# Patient Record
Sex: MALE | Race: WHITE | Employment: UNEMPLOYED | URBAN - NONMETROPOLITAN AREA
[De-identification: names, ages, dates, MRNs, and addresses within clinical notes are randomized per-mention and may not be internally consistent; named-entity substitution may affect disease eponyms.]

---

## 2020-11-30 ENCOUNTER — APPOINTMENT (OUTPATIENT)
Dept: CT IMAGING | Age: 70
DRG: 372 | End: 2020-11-30

## 2020-11-30 ENCOUNTER — HOSPITAL ENCOUNTER (INPATIENT)
Age: 70
LOS: 5 days | Discharge: HOME | DRG: 372 | End: 2020-12-05
Attending: EMERGENCY MEDICINE | Admitting: INTERNAL MEDICINE
Payer: COMMERCIAL

## 2020-11-30 PROBLEM — K65.1 INTRA-ABDOMINAL ABSCESS (HCC): Status: ACTIVE | Noted: 2020-11-30

## 2020-11-30 LAB
ALBUMIN SERPL-MCNC: 3.4 G/DL (ref 3.5–5.1)
ALP BLD-CCNC: 65 U/L (ref 38–126)
ALT SERPL-CCNC: 6 U/L (ref 11–66)
ANION GAP SERPL CALCULATED.3IONS-SCNC: 11 MEQ/L (ref 8–16)
AST SERPL-CCNC: 12 U/L (ref 5–40)
BACTERIA: ABNORMAL /HPF
BASOPHILS # BLD: 0.4 %
BASOPHILS ABSOLUTE: 0 THOU/MM3 (ref 0–0.1)
BILIRUB SERPL-MCNC: 0.4 MG/DL (ref 0.3–1.2)
BILIRUBIN URINE: NEGATIVE
BLOOD, URINE: NEGATIVE
BUN BLDV-MCNC: 10 MG/DL (ref 7–22)
CALCIUM SERPL-MCNC: 9.5 MG/DL (ref 8.5–10.5)
CASTS UA: ABNORMAL /LPF
CEA: 1 NG/ML (ref 0–5)
CHARACTER, URINE: CLEAR
CHLORIDE BLD-SCNC: 98 MEQ/L (ref 98–111)
CO2: 28 MEQ/L (ref 23–33)
COLOR: ABNORMAL
CREAT SERPL-MCNC: 0.8 MG/DL (ref 0.4–1.2)
CRYSTALS, UA: ABNORMAL
EOSINOPHIL # BLD: 0.1 %
EOSINOPHILS ABSOLUTE: 0 THOU/MM3 (ref 0–0.4)
EPITHELIAL CELLS, UA: ABNORMAL /HPF
ERYTHROCYTE [DISTWIDTH] IN BLOOD BY AUTOMATED COUNT: 15.7 % (ref 11.5–14.5)
ERYTHROCYTE [DISTWIDTH] IN BLOOD BY AUTOMATED COUNT: 49.1 FL (ref 35–45)
GFR SERPL CREATININE-BSD FRML MDRD: > 90 ML/MIN/1.73M2
GLUCOSE BLD-MCNC: 107 MG/DL (ref 70–108)
GLUCOSE URINE: NEGATIVE MG/DL
HCT VFR BLD CALC: 36 % (ref 42–52)
HEMOGLOBIN: 11.2 GM/DL (ref 14–18)
IMMATURE GRANS (ABS): 0.05 THOU/MM3 (ref 0–0.07)
IMMATURE GRANULOCYTES: 0.5 %
KETONES, URINE: ABNORMAL
LACTIC ACID: 1.3 MMOL/L (ref 0.5–2.2)
LEUKOCYTE ESTERASE, URINE: NEGATIVE
LIPASE: 39.4 U/L (ref 5.6–51.3)
LYMPHOCYTES # BLD: 9.3 %
LYMPHOCYTES ABSOLUTE: 1 THOU/MM3 (ref 1–4.8)
MCH RBC QN AUTO: 26.9 PG (ref 26–33)
MCHC RBC AUTO-ENTMCNC: 31.1 GM/DL (ref 32.2–35.5)
MCV RBC AUTO: 86.5 FL (ref 80–94)
MISCELLANEOUS 2: ABNORMAL
MONOCYTES # BLD: 8 %
MONOCYTES ABSOLUTE: 0.9 THOU/MM3 (ref 0.4–1.3)
MUCUS: ABNORMAL
NITRITE, URINE: NEGATIVE
NUCLEATED RED BLOOD CELLS: 0 /100 WBC
OSMOLALITY CALCULATION: 273.3 MOSMOL/KG (ref 275–300)
PH UA: 5.5 (ref 5–9)
PLATELET # BLD: 300 THOU/MM3 (ref 130–400)
PMV BLD AUTO: 8.6 FL (ref 9.4–12.4)
POTASSIUM REFLEX MAGNESIUM: 4.1 MEQ/L (ref 3.5–5.2)
PROTEIN UA: ABNORMAL
RBC # BLD: 4.16 MILL/MM3 (ref 4.7–6.1)
RBC URINE: ABNORMAL /HPF
RENAL EPITHELIAL, UA: ABNORMAL
SEG NEUTROPHILS: 81.7 %
SEGMENTED NEUTROPHILS ABSOLUTE COUNT: 8.9 THOU/MM3 (ref 1.8–7.7)
SODIUM BLD-SCNC: 137 MEQ/L (ref 135–145)
SPECIFIC GRAVITY, URINE: 1.02 (ref 1–1.03)
TOTAL PROTEIN: 7.2 G/DL (ref 6.1–8)
UROBILINOGEN, URINE: 0.2 EU/DL (ref 0–1)
WBC # BLD: 10.9 THOU/MM3 (ref 4.8–10.8)
WBC UA: ABNORMAL /HPF
YEAST: ABNORMAL

## 2020-11-30 PROCEDURE — 83690 ASSAY OF LIPASE: CPT

## 2020-11-30 PROCEDURE — 80053 COMPREHEN METABOLIC PANEL: CPT

## 2020-11-30 PROCEDURE — 2580000003 HC RX 258: Performed by: INTERNAL MEDICINE

## 2020-11-30 PROCEDURE — 2580000003 HC RX 258: Performed by: EMERGENCY MEDICINE

## 2020-11-30 PROCEDURE — 6370000000 HC RX 637 (ALT 250 FOR IP): Performed by: EMERGENCY MEDICINE

## 2020-11-30 PROCEDURE — 99283 EMERGENCY DEPT VISIT LOW MDM: CPT

## 2020-11-30 PROCEDURE — 1200000000 HC SEMI PRIVATE

## 2020-11-30 PROCEDURE — 36415 COLL VENOUS BLD VENIPUNCTURE: CPT

## 2020-11-30 PROCEDURE — 81001 URINALYSIS AUTO W/SCOPE: CPT

## 2020-11-30 PROCEDURE — APPSS60 APP SPLIT SHARED TIME 46-60 MINUTES: Performed by: PHYSICIAN ASSISTANT

## 2020-11-30 PROCEDURE — 87040 BLOOD CULTURE FOR BACTERIA: CPT

## 2020-11-30 PROCEDURE — 6370000000 HC RX 637 (ALT 250 FOR IP): Performed by: INTERNAL MEDICINE

## 2020-11-30 PROCEDURE — 6360000002 HC RX W HCPCS: Performed by: INTERNAL MEDICINE

## 2020-11-30 PROCEDURE — 99223 1ST HOSP IP/OBS HIGH 75: CPT | Performed by: SURGERY

## 2020-11-30 PROCEDURE — 83605 ASSAY OF LACTIC ACID: CPT

## 2020-11-30 PROCEDURE — 74176 CT ABD & PELVIS W/O CONTRAST: CPT

## 2020-11-30 PROCEDURE — 85025 COMPLETE CBC W/AUTO DIFF WBC: CPT

## 2020-11-30 PROCEDURE — 82378 CARCINOEMBRYONIC ANTIGEN: CPT

## 2020-11-30 RX ORDER — PROMETHAZINE HYDROCHLORIDE 25 MG/1
12.5 TABLET ORAL EVERY 6 HOURS PRN
Status: DISCONTINUED | OUTPATIENT
Start: 2020-11-30 | End: 2020-12-05 | Stop reason: HOSPADM

## 2020-11-30 RX ORDER — HYDROCODONE BITARTRATE AND ACETAMINOPHEN 5; 325 MG/1; MG/1
1 TABLET ORAL ONCE
Status: COMPLETED | OUTPATIENT
Start: 2020-11-30 | End: 2020-11-30

## 2020-11-30 RX ORDER — ACETAMINOPHEN 650 MG/1
650 SUPPOSITORY RECTAL EVERY 6 HOURS PRN
Status: DISCONTINUED | OUTPATIENT
Start: 2020-11-30 | End: 2020-12-05 | Stop reason: HOSPADM

## 2020-11-30 RX ORDER — SODIUM CHLORIDE 0.9 % (FLUSH) 0.9 %
10 SYRINGE (ML) INJECTION PRN
Status: DISCONTINUED | OUTPATIENT
Start: 2020-11-30 | End: 2020-12-05 | Stop reason: HOSPADM

## 2020-11-30 RX ORDER — HYDROCODONE BITARTRATE AND ACETAMINOPHEN 5; 325 MG/1; MG/1
1 TABLET ORAL EVERY 4 HOURS PRN
Status: DISCONTINUED | OUTPATIENT
Start: 2020-11-30 | End: 2020-12-05 | Stop reason: HOSPADM

## 2020-11-30 RX ORDER — SODIUM CHLORIDE 0.9 % (FLUSH) 0.9 %
10 SYRINGE (ML) INJECTION EVERY 12 HOURS SCHEDULED
Status: DISCONTINUED | OUTPATIENT
Start: 2020-11-30 | End: 2020-12-05 | Stop reason: HOSPADM

## 2020-11-30 RX ORDER — ONDANSETRON 2 MG/ML
4 INJECTION INTRAMUSCULAR; INTRAVENOUS EVERY 6 HOURS PRN
Status: DISCONTINUED | OUTPATIENT
Start: 2020-11-30 | End: 2020-12-05 | Stop reason: HOSPADM

## 2020-11-30 RX ORDER — POLYETHYLENE GLYCOL 3350 17 G/17G
17 POWDER, FOR SOLUTION ORAL DAILY PRN
Status: DISCONTINUED | OUTPATIENT
Start: 2020-11-30 | End: 2020-12-05 | Stop reason: HOSPADM

## 2020-11-30 RX ORDER — SODIUM CHLORIDE 9 MG/ML
INJECTION, SOLUTION INTRAVENOUS CONTINUOUS
Status: DISCONTINUED | OUTPATIENT
Start: 2020-11-30 | End: 2020-12-03

## 2020-11-30 RX ORDER — ACETAMINOPHEN 325 MG/1
650 TABLET ORAL EVERY 6 HOURS PRN
Status: DISCONTINUED | OUTPATIENT
Start: 2020-11-30 | End: 2020-12-05 | Stop reason: HOSPADM

## 2020-11-30 RX ORDER — 0.9 % SODIUM CHLORIDE 0.9 %
1000 INTRAVENOUS SOLUTION INTRAVENOUS ONCE
Status: COMPLETED | OUTPATIENT
Start: 2020-11-30 | End: 2020-11-30

## 2020-11-30 RX ADMIN — HYDROCODONE BITARTRATE AND ACETAMINOPHEN 1 TABLET: 5; 325 TABLET ORAL at 13:46

## 2020-11-30 RX ADMIN — SODIUM CHLORIDE: 9 INJECTION, SOLUTION INTRAVENOUS at 15:56

## 2020-11-30 RX ADMIN — SODIUM CHLORIDE 1000 ML: 9 INJECTION, SOLUTION INTRAVENOUS at 10:43

## 2020-11-30 RX ADMIN — ACETAMINOPHEN 650 MG: 325 TABLET ORAL at 23:48

## 2020-11-30 RX ADMIN — PIPERACILLIN AND TAZOBACTAM 3.38 G: 3; .375 INJECTION, POWDER, LYOPHILIZED, FOR SOLUTION INTRAVENOUS at 18:48

## 2020-11-30 ASSESSMENT — PAIN SCALES - GENERAL
PAINLEVEL_OUTOF10: 3
PAINLEVEL_OUTOF10: 10

## 2020-11-30 NOTE — CONSULTS
[address] Surgery Consultation - Lucinda Lucio  On behalf of Dr. Douglas Bearden    Pt Name: Dipak Hu  MRN: [id number]  YOB: 1950  Date of evaluation: 11/30/2020  Primary Care Physician: No primary care provider on file. Patient evaluated at the request of  Dr. Jaron Redman  Reason for evaluation: Suspected retroperitoneal abscess. IMPRESSIONS:     Active Hospital Problems    Diagnosis Date Noted    Weight loss, unintentional [R63.4] 12/01/2020    Lower abdominal pain [R10.30] 12/01/2020    Intra-abdominal abscess (HCC) [K65.1] 11/30/2020       1. 6.5 x 6.7 cm abnormal density behind the right lobe of the liver with areas of fluid and air consistent with abscess noted on CT  2. Lower abdominal pain  3. Recent unintentional weight loss  4. Intermittent anorexia  5. To kidney stones noted in the right kidney on CT  6. Leukocytosis  7. Anemia   1.  has a past medical history of Cerebral artery occlusion with cerebral infarction ([facility] [address] and Hypertension. RECOMMENDATIONS:   1. General surgery agrees with current treatment course  2. Proceed with IR drainage of peritoneal abscess with fluid analysis and IV antibiotics  3. Obtain previous surgical/medical records specifically he states he had a colonoscopy 10 years ago from [facility] in [facility] patient has undergone previous surgeries/treatments  4. Etiology of this abscess is unclear at this time could be perforated right-sided diverticulitis, or neoplastic perforation of a ascending colon lesion or some other etiology. His CEA is normal at 1  SUBJECTIVE:   History of Chief Complaint:    Lian Pham is a 65 y.o.male who presents with 2 weeks of progressively worsening lower abdominal pain. Patient states 7 to 8 months ago he had shingles over left flank, and he believes this transitioned to shingles inside his abdomen.   However the pain started 2 weeks ago and got progressively worse until he felt the pain was unbearable and he came into the emergency room for evaluation. Patient has apparently been losing weight unintentionally over the last several years, notably in the last several months. Patient also has had intermittent anorexia for several months. Patient states he has history of left hip replacement, and hardware placed in right femoral head from a fracture. Patient also has history of prior surgery at [facility] in [facility] performed by Dr. Jazlyn Eason who in 2016 performed a cholecystectomy possibly associated with gallstone pancreatitis, shortly followed by an appendectomy and umbilical hernia repair. Patient denies any history of prior bowel disease, denies diverticulosis, inflammatory bowel disease. Patient may not questionable historian? Patient also states he had a colonoscopy greater than 10 years ago showed some kind of sore in his colon which was then removed. Patient states this was not a polyp, and did not have any portion of his colon removed. Patient did state that every time a surgeon has operated on his abdomen they have remarked that it was difficult, and the operative tissue was repeatedly described in the patient's account as \"firm\". Patient states he does occasionally get dizzy, but believes this is secondary to his lack of appetite. Patient denies any nausea, vomiting, headache, constipation, diarrhea, dysuria, frequency, urgency, hematochezia, melena, hematuria. Past Medical History   has a past medical history of Cerebral artery occlusion with cerebral infarction ([address] and Hypertension. Past Surgical History   has a past surgical history that includes joint replacement; fracture surgery; hernia repair; Cholecystectomy; Appendectomy; and Colonoscopy.   Medications  Prior to Admission medications    Not on File    Scheduled Meds:   sodium chloride flush  10 mL Intravenous 2 times per day    piperacillin-tazobactam  3.375 g Intravenous Q8H     Continuous Infusions:   sodium chloride 50 mL/hr at 20 1556     PRN Meds:.sodium chloride flush, acetaminophen **OR** acetaminophen, polyethylene glycol, promethazine **OR** ondansetron, HYDROcodone-acetaminophen  Allergies  has No Known Allergies. Family History  family history is not on file. Social History   reports that he has never smoked. He has never used smokeless tobacco.  Review of Systems  General positive dizzy. Denies any fever or chills  HEENT Denies any diplopia, tinnitus or vertigo  Resp Denies any shortness of breath, cough or wheezing  Cardiac Denies any chest pain, palpitations, claudication or edema  GI Denies any melena, hematochezia, hematemesis or pyrosis   Denies any frequency, urgency, hesitancy or incontinence  Neuro Denies any focal motor or sensory deficits  SUBJECTIVE:   CURRENT VITALS:  height is 5' 8\" (1.727 m) and weight is 135 lb 9.6 oz (61.5 kg). His oral temperature is 98.4 °F (36.9 °C). His blood pressure is 130/81 and his pulse is 76. His respiration is 16 and oxygen saturation is 97%. Body mass index is 20.62 kg/m². Temperature Range (24h):Temp: 98.4 °F (36.9 °C) Temp  Av.6 °F (37 °C)  Min: 98.4 °F (36.9 °C)  Max: 98.8 °F (37.1 °C)  BP Range (84A): Systolic (74BQL), MG , Min:124 , IPU:953     Diastolic (96RUX), IXO:04, Min:75, Max:89    Pulse Range (24h): Pulse  Av  Min: 69  Max: 94  Respiration Range (24h): Resp  Av.8  Min: 16  Max: 18  Current Pulse Ox (24h):  SpO2: 97 %  Pulse Ox Range (24h):  SpO2  Av %  Min: 97 %  Max: 99 %  Oxygen Amount and Delivery:    CONSTITUTIONAL: Alert and oriented times 3, no acute distress and cooperative to examination with proper mood and affect. SKIN: Skin color, texture, turgor normal. No rashes or lesions. HEENT: Head is normocephalic, atraumatic. EOMI, PERRLA. CHEST/LUNGS: chest symmetric with normal A/P diameter, normal respiratory rate and rhythm, lungs clear to auscultation without wheezes, rales or rhonchi. No accessory muscle use.  Scars None   CARDIOVASCULAR: Heart sounds are normal.  Regular rate and rhythm without murmur, gallop or rub. Normal S1 and S2. Carotid and femoral pulses 2+/4 and equal bilaterally. ABDOMEN: More protuberant over right upper and lower quadrants. Normoactive bowel sounds. No bruits. Mildly firm to palpation over right upper quadrant with some tenderness with deep palpation. No notable evidence of hernias. NEUROLOGIC: There are no focalizing motor or sensory deficits. CN II-XII are grossly intact. Jaymie Dino EXTREMITIES: no cyanosis, no clubbing and no edema. LABS:     Recent Labs     11/30/20  1033 11/30/20  1140   WBC 10.9*  --    HGB 11.2*  --    HCT 36.0*  --      --      --    K 4.1  --    CL 98  --    CO2 28  --    BUN 10  --    CREATININE 0.8  --    CALCIUM 9.5  --    AST 12  --    ALT 6*  --    BILITOT 0.4  --    LIPASE 39.4  --    NITRU  --  NEGATIVE   COLORU  --  DK YELLOW*   BACTERIA  --  NONE SEEN     RADIOLOGY:     CT ABDOMEN PELVIS WO CONTRAST Additional Contrast? None   Final Result       1. 6.5 x 6.7 cm abnormal density behind the right lobe of the liver with areas of fluid and air consistent with inflammatory processes. 2. Malrotated right kidney with small stones. No hydronephrosis. 3. Small right pleural effusion and atelectasis or infiltrate at the right lung base. 4. Status post cholecystectomy. 5. Mild enlargement of the prostate gland. 6. Compression screw in the right femoral head and intramedullary ruth ann in the right femoral shaft. Left hip replacement in place. 7. Atherosclerotic calcification in the abdominal aorta and iliac arteries. 8. Lumbar spondylosis. 9. Otherwise negative CT scan of the abdomen and pelvis. .               **This report has been created using voice recognition software. It may contain minor errors which are inherent in voice recognition technology. **      Final report electronically signed by DR Dean Webb on 11/30/2020 11:28 AM      IR Interventional Radiology Procedure Request    (Results Pending)       Thank you for the interesting evaluation. Further recommendations to follow.     Electronically signed by MOSHE Paez on 11/30/2020 at 6:22 PM

## 2020-11-30 NOTE — ED NOTES
Patient is resting in bed with easy and unlabored respirations. Call light in reach. Side rails up x2. Wife at bedside. Patient denies further complaints or concerns. Will monitor.         Jono José RN  11/30/20 3125

## 2020-11-30 NOTE — ED PROVIDER NOTES
[address] [facility]       Chief Complaint   Patient presents with    Herpes Zoster    Fatigue       Nurses Notes reviewed and I agree except as noted in the HPI. HISTORY OF PRESENT ILLNESS    Sánchez Friedman is a 65 y.o. male who presents with complaint of history of herpes zoster. States that he has herpes inside his abdomen currently. Said that he has burning sensation to lower abdomen, left worse than right. Patient also reporting fatigue and poor oral intake. Has had no fever chills, no chest pain, no coughs, no known sick contact. No recent antibiotic use. Onset: Subacute  Duration: 1 month  Timing: Constant  Location of Pain: Lower abdomen  Intesity/severity: Moderate discomfort  Modifying Factors: none  Relieved by;  Previous Episodes; Tx Before arrival: none  REVIEW OF SYSTEMS      Review of Systems   Constitutional: Negative for fever, chills, diaphoresis and fatigue. HENT: Negative for congestion, drooling, facial swelling and sore throat. Eyes: Negative for photophobia, pain and discharge. Respiratory: Negative for cough, shortness of breath, wheezing and stridor. Cardiovascular: Negative for chest pain, palpitations and leg swelling. Gastrointestinal: Pos for lower abdominal pain; Neg for blood in stool and abdominal distention. Endocrine: Negative for cold intolerance, heat intolerance, polydipsia and polyuria. Genitourinary: Negative for dysuria, urgency, hematuria and difficulty urinating. Musculoskeletal: Negative for gait problem, neck pain and neck stiffness. Skin; No rash, No itching  Neurological: Negative for seizures, weakness and numbness. PAST MEDICAL HISTORY    has a past medical history of Cerebral artery occlusion with cerebral infarction ([address] and Hypertension. SURGICAL HISTORY      has a past surgical history that includes joint replacement; fracture surgery; hernia repair; Cholecystectomy; Appendectomy; and Colonoscopy.     CURRENT MRI are read by the radiologist.  Plain radiographic images are visualized and preliminarily interpreted by the emergency physician unless otherwise stated below.       LABS:   Labs Reviewed   CBC WITH AUTO DIFFERENTIAL - Abnormal; Notable for the following components:       Result Value    WBC 10.9 (*)     RBC 4.16 (*)     Hemoglobin 11.2 (*)     Hematocrit 36.0 (*)     MCHC 31.1 (*)     RDW-CV 15.7 (*)     RDW-SD 49.1 (*)     MPV 8.6 (*)     Segs Absolute 8.9 (*)     All other components within normal limits   COMPREHENSIVE METABOLIC PANEL W/ REFLEX TO MG FOR LOW K - Abnormal; Notable for the following components:    Alb 3.4 (*)     ALT 6 (*)     All other components within normal limits   OSMOLALITY - Abnormal; Notable for the following components:    Osmolality Calc 273.3 (*)     All other components within normal limits   URINE WITH REFLEXED MICRO - Abnormal; Notable for the following components:    Ketones, Urine TRACE (*)     Protein, UA TRACE (*)     Color, UA DK YELLOW (*)     All other components within normal limits   COMPREHENSIVE METABOLIC PANEL W/ REFLEX TO MG FOR LOW K - Abnormal; Notable for the following components:    Alb 2.7 (*)     ALT 6 (*)     All other components within normal limits   CBC WITH AUTO DIFFERENTIAL - Abnormal; Notable for the following components:    RBC 3.85 (*)     Hemoglobin 10.3 (*)     Hematocrit 34.0 (*)     MCHC 30.3 (*)     RDW-CV 15.6 (*)     RDW-SD 50.4 (*)     MPV 9.1 (*)     All other components within normal limits   CULTURE, BLOOD 1    Narrative:     Source: blood-Adult-suboptimal <5.5oz./set volume       Site: Peripheral Vein(single bottle)            Current Antibiotics: not stated   CULTURE, BLOOD 2    Narrative:     Source: blood-Adult-suboptimal <5.5oz./set volume       Site: Peripheral Vein(single bottle)            Current Antibiotics: not stated   CULTURE, ANAEROBIC AND AEROBIC    Narrative:     Source: abdomen       Site: abscess fluid          Current Antibiotics:   Piperacillin/Tazobactam   CULTURE, ANAEROBIC AND AEROBIC   CULTURE, FUNGUS   CULTURE, AEROBIC   LIPASE   ANION GAP   GLOMERULAR FILTRATION RATE, ESTIMATED   LACTIC ACID, PLASMA   LACTIC ACID, PLASMA   CEA   LACTIC ACID, PLASMA   ANION GAP   GLOMERULAR FILTRATION RATE, ESTIMATED   LACTIC ACID, PLASMA   MISCELLANEOUS LAB TEST #1    Narrative:     Zach Garcia [id number] - [id number]   LACTIC ACID, PLASMA   LACTIC ACID, PLASMA       EMERGENCY DEPARTMENT COURSE:   Vitals:    Vitals:    12/01/20 1605 12/01/20 2042 12/02/20 0045 12/02/20 0415   BP: 124/76 104/73 104/69 105/76   Pulse: 72 64 77 71   Resp: 18 16 14 16   Temp: 98.2 °F (36.8 °C) 98.6 °F (37 °C) 98.8 °F (37.1 °C) 98.4 °F (36.9 °C)   TempSrc: Oral Oral Oral Oral   SpO2: 99% 96% 97% 97%   Weight:       Height:       Patient presenting with vague abdominal complaints, thinks that he has herpes zoster infection inside his abdomen. Patient's abdomen is nonacute. CAT scan of the patient's abdomen shows fluid collection suspicious for infectious process on the right liver. Patient is nontoxic. Case discussed with the hospitalist, will admit patient to the hospital with IR consult. CRITICAL CARE:     CONSULTS:  None    PROCEDURES:  None    FINAL IMPRESSION      1. Intra-abdominal abscess ([address]          DISPOSITION/PLAN   Admitted    PATIENT REFERRED TO:  No follow-up provider specified. DISCHARGE MEDICATIONS:  There are no discharge medications for this patient.       (Please note that portions of this note were completed with a voice recognition program.  Efforts were made to edit the dictations but occasionally words are mis-transcribed.)    Joselin Brower, [address] [place] [address],   12/02/20 0800

## 2020-11-30 NOTE — H&P
INTERNAL MEDICINE SPECIALTIES    History & Physical    Patient:  Tika De Paz  YOB: 1950  Date of Service: 11/30/2020  MRN: [id number]   Acct:  [de-identified]   Primary Care Physician: No primary care provider on file. Chief Complaint: Abdominal pain    History of Present Illness:   History obtained from the patient. The patient is a 65 y.o. male who has a background history of peptic ulcer disease, hypertension, CVA had developed pain across his mid abdomen over period of 2 weeks. He describes this as burning without relief from Tylenol. Symptoms have been nonradiating and there has been no abdominal distension. He denies having any nausea vomiting diarrhea or constipation no hematemesis hematochezia or melanotic stools. He has had no fever or chills. He did have a CT scan of the abdomen performed in the emergency room which had shown  A 6.5 x 6.7 cm abdominal density behind the right lobe of the liver with areas of fluid and air consistent with an inflammatory process, malrotated right kidney with small stones, right pleural effusion which is small and atelectasis/ infiltrate of the right lung base, prior cholecystectomy and mild enlargement of the prostate gland and a compression on the right femoral head and intramedullary ruth ann on the right femoral shaft. Patient was subsequently admitted. Past Medical History:        Diagnosis Date    Cerebral artery occlusion with cerebral infarction ([address]     Hypertension        Past Surgical History:        Procedure Laterality Date    APPENDECTOMY      CHOLECYSTECTOMY      COLONOSCOPY      FRACTURE SURGERY      HERNIA REPAIR      JOINT REPLACEMENT         Home Medications:   No current facility-administered medications on file prior to encounter. No current outpatient medications on file prior to encounter. Allergies:  Patient has no known allergies. Social History:    reports that he has never smoked.  He has never used smokeless tobacco.    Family History:   No family history on file. Review of systems:    CNS: No seizures, no dizziness, no facial droop,  no paresthesia, numbness or muscle Weakness, no dysphasia or  Dysphagia,  CARDIOVASCULAR: No chest pain, dyspnea at rest or with activity, no orthopnea or PND, no palpitations, no ankle edema  RESPIRATORY: No cough wheezing , rhinorrhea, nasal congestion or sore throat  GASTROINTESTINAL SYSTEM: As per HPI or otherwise negative  GENITOURINARY SYSTEM: No dysuria , hematuria, urinary frequency , incontinence,  flank pains , urgency , genital discharge  SKIN / [place]:  No rashes, petechiae, , no open wounds , no soft tissue swelling   MUSCULOSKELETAL: No bone pains, no arthralgia, no joint swelling, no myalgia  HEMATOLOGIC: No easy bruising, no bleeding  OPHTHALMOLOGY: No conjuctival injection, no discharge, no pain, no blurring of vision, no loss of vision, no diplopia  EAR: No loss of hearing , tinnitus, ear discharge , no ear pain          Vitals:   Vitals:    11/30/20 1524   BP: 130/81   Pulse: 76   Resp: 16   Temp: 98.4 °F (36.9 °C)   SpO2: 97%      BMI: Body mass index is 20.62 kg/m². PHYSICAL EXAMINATION:            General appearance:  No apparent distress, appears stated age and cooperative. HEENT:  Normal cephalic, atraumatic without obvious deformity. Pupils equal, round, and reactive to light. Extra ocular muscles intact. Conjunctivae/corneas clear. Neck: Supple   Respiratory:  Normal respiratory effort. Clear to auscultation, bilaterally without Rales/Wheezes/Rhonchi. Cardiovascular:  Regular rhythm with normal S1/S2 without murmurs, rubs or gallops. Abdomen: Full soft,  Tender++ mid abdomen and RUQ,  normal bowel sounds. Musculoskeletal:  No clubbing, cyanosis or edema bilaterally. Full range of motion without deformity. Skin: Skin color, texture, turgor normal.  No rashes or lesions.   Neurologic: Alert and oriented X 3, Neurovascularly intact without any focal motor deficits.    Psychiatric:   Thought content appropriate, normal insight    Review of Labs and Diagnostic Testing:    Recent Results (from the past 24 hour(s))   CBC Auto Differential    Collection Time: 11/30/20 10:33 AM   Result Value Ref Range    WBC 10.9 (H) 4.8 - 10.8 thou/mm3    RBC 4.16 (L) 4.70 - 6.10 mill/mm3    Hemoglobin 11.2 (L) 14.0 - 18.0 gm/dl    Hematocrit 36.0 (L) 42.0 - 52.0 %    MCV 86.5 80.0 - 94.0 fL    MCH 26.9 26.0 - 33.0 pg    MCHC 31.1 (L) 32.2 - 35.5 gm/dl    RDW-CV 15.7 (H) 11.5 - 14.5 %    RDW-SD 49.1 (H) 35.0 - 45.0 fL    Platelets 496 320 - 926 thou/mm3    MPV 8.6 (L) 9.4 - 12.4 fL    Seg Neutrophils 81.7 %    Lymphocytes 9.3 %    Monocytes 8.0 %    Eosinophils 0.1 %    Basophils 0.4 %    Immature Granulocytes 0.5 %    Segs Absolute 8.9 (H) 1.8 - 7.7 thou/mm3    Lymphocytes Absolute 1.0 1.0 - 4.8 thou/mm3    Monocytes Absolute 0.9 0.4 - 1.3 thou/mm3    Eosinophils Absolute 0.0 0.0 - 0.4 thou/mm3    Basophils Absolute 0.0 0.0 - 0.1 thou/mm3    Immature Grans (Abs) 0.05 0.00 - 0.07 thou/mm3    nRBC 0 /100 wbc   Comprehensive Metabolic Panel w/ Reflex to MG    Collection Time: 11/30/20 10:33 AM   Result Value Ref Range    Glucose 107 70 - 108 mg/dL    CREATININE 0.8 0.4 - 1.2 mg/dL    BUN 10 7 - 22 mg/dL    Sodium 137 135 - 145 meq/L    Potassium reflex Magnesium 4.1 3.5 - 5.2 meq/L    Chloride 98 98 - 111 meq/L    CO2 28 23 - 33 meq/L    Calcium 9.5 8.5 - 10.5 mg/dL    AST 12 5 - 40 U/L    Alkaline Phosphatase 65 38 - 126 U/L    Total Protein 7.2 6.1 - 8.0 g/dL    Alb 3.4 (L) 3.5 - 5.1 g/dL    Total Bilirubin 0.4 0.3 - 1.2 mg/dL    ALT 6 (L) 11 - 66 U/L   Lipase    Collection Time: 11/30/20 10:33 AM   Result Value Ref Range    Lipase 39.4 5.6 - 51.3 U/L   Anion Gap    Collection Time: 11/30/20 10:33 AM   Result Value Ref Range    Anion Gap 11.0 8.0 - 16.0 meq/L   Glomerular Filtration Rate, Estimated    Collection Time: 11/30/20 10:33 AM   Result Value Ref Range    Est, Glom Filt Rate >90 ml/min/1.73m2   Osmolality    Collection Time: 11/30/20 10:33 AM   Result Value Ref Range    Osmolality Calc 273.3 (L) 275.0 - 300.0 mOsmol/kg   Urine with Reflexed Micro    Collection Time: 11/30/20 11:40 AM   Result Value Ref Range    Glucose, Ur NEGATIVE NEGATIVE mg/dl    Bilirubin Urine NEGATIVE NEGATIVE    Ketones, Urine TRACE (A) NEGATIVE    Specific Gravity, Urine 1.021 1.002 - 1.030    Blood, Urine NEGATIVE NEGATIVE    pH, UA 5.5 5.0 - 9.0    Protein, UA TRACE (A) NEGATIVE    Urobilinogen, Urine 0.2 0.0 - 1.0 eu/dl    Nitrite, Urine NEGATIVE NEGATIVE    Leukocyte Esterase, Urine NEGATIVE NEGATIVE    Color, UA DK YELLOW (A) STRAW-YELLOW    Character, Urine CLEAR CLEAR-SL CLOUD    RBC, UA 3-5 0-2/hpf /hpf    WBC, UA 2-4 0-4/hpf /hpf    Epithelial Cells, UA 0-2 3-5/hpf /hpf    Mucus, UA THREADS NONE SEEN/THREA    Bacteria, UA NONE SEEN FEW/NONE SEEN /hpf    Casts UA 8-15 HYALINE NONE SEEN /lpf    Crystals, UA NONE SEEN NONE SEEN    Renal Epithelial, UA NONE SEEN NONE SEEN    Yeast, UA NONE SEEN NONE SEEN    MISCELLANEOUS 2 NONE SEEN        Radiology:     Ct Abdomen Pelvis Wo Contrast Additional Contrast? None    Result Date: 11/30/2020  PROCEDURE: CT ABDOMEN PELVIS WO CONTRAST CLINICAL INFORMATION: LLQ ABD PAIN . COMPARISON: None. TECHNIQUE: Axial 5 mm CT images were obtained through the abdomen and pelvis. No contrast was given. Coronal and sagittal reconstructions were obtained. All CT scans at this facility use dose modulation, iterative reconstruction, and/or weight-based dosing when appropriate to reduce radiation dose to as low as reasonably achievable. FINDINGS: There is abnormal density in the right lower lobe posteriorly consistent with inflammatory process. The base of the heart is within appropriate limits.  This is 6.5 x 6.7 cm abnormal density behind the right lobe of liver with areas of fluid and air consistent with inflammatory process The liver is grossly normal. The spleen is normal. The adrenal glands and pancreas are grossly normal. The patient is status post cholecystectomy. 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 There is no hydronephrosis. The left kidney appears unremarkable. . No abnormalities of the small bowel loops are noted. There is atherosclerotic calcification abdominal aorta and iliac arteries bilaterally. There is no adenopathy. The urinary bladder is normal. There is an enlarged prostate gland measuring 3.5 x 3.7 cm in size. There is no pelvic free fluid. There is abnormal density adjacent to the right colon consistent with inflammatory processes. There is no adenopathy. There  is a left hip replacement in place. There is a compression screw in the right femoral head and an intramedullary ruth ann in the right femoral shaft. There is lumbar spondylosis      1. 6.5 x 6.7 cm abnormal density behind the right lobe of the liver with areas of fluid and air consistent with inflammatory processes. 2. Malrotated right kidney with small stones. No hydronephrosis. 3. Small right pleural effusion and atelectasis or infiltrate at the right lung base. 4. Status post cholecystectomy. 5. Mild enlargement of the prostate gland. 6. Compression screw in the right femoral head and intramedullary ruth ann in the right femoral shaft. Left hip replacement in place. 7. Atherosclerotic calcification in the abdominal aorta and iliac arteries. 8. Lumbar spondylosis. 9. Otherwise negative CT scan of the abdomen and pelvis. . **This report has been created using voice recognition software. It may contain minor errors which are inherent in voice recognition technology. ** Final report electronically signed by DR Jordi Santos on 11/30/2020 11:28 AM         Active Problems:    Intra-abdominal abscess ([address]  Resolved Problems:    * No resolved hospital problems.  *       Plan:  Blood cultures, IVF, start zosyn, ID/Surgical consult, consider interventional radiologist for drainage. Follow-up on CEA.   He indicates his last colonoscopy was about 13 years back will require elective colonoscopy      DVT prophylaxis: [] Lovenox                                 [x] SCDs                                 [] SQ Heparin                                 [] Encourage ambulation, low risk for DVT, no chemical or mechanical prophylaxis necessary              [] Already on Anticoagulation                Anticipated Disposition upon discharge: [x] Home                                                                         [] Home with Home Health                                                                         [] [facility]                                                                         [] [address]          Electronically signed by Nella Moore MD on 11/30/2020 at 4:22 PM

## 2020-11-30 NOTE — ED TRIAGE NOTES
Patient presents with shingles across lower abdomen. States he has had them for over a month. States he has a burning sensation and it is affecting his appetite. States he saw a doctor and they gave him some pills but they didn't help with the burning.

## 2020-11-30 NOTE — ED NOTES
Patient and family updated, patient in no distress but requesting some pain medication, denies any other needs at this time     Cr Gutierrez RN  11/30/20 5930

## 2020-12-01 ENCOUNTER — APPOINTMENT (OUTPATIENT)
Dept: CT IMAGING | Age: 70
DRG: 372 | End: 2020-12-01

## 2020-12-01 PROBLEM — R63.4 WEIGHT LOSS, UNINTENTIONAL: Status: ACTIVE | Noted: 2020-12-01

## 2020-12-01 PROBLEM — R10.30 LOWER ABDOMINAL PAIN: Status: ACTIVE | Noted: 2020-12-01

## 2020-12-01 LAB
ALBUMIN SERPL-MCNC: 2.7 G/DL (ref 3.5–5.1)
ALP BLD-CCNC: 54 U/L (ref 38–126)
ALT SERPL-CCNC: 6 U/L (ref 11–66)
ANION GAP SERPL CALCULATED.3IONS-SCNC: 11 MEQ/L (ref 8–16)
AST SERPL-CCNC: 10 U/L (ref 5–40)
BASOPHILS # BLD: 0.4 %
BASOPHILS ABSOLUTE: 0 THOU/MM3 (ref 0–0.1)
BILIRUB SERPL-MCNC: 0.3 MG/DL (ref 0.3–1.2)
BUN BLDV-MCNC: 8 MG/DL (ref 7–22)
CALCIUM SERPL-MCNC: 9.1 MG/DL (ref 8.5–10.5)
CHLORIDE BLD-SCNC: 101 MEQ/L (ref 98–111)
CO2: 24 MEQ/L (ref 23–33)
CREAT SERPL-MCNC: 0.8 MG/DL (ref 0.4–1.2)
EOSINOPHIL # BLD: 0.6 %
EOSINOPHILS ABSOLUTE: 0.1 THOU/MM3 (ref 0–0.4)
ERYTHROCYTE [DISTWIDTH] IN BLOOD BY AUTOMATED COUNT: 15.6 % (ref 11.5–14.5)
ERYTHROCYTE [DISTWIDTH] IN BLOOD BY AUTOMATED COUNT: 50.4 FL (ref 35–45)
GFR SERPL CREATININE-BSD FRML MDRD: > 90 ML/MIN/1.73M2
GLUCOSE BLD-MCNC: 95 MG/DL (ref 70–108)
HCT VFR BLD CALC: 34 % (ref 42–52)
HEMOGLOBIN: 10.3 GM/DL (ref 14–18)
IMMATURE GRANS (ABS): 0.04 THOU/MM3 (ref 0–0.07)
IMMATURE GRANULOCYTES: 0.4 %
LACTIC ACID: 0.8 MMOL/L (ref 0.5–2.2)
LACTIC ACID: 1.4 MMOL/L (ref 0.5–2.2)
LACTIC ACID: 1.6 MMOL/L (ref 0.5–2.2)
LYMPHOCYTES # BLD: 12.6 %
LYMPHOCYTES ABSOLUTE: 1.2 THOU/MM3 (ref 1–4.8)
MCH RBC QN AUTO: 26.8 PG (ref 26–33)
MCHC RBC AUTO-ENTMCNC: 30.3 GM/DL (ref 32.2–35.5)
MCV RBC AUTO: 88.3 FL (ref 80–94)
MONOCYTES # BLD: 7.6 %
MONOCYTES ABSOLUTE: 0.7 THOU/MM3 (ref 0.4–1.3)
NUCLEATED RED BLOOD CELLS: 0 /100 WBC
PLATELET # BLD: 274 THOU/MM3 (ref 130–400)
PMV BLD AUTO: 9.1 FL (ref 9.4–12.4)
POTASSIUM REFLEX MAGNESIUM: 3.8 MEQ/L (ref 3.5–5.2)
RBC # BLD: 3.85 MILL/MM3 (ref 4.7–6.1)
SEG NEUTROPHILS: 78.4 %
SEGMENTED NEUTROPHILS ABSOLUTE COUNT: 7.5 THOU/MM3 (ref 1.8–7.7)
SODIUM BLD-SCNC: 136 MEQ/L (ref 135–145)
TOTAL PROTEIN: 6.5 G/DL (ref 6.1–8)
WBC # BLD: 9.6 THOU/MM3 (ref 4.8–10.8)

## 2020-12-01 PROCEDURE — 6370000000 HC RX 637 (ALT 250 FOR IP): Performed by: INTERNAL MEDICINE

## 2020-12-01 PROCEDURE — 0W9F30Z DRAINAGE OF ABDOMINAL WALL WITH DRAINAGE DEVICE, PERCUTANEOUS APPROACH: ICD-10-PCS | Performed by: RADIOLOGY

## 2020-12-01 PROCEDURE — 6360000002 HC RX W HCPCS: Performed by: INTERNAL MEDICINE

## 2020-12-01 PROCEDURE — 1200000000 HC SEMI PRIVATE

## 2020-12-01 PROCEDURE — 97116 GAIT TRAINING THERAPY: CPT

## 2020-12-01 PROCEDURE — 97166 OT EVAL MOD COMPLEX 45 MIN: CPT

## 2020-12-01 PROCEDURE — 2580000003 HC RX 258: Performed by: INTERNAL MEDICINE

## 2020-12-01 PROCEDURE — 97163 PT EVAL HIGH COMPLEX 45 MIN: CPT

## 2020-12-01 PROCEDURE — 75989 ABSCESS DRAINAGE UNDER X-RAY: CPT

## 2020-12-01 PROCEDURE — 77012 CT SCAN FOR NEEDLE BIOPSY: CPT

## 2020-12-01 PROCEDURE — 6360000002 HC RX W HCPCS: Performed by: RADIOLOGY

## 2020-12-01 PROCEDURE — C1729 CATH, DRAINAGE: HCPCS

## 2020-12-01 PROCEDURE — 6360000002 HC RX W HCPCS

## 2020-12-01 PROCEDURE — C1769 GUIDE WIRE: HCPCS

## 2020-12-01 PROCEDURE — 97535 SELF CARE MNGMENT TRAINING: CPT

## 2020-12-01 PROCEDURE — 2709999900 HC NON-CHARGEABLE SUPPLY

## 2020-12-01 RX ORDER — FENTANYL CITRATE 50 UG/ML
50 INJECTION, SOLUTION INTRAMUSCULAR; INTRAVENOUS ONCE
Status: COMPLETED | OUTPATIENT
Start: 2020-12-01 | End: 2020-12-01

## 2020-12-01 RX ORDER — MIDAZOLAM HYDROCHLORIDE 2 MG/2ML
1 INJECTION, SOLUTION INTRAMUSCULAR; INTRAVENOUS ONCE
Status: COMPLETED | OUTPATIENT
Start: 2020-12-01 | End: 2020-12-01

## 2020-12-01 RX ADMIN — HYDROCODONE BITARTRATE AND ACETAMINOPHEN 1 TABLET: 5; 325 TABLET ORAL at 03:52

## 2020-12-01 RX ADMIN — MIDAZOLAM 1 MG: 1 INJECTION INTRAMUSCULAR; INTRAVENOUS at 09:00

## 2020-12-01 RX ADMIN — HYDROCODONE BITARTRATE AND ACETAMINOPHEN 1 TABLET: 5; 325 TABLET ORAL at 15:58

## 2020-12-01 RX ADMIN — FENTANYL CITRATE 50 MCG: 50 INJECTION, SOLUTION INTRAMUSCULAR; INTRAVENOUS at 09:00

## 2020-12-01 RX ADMIN — PIPERACILLIN AND TAZOBACTAM 3.38 G: 3; .375 INJECTION, POWDER, LYOPHILIZED, FOR SOLUTION INTRAVENOUS at 16:32

## 2020-12-01 RX ADMIN — HYDROCODONE BITARTRATE AND ACETAMINOPHEN 1 TABLET: 5; 325 TABLET ORAL at 11:10

## 2020-12-01 RX ADMIN — PIPERACILLIN AND TAZOBACTAM 3.38 G: 3; .375 INJECTION, POWDER, LYOPHILIZED, FOR SOLUTION INTRAVENOUS at 09:55

## 2020-12-01 RX ADMIN — SODIUM CHLORIDE: 9 INJECTION, SOLUTION INTRAVENOUS at 09:56

## 2020-12-01 RX ADMIN — PIPERACILLIN AND TAZOBACTAM 3.38 G: 3; .375 INJECTION, POWDER, LYOPHILIZED, FOR SOLUTION INTRAVENOUS at 00:31

## 2020-12-01 ASSESSMENT — PAIN DESCRIPTION - DESCRIPTORS
DESCRIPTORS: ACHING
DESCRIPTORS: ACHING

## 2020-12-01 ASSESSMENT — PAIN SCALES - GENERAL
PAINLEVEL_OUTOF10: 2
PAINLEVEL_OUTOF10: 0
PAINLEVEL_OUTOF10: 2
PAINLEVEL_OUTOF10: 0
PAINLEVEL_OUTOF10: 4
PAINLEVEL_OUTOF10: 3
PAINLEVEL_OUTOF10: 5
PAINLEVEL_OUTOF10: 4
PAINLEVEL_OUTOF10: 4

## 2020-12-01 ASSESSMENT — PAIN DESCRIPTION - LOCATION
LOCATION: ABDOMEN
LOCATION: ABDOMEN

## 2020-12-01 ASSESSMENT — PAIN DESCRIPTION - ORIENTATION
ORIENTATION: MID
ORIENTATION: MID;LOWER

## 2020-12-01 ASSESSMENT — PAIN DESCRIPTION - ONSET
ONSET: ON-GOING
ONSET: ON-GOING

## 2020-12-01 ASSESSMENT — PAIN DESCRIPTION - PAIN TYPE
TYPE: ACUTE PAIN
TYPE: ACUTE PAIN

## 2020-12-01 ASSESSMENT — PAIN DESCRIPTION - FREQUENCY
FREQUENCY: CONTINUOUS
FREQUENCY: CONTINUOUS

## 2020-12-01 NOTE — H&P
[address] [place] [address]  Sedation/Analgesia History & Physical    Pt Name: Taisha Carey  MRN: [id number]  YOB: 1950  Provider Performing Procedure: Melania Contreras MD  Primary Care Physician: No primary care provider on file. PRE-PROCEDURE   DNR-CCA/DNR-CC []Yes [x]No  Brief History/Pre-Procedure Diagnosis: Intra-abdominal abscess          MEDICAL HISTORY  []CAD/Valve  []Liver Disease  []Lung Disease []Diabetes  [x]Hypertension []Renal Disease  []Additional information:       has a past medical history of Cerebral artery occlusion with cerebral infarction ([facility] [address] and Hypertension. SURGICAL HISTORY   has a past surgical history that includes joint replacement; fracture surgery; hernia repair; Cholecystectomy; Appendectomy; and Colonoscopy.   Additional information:       ALLERGIES   Allergies as of 11/30/2020    (No Known Allergies)     Additional information:       MEDICATIONS   Coumadin Use Last 5 Days [x]No []Yes  Antiplatelet drug therapy use last 5 days  [x]No []Yes  Other anticoagulant use last 5 days  [x]No []Yes    Current Facility-Administered Medications:     sodium chloride flush 0.9 % injection 10 mL, 10 mL, Intravenous, 2 times per day, Jhonny Baker MD    sodium chloride flush 0.9 % injection 10 mL, 10 mL, Intravenous, PRN, Jhonny Baker MD    acetaminophen (TYLENOL) tablet 650 mg, 650 mg, Oral, Q6H PRN, 650 mg at 11/30/20 2348 **OR** acetaminophen (TYLENOL) suppository 650 mg, 650 mg, Rectal, Q6H PRN, Jhonny Baker MD    polyethylene glycol (GLYCOLAX) packet 17 g, 17 g, Oral, Daily PRN, Jhonny Baker MD    promethazine (PHENERGAN) tablet 12.5 mg, 12.5 mg, Oral, Q6H PRN **OR** ondansetron (ZOFRAN) injection 4 mg, 4 mg, Intravenous, Q6H PRN, Jhonny Baker MD    piperacillin-tazobactam (ZOSYN) 3.375 g in dextrose 5 % 50 mL IVPB extended infusion (mini-bag), 3.375 g, Intravenous, Q8H, Jhonny Baker MD, Stopped at 12/01/20 7687   0.9 % sodium chloride infusion, , Intravenous, Continuous, Desirae Benson MD, Last Rate: 50 mL/hr at 11/30/20 1556    HYDROcodone-acetaminophen (NORCO) 5-325 MG per tablet 1 tablet, 1 tablet, Oral, Q4H PRN, Desirae Benson MD, 1 tablet at 12/01/20 0352  Prior to Admission medications    Not on File     Additional information:       VITAL SIGNS   Vitals:    12/01/20 0910   BP: (!) 101/45   Pulse: 69   Resp: 24   Temp:    SpO2: 97%       PHYSICAL:   Heart:  [x]Regular rate and rhythm  []Other:    Lungs:  [x]Clear    []Other:    Abdomen: [x]Soft    []Other:    Mental Status: [x]Alert & Oriented  []Other:      PLANNED PROCEDURE   []Biospy []Arteriogram              [x]Drainage   []Mediport Insertion  []Fistulogram []IV access       []Vertebroplasty / Augmentation  []IVC filter []Dialysis catheter []Biliary drainage  []Other: []CAPD Catheter []Nephrostomy Tube / Stent  SEDATION  Planned agent:[x]Midazolam []Meperidine [x]Sublimaze []Dilaudid []Morphine     []Diazepam  []Other:     ASA Classification:  []1 [x]2 []3 []4 []5  Class 1: A normal healthy patient  Class 2: Pt with mild to moderate systemic disease  Class 3: Severe systemic disease or disturbance  Class 4: Severe systemic disorders that are already life threatening. Class 5: Moribund pt with little chances of survival, for more than 24 hours. Mallampati I Airway Classification:   []1 [x]2 []3 []4    [x]Pre-procedure diagnostic studies complete and results available. Comment:    [x]Previous sedation/anesthesia experiences assessed. Comment:    [x]The patient is an appropriate candidate to undergo the planned procedure sedation and anesthesia. (Refer to nursing sedation/analgesia documentation record)  [x]Formulation and discussion of sedation/procedure plan, risks, and expectations with patient and/or responsible adult completed. [x]Patient examined immediately prior to the procedure.  (Refer to nursing sedation/analgesia documentation record)    Vincent Lott MD  Electronically signed 12/1/2020 at 9:14 AM

## 2020-12-01 NOTE — PROGRESS NOTES
INTERNAL MEDICINE SPECIALTIES  Progress Note For Dr Karen Lara       Patient:  Kirstin Rosa  YOB: 1950  Date of Service: 12/1/2020  MRN: [id number]   Acct:  [de-identified]   Primary Care Physician: No primary care provider on file. SUBJECTIVE: Had truclose drain placed in right flank    Home Medications:   No current facility-administered medications on file prior to encounter. No current outpatient medications on file prior to encounter. Scheduled Meds:   sodium chloride flush  10 mL Intravenous 2 times per day    piperacillin-tazobactam  3.375 g Intravenous Q8H     Continuous Infusions:   sodium chloride 50 mL/hr at 11/30/20 1556     PRN Meds:sodium chloride flush, acetaminophen **OR** acetaminophen, polyethylene glycol, promethazine **OR** ondansetron, HYDROcodone-acetaminophen        Allergies:  Patient has no known allergies. OBJECTIVE:    Vitals:   Vitals:    12/01/20 0330   BP: 109/66   Pulse: 77   Resp: 16   Temp: 98.5 °F (36.9 °C)   SpO2: 96%      BMI: Body mass index is 20.62 kg/m². PHYSICAL EXAMINATION:               General appearance:  No apparent distress, appears stated age and cooperative. HEENT:  Normal cephalic, atraumatic without obvious deformity. Pupils equal, round, and reactive to light. Extra ocular muscles intact. Conjunctivae/corneas clear. Neck: Supple   Respiratory:  Normal respiratory effort. Clear to auscultation, bilaterally without Rales/Wheezes/Rhonchi. Cardiovascular:  Regular rhythm with normal S1/S2 without murmurs, rubs or gallops. Abdomen: Full , truclose drain in place right flank, soft,  Tender++ mid abdomen and RUQ,  normal bowel sounds. Musculoskeletal:  No clubbing, cyanosis or edema bilaterally. Full range of motion without deformity. Skin: Skin color, texture, turgor normal.  No rashes or lesions. Neurologic: Alert and oriented X 3, Neurovascularly intact without any focal motor deficits.    Psychiatric:   Thought content appropriate, normal insight    Review of Labs and Diagnostic Testing:    Recent Results (from the past 24 hour(s))   CBC Auto Differential    Collection Time: 11/30/20 10:33 AM   Result Value Ref Range    WBC 10.9 (H) 4.8 - 10.8 thou/mm3    RBC 4.16 (L) 4.70 - 6.10 mill/mm3    Hemoglobin 11.2 (L) 14.0 - 18.0 gm/dl    Hematocrit 36.0 (L) 42.0 - 52.0 %    MCV 86.5 80.0 - 94.0 fL    MCH 26.9 26.0 - 33.0 pg    MCHC 31.1 (L) 32.2 - 35.5 gm/dl    RDW-CV 15.7 (H) 11.5 - 14.5 %    RDW-SD 49.1 (H) 35.0 - 45.0 fL    Platelets 079 375 - 802 thou/mm3    MPV 8.6 (L) 9.4 - 12.4 fL    Seg Neutrophils 81.7 %    Lymphocytes 9.3 %    Monocytes 8.0 %    Eosinophils 0.1 %    Basophils 0.4 %    Immature Granulocytes 0.5 %    Segs Absolute 8.9 (H) 1.8 - 7.7 thou/mm3    Lymphocytes Absolute 1.0 1.0 - 4.8 thou/mm3    Monocytes Absolute 0.9 0.4 - 1.3 thou/mm3    Eosinophils Absolute 0.0 0.0 - 0.4 thou/mm3    Basophils Absolute 0.0 0.0 - 0.1 thou/mm3    Immature Grans (Abs) 0.05 0.00 - 0.07 thou/mm3    nRBC 0 /100 wbc   Comprehensive Metabolic Panel w/ Reflex to MG    Collection Time: 11/30/20 10:33 AM   Result Value Ref Range    Glucose 107 70 - 108 mg/dL    CREATININE 0.8 0.4 - 1.2 mg/dL    BUN 10 7 - 22 mg/dL    Sodium 137 135 - 145 meq/L    Potassium reflex Magnesium 4.1 3.5 - 5.2 meq/L    Chloride 98 98 - 111 meq/L    CO2 28 23 - 33 meq/L    Calcium 9.5 8.5 - 10.5 mg/dL    AST 12 5 - 40 U/L    Alkaline Phosphatase 65 38 - 126 U/L    Total Protein 7.2 6.1 - 8.0 g/dL    Alb 3.4 (L) 3.5 - 5.1 g/dL    Total Bilirubin 0.4 0.3 - 1.2 mg/dL    ALT 6 (L) 11 - 66 U/L   Lipase    Collection Time: 11/30/20 10:33 AM   Result Value Ref Range    Lipase 39.4 5.6 - 51.3 U/L   Anion Gap    Collection Time: 11/30/20 10:33 AM   Result Value Ref Range    Anion Gap 11.0 8.0 - 16.0 meq/L   Glomerular Filtration Rate, Estimated    Collection Time: 11/30/20 10:33 AM   Result Value Ref Range    Est, Glom Filt Rate >90 ml/min/1.73m2   Osmolality    Collection Time: 11/30/20 10:33 AM   Result Value Ref Range    Osmolality Calc 273.3 (L) 275.0 - 300.0 mOsmol/kg   Urine with Reflexed Micro    Collection Time: 11/30/20 11:40 AM   Result Value Ref Range    Glucose, Ur NEGATIVE NEGATIVE mg/dl    Bilirubin Urine NEGATIVE NEGATIVE    Ketones, Urine TRACE (A) NEGATIVE    Specific Gravity, Urine 1.021 1.002 - 1.030    Blood, Urine NEGATIVE NEGATIVE    pH, UA 5.5 5.0 - 9.0    Protein, UA TRACE (A) NEGATIVE    Urobilinogen, Urine 0.2 0.0 - 1.0 eu/dl    Nitrite, Urine NEGATIVE NEGATIVE    Leukocyte Esterase, Urine NEGATIVE NEGATIVE    Color, UA DK YELLOW (A) STRAW-YELLOW    Character, Urine CLEAR CLEAR-SL CLOUD    RBC, UA 3-5 0-2/hpf /hpf    WBC, UA 2-4 0-4/hpf /hpf    Epithelial Cells, UA 0-2 3-5/hpf /hpf    Mucus, UA THREADS NONE SEEN/THREA    Bacteria, UA NONE SEEN FEW/NONE SEEN /hpf    Casts UA 8-15 HYALINE NONE SEEN /lpf    Crystals, UA NONE SEEN NONE SEEN    Renal Epithelial, UA NONE SEEN NONE SEEN    Yeast, UA NONE SEEN NONE SEEN    MISCELLANEOUS 2 NONE SEEN    Culture, Blood 1    Collection Time: 11/30/20  3:57 PM    Specimen: Blood   Result Value Ref Range    Blood Culture, Routine No growth-preliminary     CEA    Collection Time: 11/30/20  3:57 PM   Result Value Ref Range    CEA 1.0 0.0 - 5.0 ng/ml   Lactic acid, plasma    Collection Time: 11/30/20  9:10 PM   Result Value Ref Range    Lactic Acid 1.3 0.5 - 2.2 mmol/L   Lactic acid, plasma    Collection Time: 12/01/20  2:22 AM   Result Value Ref Range    Lactic Acid 0.8 0.5 - 2.2 mmol/L   Comprehensive Metabolic Panel w/ Reflex to MG    Collection Time: 12/01/20  2:22 AM   Result Value Ref Range    Glucose 95 70 - 108 mg/dL    CREATININE 0.8 0.4 - 1.2 mg/dL    BUN 8 7 - 22 mg/dL    Sodium 136 135 - 145 meq/L    Potassium reflex Magnesium 3.8 3.5 - 5.2 meq/L    Chloride 101 98 - 111 meq/L    CO2 24 23 - 33 meq/L    Calcium 9.1 8.5 - 10.5 mg/dL    AST 10 5 - 40 U/L    Alkaline Phosphatase 54 38 - 126 U/L    Total Protein 6.5 6.1 - 8.0 g/dL    Alb 2.7 (L) 3.5 - 5.1 g/dL    Total Bilirubin 0.3 0.3 - 1.2 mg/dL    ALT 6 (L) 11 - 66 U/L   CBC auto differential    Collection Time: 12/01/20  2:22 AM   Result Value Ref Range    WBC 9.6 4.8 - 10.8 thou/mm3    RBC 3.85 (L) 4.70 - 6.10 mill/mm3    Hemoglobin 10.3 (L) 14.0 - 18.0 gm/dl    Hematocrit 34.0 (L) 42.0 - 52.0 %    MCV 88.3 80.0 - 94.0 fL    MCH 26.8 26.0 - 33.0 pg    MCHC 30.3 (L) 32.2 - 35.5 gm/dl    RDW-CV 15.6 (H) 11.5 - 14.5 %    RDW-SD 50.4 (H) 35.0 - 45.0 fL    Platelets 350 233 - 388 thou/mm3    MPV 9.1 (L) 9.4 - 12.4 fL    Seg Neutrophils 78.4 %    Lymphocytes 12.6 %    Monocytes 7.6 %    Eosinophils 0.6 %    Basophils 0.4 %    Immature Granulocytes 0.4 %    Segs Absolute 7.5 1.8 - 7.7 thou/mm3    Lymphocytes Absolute 1.2 1.0 - 4.8 thou/mm3    Monocytes Absolute 0.7 0.4 - 1.3 thou/mm3    Eosinophils Absolute 0.1 0.0 - 0.4 thou/mm3    Basophils Absolute 0.0 0.0 - 0.1 thou/mm3    Immature Grans (Abs) 0.04 0.00 - 0.07 thou/mm3    nRBC 0 /100 wbc   Anion Gap    Collection Time: 12/01/20  2:22 AM   Result Value Ref Range    Anion Gap 11.0 8.0 - 16.0 meq/L   Glomerular Filtration Rate, Estimated    Collection Time: 12/01/20  2:22 AM   Result Value Ref Range    Est, Glom Filt Rate >90 ml/min/1.73m2       Radiology:     Ct Abdomen Pelvis Wo Contrast Additional Contrast? None    Result Date: 11/30/2020  PROCEDURE: CT ABDOMEN PELVIS WO CONTRAST CLINICAL INFORMATION: LLQ ABD PAIN . COMPARISON: None. TECHNIQUE: Axial 5 mm CT images were obtained through the abdomen and pelvis. No contrast was given. Coronal and sagittal reconstructions were obtained. All CT scans at this facility use dose modulation, iterative reconstruction, and/or weight-based dosing when appropriate to reduce radiation dose to as low as reasonably achievable. FINDINGS: There is abnormal density in the right lower lobe posteriorly consistent with inflammatory process. The base of the heart is within appropriate limits. This is 6.5 x 6.7 cm abnormal density behind the right lobe of liver with areas of fluid and air consistent with inflammatory process The liver is grossly normal. The spleen is normal. The adrenal glands and pancreas are grossly normal. The patient is status post cholecystectomy. 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 There is no hydronephrosis. The left kidney appears unremarkable. . No abnormalities of the small bowel loops are noted. There is atherosclerotic calcification abdominal aorta and iliac arteries bilaterally. There is no adenopathy. The urinary bladder is normal. There is an enlarged prostate gland measuring 3.5 x 3.7 cm in size. There is no pelvic free fluid. There is abnormal density adjacent to the right colon consistent with inflammatory processes. There is no adenopathy. There  is a left hip replacement in place. There is a compression screw in the right femoral head and an intramedullary ruth ann in the right femoral shaft. There is lumbar spondylosis      1. 6.5 x 6.7 cm abnormal density behind the right lobe of the liver with areas of fluid and air consistent with inflammatory processes. 2. Malrotated right kidney with small stones. No hydronephrosis. 3. Small right pleural effusion and atelectasis or infiltrate at the right lung base. 4. Status post cholecystectomy. 5. Mild enlargement of the prostate gland. 6. Compression screw in the right femoral head and intramedullary ruth ann in the right femoral shaft. Left hip replacement in place. 7. Atherosclerotic calcification in the abdominal aorta and iliac arteries. 8. Lumbar spondylosis. 9. Otherwise negative CT scan of the abdomen and pelvis. . **This report has been created using voice recognition software. It may contain minor errors which are inherent in voice recognition technology. ** Final report electronically signed by DR Jordi Santos on 11/30/2020 11:28 AM        ASSESMENT:      Active Problems: Intra-abdominal abscess ([address]  Resolved Problems:    * No resolved hospital problems. *  Unintentional weight loss  Anorexia    PLAN:  Follow-up  Blood cultures, IVF,  continue zosyn, appreciate ID/Surgical consult,  S/p placement of truclose drain by interventional radiologistf/u culture. Continue pain management.   He indicates his last colonoscopy was about 13 years back will require elective colonoscopy once infection resolves         DVT prophylaxis: [] Lovenox                                 [x] SCDs                                 [] SQ Heparin                                 [] Encourage ambulation, low risk for DVT, no chemical or mechanical prophylaxis necessary              [] Already on Anticoagulation                Anticipated Disposition upon discharge: [] Home                                                                         [] Home with Home Health                                                                         [] [facility]                                                                         [] [address]          Electronically signed by Katiuska Johansen MD on 12/1/2020 at 6:00 AM

## 2020-12-01 NOTE — PROGRESS NOTES
Hepatic:   Recent Labs     11/30/20  1033 12/01/20  0222   ALKPHOS 65 54   ALT 6* 6*   AST 12 10   PROT 7.2 6.5   BILITOT 0.4 0.3   LABALBU 3.4* 2.7*     Amylase and Lipase:  Recent Labs     12/01/20 0222   LACTA 0.8     Lactic Acid:   Recent Labs     12/01/20 0222   LACTA 0.8        CULTURES:   UA:   Recent Labs     11/30/20  1140   PHUR 5.5   COLORU DK YELLOW*   MUCUS THREADS   PROTEINU TRACE*   BLOODU NEGATIVE   RBCUA 3-5   WBCUA 2-4   BACTERIA NONE SEEN   NITRU NEGATIVE   GLUCOSEU NEGATIVE   BILIRUBINUR NEGATIVE   UROBILINOGEN 0.2   KETUA TRACE*     Micro:   Lab Results   Component Value Date    BC No growth-preliminary  11/30/2020          Problem list of patient:     Patient Active Problem List   Diagnosis Code    Intra-abdominal abscess ([facility] [address] K65.1    Weight loss, unintentional R63.4    Lower abdominal pain R10.30         ASSESSMENT/PLAN   intrabadominal abscess: a drainage tube was placed. Will follow cx report  Continue iv Zosyn.       Juan Antonio Cortés MD, Nayeli Conquest 12/1/2020 12:08 PM

## 2020-12-01 NOTE — OP NOTE
Department of Radiology  Post Procedure Progress Note      Pre-Procedure Diagnosis:  Intra-abdominal abscess     Procedure Performed:  CT guided drain placement    Anesthesia: local / versed and fentanyl    Findings: successful    Immediate Complications:  None    Estimated Blood Loss: minimal    SEE DICTATED PROCEDURE NOTE FOR COMPLETE DETAILS.     Electronically signed by Dejan Valle MD on 12/1/2020 at 9:15 AM

## 2020-12-01 NOTE — H&P
Formulation and discussion of sedation / procedure plans, risks, benefits, side effects and alternatives with patient and/or responsible adult completed.     Electronically signed by Fidelia Parson MD on 12/1/2020 at 9:14 AM

## 2020-12-01 NOTE — PROGRESS NOTES
[address] [place] [address]  INPATIENT PHYSICAL THERAPY  EVALUATION  [facility] - 6E-66/066-A    Time In: 8711  Time Out: 1425  Timed Code Treatment Minutes: 8 Minutes  Minutes: 23          Date: 2020  Patient Name: Sherron Jordan,  Gender:  male        MRN: [id number]  : 1950  (71 y.o.)      Referring Practitioner: Dr. Kathy Downey  Diagnosis: intraabdominal abscess  Additional Pertinent Hx: Per MD note on 2020:70 y.o. male who has a background history of peptic ulcer disease, hypertension, CVA had developed pain across his mid abdomen over period of 2 weeks. He describes this as burning without relief from Tylenol. Symptoms have been nonradiating and there has been no abdominal distension. He denies having any nausea vomiting diarrhea or constipation no hematemesis hematochezia or melanotic stools. He has had no fever or chills.   He did have a CT scan of the abdomen performed in the emergency room which had shown  A 6.5 x 6.7 cm abdominal density behind the right lobe of the liver with areas of fluid and air consistent with an inflammatory process, malrotated right kidney with small stones, right pleural effusion which is small and atelectasis/ infiltrate of the right lung base, prior cholecystectomy and mild enlargement of the prostate gland and a compression on the right femoral head and intramedullary ruth ann on the right femoral shaft. s/p CT guided drain placement on 20 for intraabdominal abscess     Restrictions/Precautions:  Restrictions/Precautions: Fall Risk  Position Activity Restriction  Other position/activity restrictions: hx of shingles & CVA, abdominal drain placed     Subjective:  Chart Reviewed: Yes  Patient assessed for rehabilitation services?: Yes  Subjective: pleasant and cooperative, wife present and supportive    General:            Hearing: Within functional limits         Pain: no pain per pt    Social/Functional History:    Lives With: Spouse  Type of Home: House  Home Layout: One level  Home Access: [address]: 4 wheeled walker, Rolling walker, Wheelchair-manual     Bathroom Shower/Tub: (sponge bathe)  Bathroom Toilet: Handicap height  Bathroom Equipment: Grab bars around toilet       ADL Assistance: Needs assistance(A for BADL & shoes)  Homemaking Assistance: Needs assistance  Ambulation Assistance: Independent  Transfer Assistance: Independent          Additional Comments: Pt reports using 4 wheeled walker at home. w/c for longer distance. OBJECTIVE:  Range of Motion:  Bilateral Lower Extremity: WFL    Strength:  Bilateral Lower Extremity: WFL    Balance:  Static Sitting Balance:  Supervision  Static Standing Balance: Stand By Assistance, with RW    Bed Mobility:  Rolling to Right: Supervision   Supine to Sit: Stand By Assistance  Sit to Supine: Stand By Assistance   Scooting: Stand By Assistance  HOB up 20 degrees and use BR  Transfers:  Sit to Stand: Stand By Assistance  Stand to Sit:Stand By Assistance    Ambulation:  Stand By Assistance  Distance: 10'x1, 90'x1  Surface: Level Tile  Device:Rolling Walker  Gait Deviations: Forward Flexed Posture, Slow Lauren and Mild Path Deviations        Functional Outcome Measures: Completed  AM-PAC Inpatient Mobility Raw Score : 18  AM-PAC Inpatient T-Scale Score : 43.63    ASSESSMENT:  Activity Tolerance:  Patient tolerance of  treatment: fair. Treatment Initiated: Treatment and education initiated within context of evaluation. Evaluation time included review of current medical information, gathering information related to past medical, social and functional history, completion of standardized testing, formal and informal observation of tasks, assessment of data and development of plan of care and goals.   Treatment time included skilled education and facilitation of tasks to increase safety and independence with functional mobility for improved independence and quality of life.    Assessment: Body structures, Functions, Activity limitations: Decreased functional mobility , Decreased balance, Decreased endurance, Decreased strength  Assessment: pt with abdominal drain, generalized weakness, deconditioning, dec balance, use of walker and inc assist for safe mobility, recommend cont PT to inc pt I with functional mobility  Prognosis: Good    REQUIRES PT FOLLOW UP: Yes    Discharge Recommendations:  Discharge Recommendations: Continue to assess pending progress, Patient would benefit from continued therapy after discharge, Home with assist PRN    Patient Education:  PT Education: Goals, PT Role, Plan of Care, Functional Mobility Training    Equipment Recommendations: Other: cont to monitor for needs    Plan:  Times per week: 3-5X GM  Times per day: Daily  Specific instructions for Next Treatment: therex and mobility    Goals:  Patient goals : plans return home  Short term goals  Time Frame for Short term goals: by discharge  Short term goal 1: bed mobility with MOD I to get in/out of bed  Short term goal 2: transfer with MOD I to get in/out of chairs  Short term goal 3: amb 150'x1 with RW and S to walk safely in home  Long term goals  Time Frame for Long term goals : no LTGs set secondary to short ELOS    Following session, patient left in safe position with all fall risk precautions in place.

## 2020-12-01 NOTE — PROGRESS NOTES
[address] [facility]  INPATIENT OCCUPATIONAL THERAPY  [facility] 6E  EVALUATION    Time:    Time In: 6364  Time Out: 8327  Timed Code Treatment Minutes: 11 Minutes  Minutes: 26          Date: 2020  Patient Name: Nicol Maldonado,   Gender: male      MRN: [id number]  : 1950  (71 y.o.)  Referring Practitioner: Dr. Sotero Villalpando  Diagnosis: intra-abdominal abscess  Additional Pertinent Hx: Per MD note on 2020:70 y.o. male who has a background history of peptic ulcer disease, hypertension, CVA had developed pain across his mid abdomen over period of 2 weeks. He describes this as burning without relief from Tylenol. Symptoms have been nonradiating and there has been no abdominal distension. He denies having any nausea vomiting diarrhea or constipation no hematemesis hematochezia or melanotic stools. He has had no fever or chills. He did have a CT scan of the abdomen performed in the emergency room which had shown  A 6.5 x 6.7 cm abdominal density behind the right lobe of the liver with areas of fluid and air consistent with an inflammatory process, malrotated right kidney with small stones, right pleural effusion which is small and atelectasis/ infiltrate of the right lung base, prior cholecystectomy and mild enlargement of the prostate gland and a compression on the right femoral head and intramedullary ruth ann on the right femoral shaft.     Restrictions/Precautions:  Restrictions/Precautions: Fall Risk    Subjective  Chart Reviewed: Yes, Orders, Progress Notes, History and Physical  Patient assessed for rehabilitation services?: Yes  Family / Caregiver Present: Yes(wife)    Subjective: cooperative, pleasant    Pain:  Pain Assessment  Patient Currently in Pain: No    Social/Functional History:  Lives With: Spouse  Type of Home: House  Home Layout: One level  Home Access: Ramped entrance  Home Equipment: 4 wheeled walker, Rolling walker, Wheelchair-manual   Bathroom Shower/Tub: (sponge bathe)  Bathroom Toilet: Handicap height  Bathroom Equipment: Grab bars around toilet       ADL Assistance: Needs assistance(A for BADL & shoes)  Homemaking Assistance: Needs assistance  Ambulation Assistance: Independent  Transfer Assistance: Independent          Additional Comments: Pt reports using 4 wheeled walker at home. w/c for longer distance. Cognition/Orientation:     Overall Cognitive Status: Exceptions(decreased STM)    ADL's:  LE Dressing: Dependent/Total(for adjusting slipper socks; wife A at home)  Toileting: Stand by assistance(stood at toilet to urinate)       Functional Mobility:  Bed mobility  Supine to Sit: Stand by assistance  Sit to Supine: Stand by assistance  Comment: HOB elevated & using bedrail    Functional Mobility  Functional - Mobility Device: 4-Wheeled Walker  Activity: To/from bathroom  Assist Level: Stand by assistance  Functional Mobility Comments: Pt stepped away from walker at entrance to bathroom-reports this is what he does at home     Balance:  Balance  Sitting Balance: Independent  Standing Balance: Stand by assistance    Transfers:  Sit to stand: Stand by assistance  Stand to sit: Stand by assistance       Upper Extremity Assessment:   LUE AROM : WFL  RUE AROM : Exceptions(shoulder flexion to 60, distal WFL)    LUE Strength  Gross LUE Strength: WFL  RUE Strength  Gross RUE Strength: Exceptions to WFL(R shoulder flexion 3-/5, extension 4-/5, elbow 4/5)    Sensation  Overall Sensation Status: WFL       Activity Tolerance: Patient Tolerated treatment well       Assessment:  Assessment: Pt demo mildly decreased ADL & functional mobility status over PLOF. Continued OT recommended to educate Pt on safety & adaptative strategies for increased safety upon returning home.   Performance deficits / Impairments: Decreased functional mobility , Decreased ADL status, Decreased safe awareness  Prognosis: Fair  REQUIRES OT FOLLOW UP: Yes  Decision Making: Medium Complexity  Safety Devices in place: Yes  Type of devices: All fall risk precautions in place, Call light within reach    Treatment Initiated: Treatment and education initiated within context of evaluation. Evaluation time included review of current medical information, gathering information related to past medical, social and functional history, completion of standardized testing, formal and informal observation of tasks, assessment of data and development of plan of care and goals. Treatment time included skilled education and facilitation of tasks to increase safety and independence with ADL's for improved functional independence and quality of life. Discharge Recommendations:  Home with assist PRN    Patient Education:  OT Education: OT Role, Plan of Care, ADL Adaptive Strategies  Barriers to Learning: none    Equipment Recommendations: Other: Has needed AE at home. Plan:  Times per week: 3-5x  Current Treatment Recommendations: Balance Training, Functional Mobility Training, Self-Care / ADL, Safety Education & Training    Goals:  Patient goals : go home  Short term goals  Time Frame for Short term goals: until discharge  Short term goal 1: Complete 3-4 min standing ADL task with S for increased ease of sinkside grooming  Short term goal 2: Complete mobility to/from bathroom + into hallway with 4 wheeled walker, S, & 0-2 vcs for safety  Short term goal 3: Complete various t/fs including toilet with mod I & 0-1 vcs for safety  Long term goals  Time Frame for Long term goals : No LTG set d/t short ELOS  See long-term goal time frame for expected duration of plan of care. If no long-term goals established, a short length of stay is anticipated. Following session, patient left in safe position with all fall risk precautions in place.

## 2020-12-02 PROBLEM — E44.0 MODERATE MALNUTRITION (HCC): Status: ACTIVE | Noted: 2020-12-02

## 2020-12-02 LAB — LACTIC ACID: 1.2 MMOL/L (ref 0.5–2.2)

## 2020-12-02 PROCEDURE — 36415 COLL VENOUS BLD VENIPUNCTURE: CPT

## 2020-12-02 PROCEDURE — 2580000003 HC RX 258: Performed by: INTERNAL MEDICINE

## 2020-12-02 PROCEDURE — 99232 SBSQ HOSP IP/OBS MODERATE 35: CPT | Performed by: SURGERY

## 2020-12-02 PROCEDURE — 1200000000 HC SEMI PRIVATE

## 2020-12-02 PROCEDURE — 6360000002 HC RX W HCPCS: Performed by: INTERNAL MEDICINE

## 2020-12-02 PROCEDURE — 6370000000 HC RX 637 (ALT 250 FOR IP): Performed by: INTERNAL MEDICINE

## 2020-12-02 PROCEDURE — 83605 ASSAY OF LACTIC ACID: CPT

## 2020-12-02 PROCEDURE — 97110 THERAPEUTIC EXERCISES: CPT

## 2020-12-02 RX ADMIN — PIPERACILLIN AND TAZOBACTAM 3.38 G: 3; .375 INJECTION, POWDER, LYOPHILIZED, FOR SOLUTION INTRAVENOUS at 09:17

## 2020-12-02 RX ADMIN — PIPERACILLIN AND TAZOBACTAM 3.38 G: 3; .375 INJECTION, POWDER, LYOPHILIZED, FOR SOLUTION INTRAVENOUS at 16:54

## 2020-12-02 RX ADMIN — ACETAMINOPHEN 650 MG: 325 TABLET ORAL at 13:30

## 2020-12-02 RX ADMIN — SODIUM CHLORIDE: 9 INJECTION, SOLUTION INTRAVENOUS at 23:08

## 2020-12-02 RX ADMIN — PIPERACILLIN AND TAZOBACTAM 3.38 G: 3; .375 INJECTION, POWDER, LYOPHILIZED, FOR SOLUTION INTRAVENOUS at 00:35

## 2020-12-02 RX ADMIN — HYDROCODONE BITARTRATE AND ACETAMINOPHEN 1 TABLET: 5; 325 TABLET ORAL at 07:25

## 2020-12-02 RX ADMIN — HYDROCODONE BITARTRATE AND ACETAMINOPHEN 1 TABLET: 5; 325 TABLET ORAL at 00:37

## 2020-12-02 RX ADMIN — SODIUM CHLORIDE: 9 INJECTION, SOLUTION INTRAVENOUS at 04:57

## 2020-12-02 RX ADMIN — ACETAMINOPHEN 650 MG: 325 TABLET ORAL at 19:35

## 2020-12-02 ASSESSMENT — PAIN - FUNCTIONAL ASSESSMENT
PAIN_FUNCTIONAL_ASSESSMENT: ACTIVITIES ARE NOT PREVENTED
PAIN_FUNCTIONAL_ASSESSMENT: ACTIVITIES ARE NOT PREVENTED

## 2020-12-02 ASSESSMENT — PAIN DESCRIPTION - PAIN TYPE
TYPE: ACUTE PAIN
TYPE: CHRONIC PAIN
TYPE: CHRONIC PAIN
TYPE: ACUTE PAIN

## 2020-12-02 ASSESSMENT — PAIN DESCRIPTION - DESCRIPTORS
DESCRIPTORS: NAGGING
DESCRIPTORS: ACHING
DESCRIPTORS: ACHING
DESCRIPTORS: BURNING;OTHER (COMMENT)
DESCRIPTORS: BURNING;SORE

## 2020-12-02 ASSESSMENT — PAIN DESCRIPTION - FREQUENCY
FREQUENCY: INTERMITTENT
FREQUENCY: INTERMITTENT
FREQUENCY: CONTINUOUS

## 2020-12-02 ASSESSMENT — PAIN DESCRIPTION - PROGRESSION
CLINICAL_PROGRESSION: NOT CHANGED
CLINICAL_PROGRESSION: GRADUALLY WORSENING

## 2020-12-02 ASSESSMENT — PAIN DESCRIPTION - ORIENTATION
ORIENTATION: MID;LOWER
ORIENTATION: MID
ORIENTATION: MID;LEFT;RIGHT

## 2020-12-02 ASSESSMENT — PAIN SCALES - GENERAL
PAINLEVEL_OUTOF10: 4
PAINLEVEL_OUTOF10: 4
PAINLEVEL_OUTOF10: 0
PAINLEVEL_OUTOF10: 5
PAINLEVEL_OUTOF10: 3
PAINLEVEL_OUTOF10: 4
PAINLEVEL_OUTOF10: 3
PAINLEVEL_OUTOF10: 5

## 2020-12-02 ASSESSMENT — PAIN DESCRIPTION - ONSET
ONSET: ON-GOING

## 2020-12-02 ASSESSMENT — PAIN DESCRIPTION - LOCATION
LOCATION: ABDOMEN

## 2020-12-02 NOTE — PROGRESS NOTES
Yusef Sarah M.D. [facility]  Daily Progress Note    Pt Name: Nicol Madlonado  Medical Record Number: [id number]  Date of Birth 1950   Today's Date: 12/2/2020    ASSESSMENT:     1. HD # 2  Active Hospital Problems    Diagnosis Date Noted    Weight loss, unintentional [R63.4] 12/01/2020    Lower abdominal pain [R10.30] 12/01/2020    Intra-abdominal abscess ([address] [K65.1] 11/30/2020   2. Chief Complaint:  Chief Complaint   Patient presents with    Herpes Zoster    Fatigue           PLAN:     1. Will need colonoscopy after discharge, at 4-6 weeks after to evaluate for colon pathology as etiology  2. His prior surgical interventions(GB, ERCP, hernia repair, THR  and colonoscopy all done at either [facility] or [facility] in [facility] closer to his home they would prefer to come back to [address] [place] [address] for anything  3. Drain has had 260 out  4. Rec as drain output decreases, rescan for completeness of drainage prior to removal  5. Recommend outpatient referral discharge to gastroenterology for colonoscopy and likely EGD as he says every time he eats he gets a burning pain in his lower abdomen which is why he came in      SUBJECTIVE:     [facility] is doing well. Has no pain He has no nausea and no vomiting. He has passed flatus and has had a bowel movement. He is tolerating DIET CARDIAC;.  Current activity is ambulating in halls      OBJECTIVE:     Patient Vitals for the past 24 hrs:   BP Temp Temp src Pulse Resp SpO2   12/02/20 0415 105/76 98.4 °F (36.9 °C) Oral 71 16 97 %   12/02/20 0045 104/69 98.8 °F (37.1 °C) Oral 77 14 97 %   12/01/20 2042 104/73 98.6 °F (37 °C) Oral 64 16 96 %   12/01/20 1605 124/76 98.2 °F (36.8 °C) Oral 72 18 99 %   12/01/20 1208 133/75 97.9 °F (36.6 °C) Oral 76 18 96 %   12/01/20 0936 104/70 98.2 °F (36.8 °C) Oral 73 18 96 %   12/01/20 0910 (!) 101/45 -- -- 69 24 97 %   12/01/20 0906 (!) 109/47 -- -- 73 28 97 %   12/01/20 0901 (!) 127/57 -- -- 76 24 99 % 12/01/20 0856 (!) 134/57 -- -- 75 26 100 %   12/01/20 0755 107/64 98.3 °F (36.8 °C) Oral 65 16 97 %         Intake/Output Summary (Last 24 hours) at 12/2/2020 0636  Last data filed at 12/2/2020 0084  Gross per 24 hour   Intake 1701.91 ml   Output 410 ml   Net 1291.91 ml       In: 1701.9 [P.O.:300; I.V.:1401.9]  Out: 410 [Urine:150; Drains:260]    I/O last 3 completed shifts: In: 1676.5 [P.O.:250; I.V.:1426.5]  Out: 320 [Urine:150; Drains:170]     Date 12/02/20 0000 - 12/02/20 2359   Shift 5493-1869 0558-7985 5273-4124 24 Hour Total   INTAKE   P.O.(mL/kg/hr) 50   50   I. V.(mL/kg) 446.4(7.3)   446.4(7.3)   Shift Total(mL/kg) 496.4(8.1)   496.4(8.1)   OUTPUT   Drains(mL/kg) 90(1.5)   90(1.5)   Shift Total(mL/kg) 90(1.5)   90(1.5)   Weight (kg) 61.5 61.5 61.5 61.5       Wt Readings from Last 3 Encounters:   11/30/20 135 lb 9.6 oz (61.5 kg)        Body mass index is 20.62 kg/m². Diet: DIET CARDIAC;        MEDS:     Scheduled Meds:   sodium chloride flush  10 mL Intravenous 2 times per day    piperacillin-tazobactam  3.375 g Intravenous Q8H     Continuous Infusions:   sodium chloride 50 mL/hr at 12/02/20 0457     PRN Meds:sodium chloride flush, 10 mL, PRN  acetaminophen, 650 mg, Q6H PRN    Or  acetaminophen, 650 mg, Q6H PRN  polyethylene glycol, 17 g, Daily PRN  promethazine, 12.5 mg, Q6H PRN    Or  ondansetron, 4 mg, Q6H PRN  HYDROcodone-acetaminophen, 1 tablet, Q4H PRN          PHYSICAL EXAM:     CONSTITUTIONAL: Alert and oriented times 3, no acute distress and cooperative to examination.     ABDOMEN: non acute,  mild right sided tenderness, drain right flank, serous brown fluid  EXTREMITY: no cyanosis, clubbing or edema      LABS:     CBC:   Recent Labs     11/30/20  1033 12/01/20  0222   WBC 10.9* 9.6   RBC 4.16* 3.85*   HGB 11.2* 10.3*   HCT 36.0* 34.0*   MCV 86.5 88.3   MCH 26.9 26.8   MCHC 31.1* 30.3*    274   MPV 8.6* 9.1*      Last 3 CMP:   Recent Labs     11/30/20  1033 12/01/20 0222    136   K 4.1 3.8   CL 98 101   CO2 28 24   BUN 10 8   CREATININE 0.8 0.8   GLUCOSE 107 95   CALCIUM 9.5 9.1   PROT 7.2 6.5   LABALBU 3.4* 2.7*   BILITOT 0.4 0.3   ALKPHOS 65 54   AST 12 10   ALT 6* 6*      Troponin: No results for input(s): TROPONINI in the last 72 hours. Calcium:   Lab Results   Component Value Date    CALCIUM 9.1 12/01/2020    CALCIUM 9.5 11/30/2020      Ionized Calcium: No results found for: IONCA   Lipids: No results for input(s): CHOL, HDL in the last 72 hours. Invalid input(s): LDLCALCU  INR: No results for input(s): INR in the last 72 hours. Lactic Acid:   Lab Results   Component Value Date    LACTA 1.4 12/01/2020    LACTA 1.6 12/01/2020    LACTA 0.8 12/01/2020      Lab Results   Component Value Date    CEA 1.0 11/30/2020         Fluid Culture:  Gram Stain Result   Culture, Anaerobic and Aerobic   Collected:  12/01/20 0905    Result status:  Final    Resulting lab:  [facility]    Value: Many segmented neutrophils observed. No epithelial cells observed. Many gram negative bacilli. Many small gram positive bacilli. Few gram positive cocci occurring singly and in pairs             DVT prophylaxis: [] Lovenox                                 [] SCDs                                 [] SQ Heparin                                 [] Encourage ambulation, low risk for DVT, no chemical or mechanical prophylaxis necessary              [] Already on Anticoagulation      RADIOLOGY:      Ct Abdomen Pelvis Wo Contrast Additional Contrast? None    Result Date: 11/30/2020   1. 6.5 x 6.7 cm abnormal density behind the right lobe of the liver with areas of fluid and air consistent with inflammatory processes. 2. Malrotated right kidney with small stones. No hydronephrosis. 3. Small right pleural effusion and atelectasis or infiltrate at the right lung base. 4. Status post cholecystectomy. 5. Mild enlargement of the prostate gland.  6. Compression screw in the right femoral head and intramedullary ruth ann in the right femoral shaft. Left hip replacement in place. 7. Atherosclerotic calcification in the abdominal aorta and iliac arteries. 8. Lumbar spondylosis. 9. Otherwise negative CT scan of the abdomen and pelvis. . **This report has been created using voice recognition software. It may contain minor errors which are inherent in voice recognition technology. ** Final report electronically signed by DR Garo Deleon on 11/30/2020 11:28 AM    Ct Abscess Drainage W Cath Placement S&i    Result Date: 12/1/2020  Successful, uncomplicated CT guided placement of drainage catheter into right upper quadrant intra-abdominal abscess. **This report has been created using voice recognition software. It may contain minor errors which are inherent in voice recognition technology. ** Final report electronically signed by Dr Manoj Garber on 12/1/2020 9:39 AM    Ct Guided Needle Placement    Result Date: 12/1/2020  Successful, uncomplicated CT guided placement of drainage catheter into right upper quadrant intra-abdominal abscess. **This report has been created using voice recognition software. It may contain minor errors which are inherent in voice recognition technology. ** Final report electronically signed by Dr Manoj Garber on 12/1/2020 9:39 AM        Nayely Lord M.D.  F[facility]  Electronically signed 12/2/2020 at 6:36 AM

## 2020-12-02 NOTE — PROGRESS NOTES
Comprehensive Nutrition Assessment    Type and Reason for Visit:  Initial(Nursing phone call to see pt re: unplanned wt loss)    Nutrition Recommendations/Plan:   Continue cardiac diet. Send ensure compact BID. Send magic cup daily. Consider MVI. Nutrition Assessment:     Pt. moderately malnourished AEB criteria as listed below. At risk for further nutrition compromise r/t admit with intra abdominal abscess s/p drain placement 12/1, unplanned wt loss and underlying medical condition (hx CVA). Nutrition recommendations/interventions as per above. Malnutrition Assessment:  Malnutrition Status: Moderate malnutrition    Context:  Acute Illness     Findings of the 6 clinical characteristics of malnutrition:  Energy Intake:  No significant decrease in energy intake  Weight Loss:  Unable to assess     Body Fat Loss:  1 - Mild body fat loss Orbital   Muscle Mass Loss:  1 - Mild muscle mass loss Temples (temporalis)  Fluid Accumulation:  Unable to assess     Strength:  Not Performed    Estimated Daily Nutrient Needs:  Energy (kcal):  1845-2153kcals (30-35kcals/kgm for wt gain); Weight Used for Energy Requirements:  (61.5kgm (11/30))     Protein (g):  ~ 74+ grams as tolerated (1.2 grams protein/kgm); Weight Used for Protein Requirements:  (61.5kgm (11/30))            Nutrition Related Findings:  Pt seen with wife ~ 21 [phone]. He mentions poor appetite ~ 2-3 months & mentions had been eating < 50% at meals PTA ~ 2 months however intake appears to be improving here %. Labs: (12/10): Hemoglobin 10. 3. meds reviewed. BM x 1 (11/30)      Wounds:  None       Current Nutrition Therapies:    DIET CARDIAC;   Dietary Nutrition Supplements: Low Volume Supplement  Dietary Nutrition Supplements: Frozen Oral Supplement    Anthropometric Measures:  · Height: 5' 8\" (172.7 cm)  · Current Body Weight: 135 lb 9.6 oz (61.5 kg)   · Admission Body Weight: 135 lb 0.6 oz (61.3 kg)((11/30) no edema)    · Usual Body Weight: (no wts in

## 2020-12-02 NOTE — PROGRESS NOTES
Progress note: Infectious diseases    Patient - Nicol Maldonado,  Age - 65 y.o.    - 1950      Room Number - 6E-66/066-A   MRN -  [id number]   Acct # - [de-identified]  Date of Admission -  2020  9:17 AM    SUBJECTIVE:   He is feeling better, appetite is improving  Drainage slowing down  No fever, no leukocytosis  OBJECTIVE   VITALS    height is 5' 8\" (1.727 m) and weight is 135 lb 9.6 oz (61.5 kg). His oral temperature is 98.3 °F (36.8 °C). His blood pressure is 100/69 and his pulse is 74. His respiration is 16 and oxygen saturation is 96%.        Wt Readings from Last 3 Encounters:   20 135 lb 9.6 oz (61.5 kg)       I/O (24 Hours)    Intake/Output Summary (Last 24 hours) at 2020 1634  Last data filed at 2020 1335  Gross per 24 hour   Intake 1709.86 ml   Output 140 ml   Net 1569.86 ml       General Appearance  Awake, alert, oriented,     HEENT - normocephalic, atraumatic, slighlty pale conjunctiva,  anicteric sclera  Neck - Supple, no mass  Lungs -  Bilateral   air entry, no rhonchi, no wheeze  Cardiovascular - Heart sounds are normal.    Abdomen - soft, not distended, nontender, drain less  Neurologic -oriented  Skin - No bruising or bleeding  Extremities - No edema, no cyanosis, clubbing     MEDICATIONS:      sodium chloride flush  10 mL Intravenous 2 times per day    piperacillin-tazobactam  3.375 g Intravenous Q8H      sodium chloride 50 mL/hr at 20 0457     sodium chloride flush, acetaminophen **OR** acetaminophen, polyethylene glycol, promethazine **OR** ondansetron, HYDROcodone-acetaminophen      LABS:     CBC:   Recent Labs     20  1033 20  0222   WBC 10.9* 9.6   HGB 11.2* 10.3*    274     BMP:    Recent Labs     20  1033 20  0222    136   K 4.1 3.8   CL 98 101   CO2 28 24   BUN 10 8   CREATININE 0.8 0.8   GLUCOSE 107 95     Calcium:  Recent Labs 12/01/20  0222   CALCIUM 9.1    Hepatic:   Recent Labs     11/30/20  1033 12/01/20  0222   ALKPHOS 65 54   ALT 6* 6*   AST 12 10   PROT 7.2 6.5   BILITOT 0.4 0.3   LABALBU 3.4* 2.7*     Amylase and Lipase:  Recent Labs     12/02/20  1116   LACTA 1.2     Lactic Acid:   Recent Labs     12/02/20  1116   LACTA 1.2        CULTURES:   UA:   Recent Labs     11/30/20  1140   PHUR 5.5   COLORU DK YELLOW*   MUCUS THREADS   PROTEINU TRACE*   BLOODU NEGATIVE   RBCUA 3-5   WBCUA 2-4   BACTERIA NONE SEEN   NITRU NEGATIVE   GLUCOSEU NEGATIVE   BILIRUBINUR NEGATIVE   UROBILINOGEN 0.2   KETUA TRACE*     Micro:   Lab Results   Component Value Date    BC No growth-preliminary  12/01/2020          Problem list of patient:     Patient Active Problem List   Diagnosis Code    [address]-abdominal abscess [address] K65.1    Weight loss, unintentional R63.4    Lower abdominal pain R10.30    Moderate malnutrition (HCC) E44.0         ASSESSMENT/PLAN   intrabadominal abscess: Continue iv Zosyn.   Will scan the abdomen when the drainage slows down probable Friday      Gerald Rodriguez MD, [facility] 12/2/2020 4:34 PM

## 2020-12-02 NOTE — PROGRESS NOTES
INTERNAL MEDICINE SPECIALTIES  Progress Note For Dr Yenifer Zapata       Patient:  Tika De Paz  YOB: 1950  Date of Service: 12/2/2020  MRN: [id number]   Acct:  [de-identified]   Primary Care Physician: Leila Aguilera MD    SUBJECTIVE:  Patient has no new complaints presently. Home Medications:   No current facility-administered medications on file prior to encounter. No current outpatient medications on file prior to encounter. Scheduled Meds:   sodium chloride flush  10 mL Intravenous 2 times per day    piperacillin-tazobactam  3.375 g Intravenous Q8H     Continuous Infusions:   sodium chloride 50 mL/hr at 12/02/20 0457     PRN Meds:sodium chloride flush, acetaminophen **OR** acetaminophen, polyethylene glycol, promethazine **OR** ondansetron, HYDROcodone-acetaminophen        Allergies:  Patient has no known allergies. OBJECTIVE:    Vitals:   Vitals:    12/02/20 0732   BP: 127/77   Pulse: 80   Resp: 16   Temp: 97.9 °F (36.6 °C)   SpO2: 98%      BMI: Body mass index is 20.62 kg/m². PHYSICAL EXAMINATION:               General appearance:  No apparent distress, appears stated age and cooperative. HEENT:  Normal cephalic, atraumatic without obvious deformity. Pupils equal, round, and reactive to light. Extra ocular muscles intact. Conjunctivae/corneas clear. Neck: Supple   Respiratory:  Normal respiratory effort. Clear to auscultation, bilaterally without Rales/Wheezes/Rhonchi. Cardiovascular:  Regular rhythm with normal S1/S2 without murmurs, rubs or gallops. Abdomen: Full , truclose drain in place right flank, soft,  Tender++ RUQ,  normal bowel sounds. Musculoskeletal:  No clubbing, cyanosis or edema bilaterally. Full range of motion without deformity. Skin: Skin color, texture, turgor normal.  No rashes or lesions. Neurologic: Alert and oriented X 3, Neurovascularly intact without any focal motor deficits.    Psychiatric:   Thought content appropriate, normal insight    Review of Labs and Diagnostic Testing:    Recent Results (from the past 24 hour(s))   Culture, Blood 2    Collection Time: 12/01/20 11:50 AM    Specimen: Blood   Result Value Ref Range    Blood Culture, Routine No growth-preliminary     Lactic acid, plasma    Collection Time: 12/01/20 11:50 AM   Result Value Ref Range    Lactic Acid 1.6 0.5 - 2.2 mmol/L   Lactic acid, plasma    Collection Time: 12/01/20  9:27 PM   Result Value Ref Range    Lactic Acid 1.4 0.5 - 2.2 mmol/L       Radiology:     Ct Abdomen Pelvis Wo Contrast Additional Contrast? None    Result Date: 11/30/2020  PROCEDURE: CT ABDOMEN PELVIS WO CONTRAST CLINICAL INFORMATION: LLQ ABD PAIN . COMPARISON: None. TECHNIQUE: Axial 5 mm CT images were obtained through the abdomen and pelvis. No contrast was given. Coronal and sagittal reconstructions were obtained. All CT scans at this facility use dose modulation, iterative reconstruction, and/or weight-based dosing when appropriate to reduce radiation dose to as low as reasonably achievable. FINDINGS: There is abnormal density in the right lower lobe posteriorly consistent with inflammatory process. The base of the heart is within appropriate limits. This is 6.5 x 6.7 cm abnormal density behind the right lobe of liver with areas of fluid and air consistent with inflammatory process The liver is grossly normal. The spleen is normal. The adrenal glands and pancreas are grossly normal. The patient is status post cholecystectomy. 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 There is no hydronephrosis. The left kidney appears unremarkable. . No abnormalities of the small bowel loops are noted. There is atherosclerotic calcification abdominal aorta and iliac arteries bilaterally. There is no adenopathy. The urinary bladder is normal. There is an enlarged prostate gland measuring 3.5 x 3.7 cm in size. There is no pelvic free fluid.   There is abnormal density adjacent to the right colon consistent with inflammatory processes. There is no adenopathy. There  is a left hip replacement in place. There is a compression screw in the right femoral head and an intramedullary ruth ann in the right femoral shaft. There is lumbar spondylosis      1. 6.5 x 6.7 cm abnormal density behind the right lobe of the liver with areas of fluid and air consistent with inflammatory processes. 2. Malrotated right kidney with small stones. No hydronephrosis. 3. Small right pleural effusion and atelectasis or infiltrate at the right lung base. 4. Status post cholecystectomy. 5. Mild enlargement of the prostate gland. 6. Compression screw in the right femoral head and intramedullary ruth ann in the right femoral shaft. Left hip replacement in place. 7. Atherosclerotic calcification in the abdominal aorta and iliac arteries. 8. Lumbar spondylosis. 9. Otherwise negative CT scan of the abdomen and pelvis. . **This report has been created using voice recognition software. It may contain minor errors which are inherent in voice recognition technology. ** Final report electronically signed by DR Lilian Camilo on 11/30/2020 11:28 AM        ASSESMENT:      Active Problems:    Intra-abdominal abscess ([address]    Weight loss, unintentional    Lower abdominal pain  Resolved Problems:    * No resolved hospital problems. *  Unintentional weight loss  Anorexia    PLAN:  Follow-up  Blood cultures/ culture of drain, observe output of drain and plan rescanning the abdomen prior to removal of the Chinedu-Close drain. IVF,  continue zosyn, appreciate ID/Surgical consult. Continue pain management.   He indicates his last colonoscopy was about 13 years back will require elective colonoscopy/EGD once infection resolves         DVT prophylaxis: [] Lovenox                                 [x] SCDs                                 [] SQ Heparin                                 [] Encourage ambulation, low risk for DVT, no chemical or mechanical prophylaxis necessary              [] Already on Anticoagulation                Anticipated Disposition upon discharge: [] Home                                                                         [] Home with Home Health                                                                         [] [facility]                                                                         [] [address]          Electronically signed by Meron Cruz MD on 12/2/2020 at 10:57 AM

## 2020-12-03 PROCEDURE — 6370000000 HC RX 637 (ALT 250 FOR IP): Performed by: INTERNAL MEDICINE

## 2020-12-03 PROCEDURE — 6360000002 HC RX W HCPCS: Performed by: INTERNAL MEDICINE

## 2020-12-03 PROCEDURE — 1200000000 HC SEMI PRIVATE

## 2020-12-03 PROCEDURE — 94760 N-INVAS EAR/PLS OXIMETRY 1: CPT

## 2020-12-03 PROCEDURE — 2580000003 HC RX 258: Performed by: INTERNAL MEDICINE

## 2020-12-03 RX ADMIN — PIPERACILLIN AND TAZOBACTAM 3.38 G: 3; .375 INJECTION, POWDER, LYOPHILIZED, FOR SOLUTION INTRAVENOUS at 00:58

## 2020-12-03 RX ADMIN — Medication 10 ML: at 20:53

## 2020-12-03 RX ADMIN — ENOXAPARIN SODIUM 40 MG: 40 INJECTION SUBCUTANEOUS at 17:16

## 2020-12-03 RX ADMIN — PIPERACILLIN AND TAZOBACTAM 3.38 G: 3; .375 INJECTION, POWDER, LYOPHILIZED, FOR SOLUTION INTRAVENOUS at 10:18

## 2020-12-03 RX ADMIN — ACETAMINOPHEN 650 MG: 325 TABLET ORAL at 01:49

## 2020-12-03 RX ADMIN — PIPERACILLIN AND TAZOBACTAM 3.38 G: 3; .375 INJECTION, POWDER, LYOPHILIZED, FOR SOLUTION INTRAVENOUS at 17:16

## 2020-12-03 RX ADMIN — ACETAMINOPHEN 650 MG: 325 TABLET ORAL at 20:47

## 2020-12-03 RX ADMIN — ACETAMINOPHEN 650 MG: 325 TABLET ORAL at 14:46

## 2020-12-03 RX ADMIN — ACETAMINOPHEN 650 MG: 325 TABLET ORAL at 08:12

## 2020-12-03 ASSESSMENT — PAIN DESCRIPTION - PAIN TYPE
TYPE: ACUTE PAIN
TYPE: ACUTE PAIN

## 2020-12-03 ASSESSMENT — PAIN SCALES - GENERAL
PAINLEVEL_OUTOF10: 2
PAINLEVEL_OUTOF10: 3
PAINLEVEL_OUTOF10: 2
PAINLEVEL_OUTOF10: 3
PAINLEVEL_OUTOF10: 3
PAINLEVEL_OUTOF10: 4
PAINLEVEL_OUTOF10: 4
PAINLEVEL_OUTOF10: 3
PAINLEVEL_OUTOF10: 4

## 2020-12-03 ASSESSMENT — PAIN DESCRIPTION - DESCRIPTORS
DESCRIPTORS: BURNING
DESCRIPTORS: BURNING;DISCOMFORT;SORE

## 2020-12-03 ASSESSMENT — PAIN DESCRIPTION - FREQUENCY
FREQUENCY: CONTINUOUS
FREQUENCY: CONTINUOUS

## 2020-12-03 ASSESSMENT — PAIN DESCRIPTION - PROGRESSION
CLINICAL_PROGRESSION: NOT CHANGED
CLINICAL_PROGRESSION: GRADUALLY IMPROVING

## 2020-12-03 ASSESSMENT — PAIN DESCRIPTION - ORIENTATION
ORIENTATION: RIGHT;MID
ORIENTATION: MID;LEFT;RIGHT

## 2020-12-03 ASSESSMENT — PAIN DESCRIPTION - ONSET
ONSET: ON-GOING
ONSET: ON-GOING

## 2020-12-03 ASSESSMENT — PAIN DESCRIPTION - LOCATION
LOCATION: ABDOMEN
LOCATION: ABDOMEN

## 2020-12-03 NOTE — PROGRESS NOTES
INTERNAL MEDICINE SPECIALTIES  Progress Note For Dr Marjorie Daily       Patient:  Caitie Rodriges  YOB: 1950  Date of Service: 12/3/2020  MRN: [id number]   Acct:  [de-identified]   Primary Care Physician: Leopoldo Mccormack MD    SUBJECTIVE:  Was met sitting up in bed, states that the pain is better      Home Medications:   No current facility-administered medications on file prior to encounter. No current outpatient medications on file prior to encounter. Scheduled Meds:   sodium chloride flush  10 mL Intravenous 2 times per day    piperacillin-tazobactam  3.375 g Intravenous Q8H     Continuous Infusions:   sodium chloride 50 mL/hr at 12/02/20 2308     PRN Meds:sodium chloride flush, acetaminophen **OR** acetaminophen, polyethylene glycol, promethazine **OR** ondansetron, HYDROcodone-acetaminophen        Allergies:  Patient has no known allergies. OBJECTIVE:    Vitals:   Vitals:    12/03/20 1212   BP: 110/70   Pulse: 69   Resp: 18   Temp: 97.5 °F (36.4 °C)   SpO2: 97%      BMI: Body mass index is 20.62 kg/m². PHYSICAL EXAMINATION:               General appearance:  No apparent distress, appears stated age and cooperative. HEENT:  Normal cephalic, atraumatic without obvious deformity. Pupils equal, round, and reactive to light. Extra ocular muscles intact. Conjunctivae/corneas clear. Neck: Supple   Respiratory:  Normal respiratory effort. Clear to auscultation, bilaterally without Rales/Wheezes/Rhonchi. Cardiovascular:  Regular rhythm with normal S1/S2 without murmurs, rubs or gallops. Abdomen: Full , truclose drain in place right flank, soft,  Tender+ RUQ,  normal bowel sounds. Musculoskeletal:  No clubbing, cyanosis or edema bilaterally. Full range of motion without deformity. Skin: Skin color, texture, turgor normal.  No rashes or lesions. Neurologic: Alert and oriented X 3, Neurovascularly intact without any focal motor deficits.    Psychiatric:   Thought content appropriate, normal insight    Review of Labs and Diagnostic Testing:    No results found for this or any previous visit (from the past 24 hour(s)). Radiology:     Ct Abdomen Pelvis Wo Contrast Additional Contrast? None    Result Date: 11/30/2020  PROCEDURE: CT ABDOMEN PELVIS WO CONTRAST CLINICAL INFORMATION: LLQ ABD PAIN . COMPARISON: None. TECHNIQUE: Axial 5 mm CT images were obtained through the abdomen and pelvis. No contrast was given. Coronal and sagittal reconstructions were obtained. All CT scans at this facility use dose modulation, iterative reconstruction, and/or weight-based dosing when appropriate to reduce radiation dose to as low as reasonably achievable. FINDINGS: There is abnormal density in the right lower lobe posteriorly consistent with inflammatory process. The base of the heart is within appropriate limits. This is 6.5 x 6.7 cm abnormal density behind the right lobe of liver with areas of fluid and air consistent with inflammatory process The liver is grossly normal. The spleen is normal. The adrenal glands and pancreas are grossly normal. The patient is status post cholecystectomy. 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 There is no hydronephrosis. The left kidney appears unremarkable. . No abnormalities of the small bowel loops are noted. There is atherosclerotic calcification abdominal aorta and iliac arteries bilaterally. There is no adenopathy. The urinary bladder is normal. There is an enlarged prostate gland measuring 3.5 x 3.7 cm in size. There is no pelvic free fluid. There is abnormal density adjacent to the right colon consistent with inflammatory processes. There is no adenopathy. There  is a left hip replacement in place. There is a compression screw in the right femoral head and an intramedullary ruth ann in the right femoral shaft.  There is lumbar spondylosis      1. 6.5 x 6.7 cm abnormal density behind the right lobe of the liver with areas of

## 2020-12-03 NOTE — PLAN OF CARE
Problem: Pain:  Goal: Pain level will decrease  Description: Pain level will decrease  Outcome: Ongoing  Goal: Control of acute pain  Description: Control of acute pain  Outcome: Ongoing  Goal: Control of chronic pain  Description: Control of chronic pain  Outcome: Ongoing     Problem: Musculor/Skeletal Functional Status  Goal: Highest potential functional level  Outcome: Ongoing   Care plan reviewed with patient and spouse. Patient and spouse verbalize understanding of the plan of care and contribute to goal setting.

## 2020-12-03 NOTE — FLOWSHEET NOTE
Pt was in bed as his wife was visiting. He was calm and hopeful. He was dealing with intra-abdominal abscess. He wanted to know about [place] where I come from and I saw that it was important to her and we talked about it. Prayer was appreciated. 12/03/20 1514   Encounter Summary   Services provided to: Patient and family together   Referral/Consult From: [id number] [facility]   Continue Visiting Yes  (12/3)   Complexity of Encounter Moderate   Length of Encounter 30 minutes   Spiritual/Catholic   Type Spiritual support   Assessment Approachable;Calm   Intervention Empowerment;Sustaining presence/ Ministry of presence;Prayer;Nurtured hope; Active listening   Outcome Connection/belonging;Expressed gratitude;Encouraged; Hopeful;Receptive

## 2020-12-03 NOTE — PROGRESS NOTES
Progress note: Infectious diseases    Patient - Sarath Skinner,  Age - 65 y.o.    - 1950      Room Number - 6E-66/066-A   MRN -  [id number]   Acct # - [de-identified]  Date of Admission -  2020  9:17 AM    SUBJECTIVE:   No new issues  Has still drainage: not properly recorded  OBJECTIVE   VITALS    height is 5' 8\" (1.727 m) and weight is 135 lb 9.6 oz (61.5 kg). His oral temperature is 97.1 °F (36.2 °C). His blood pressure is 144/84 (abnormal) and his pulse is 77. His respiration is 18 and oxygen saturation is 98%. Wt Readings from Last 3 Encounters:   20 135 lb 9.6 oz (61.5 kg)       I/O (24 Hours)    Intake/Output Summary (Last 24 hours) at 12/3/2020 1027  Last data filed at 12/3/2020 0938  Gross per 24 hour   Intake 3048. 45 ml   Output 480 ml   Net 2568.45 ml       General Appearance  Awake, alert, oriented,     HEENT - normocephalic, atraumatic, slighlty pale conjunctiva,  anicteric sclera  Neck - Supple, no mass  Lungs -  Bilateral   air entry, no rhonchi, no wheeze  Cardiovascular - Heart sounds are normal.    Abdomen - soft, not distended, nontender, drain less  Neurologic -oriented  Skin - No bruising or bleeding  Extremities - No edema, no cyanosis, clubbing     MEDICATIONS:      sodium chloride flush  10 mL Intravenous 2 times per day    piperacillin-tazobactam  3.375 g Intravenous Q8H      sodium chloride 50 mL/hr at 20 2308     sodium chloride flush, acetaminophen **OR** acetaminophen, polyethylene glycol, promethazine **OR** ondansetron, HYDROcodone-acetaminophen      LABS:     CBC:   Recent Labs     20  1033 20  0222   WBC 10.9* 9.6   HGB 11.2* 10.3*    274     BMP:    Recent Labs     20  1033 20  022    136   K 4.1 3.8   CL 98 101   CO2 28 24   BUN 10 8   CREATININE 0.8 0.8   GLUCOSE 107 95     Calcium:  Recent Labs     20   CALCIUM 9.1 Hepatic:   Recent Labs     11/30/20  1033 12/01/20  0222   ALKPHOS 65 54   ALT 6* 6*   AST 12 10   PROT 7.2 6.5   BILITOT 0.4 0.3   LABALBU 3.4* 2.7*     Amylase and Lipase:  Recent Labs     12/02/20  1116   LACTA 1.2     Lactic Acid:   Recent Labs     12/02/20  1116   LACTA 1.2        CULTURES:   UA:   Recent Labs     11/30/20  1140   PHUR 5.5   COLORU DK YELLOW*   MUCUS THREADS   PROTEINU TRACE*   BLOODU NEGATIVE   RBCUA 3-5   WBCUA 2-4   BACTERIA NONE SEEN   NITRU NEGATIVE   GLUCOSEU NEGATIVE   BILIRUBINUR NEGATIVE   UROBILINOGEN 0.2   KETUA TRACE*     Micro:   Lab Results   Component Value Date    BC No growth-preliminary  12/01/2020          Problem list of patient:     Patient Active Problem List   Diagnosis Code    Intra-abdominal abscess ([facility] [address] K65.1    Weight loss, unintentional R63.4    Lower abdominal pain R10.30    Moderate malnutrition (HCC) E44.0         ASSESSMENT/PLAN   intrabadominal abscess: Continue iv Zosyn. Will monitor out put today. Will plan for CT tomorrow depending with the drainage.   Discussed with nursing staff to properly document drainage /flushing saline  Redd Hidalgo MD, FACP 12/3/2020 10:27 AM

## 2020-12-03 NOTE — PROGRESS NOTES
Physician Progress Note      Ja Rivera  [facility] #:                  [id number]  :                       1950  ADMIT DATE:       2020 9:17 AM  DISCH DATE:  RESPONDING  PROVIDER #:        Moo Whitten MD          QUERY TEXT:    Pt admitted with intra-abdominal abscess. Noted documentation of moderate   malnutrition in consult note  by dietician consultant. If possible,   please document in progress notes and discharge summary:    The medical record reflects the following:    Risk Factors: unplanned weight loss  Clinical Indicators: moderate malnutrition per dietician per AND/ASPEN   guidelines as evidenced by  Mild body fat loss Orbital , Mild muscle mass loss   Temples (temporalis)  Treatment: Dietician consult, Ensure Compact BID and magic cup daily    Thank you! Keila Damon CRCR  RN Clinical   P: [phone]  Options provided:  -- Moderate malnutrition confirmed present on admission  -- Moderate malnutrition confirmed not present on admission  -- Moderate malnutrition ruled out  -- Other - I will add my own diagnosis  -- Disagree - Not applicable / Not valid  -- Disagree - Clinically unable to determine / Unknown  -- Refer to Clinical Documentation Reviewer    PROVIDER RESPONSE TEXT:    The diagnosis of moderate malnutrition was confirmed as present on admission.     Query created by: Case Murdock on 2020 2:28 PM      Electronically signed by:  Moo Whitten MD 2020 7:05 PM

## 2020-12-04 ENCOUNTER — APPOINTMENT (OUTPATIENT)
Dept: INTERVENTIONAL RADIOLOGY/VASCULAR | Age: 70
DRG: 372 | End: 2020-12-04

## 2020-12-04 ENCOUNTER — APPOINTMENT (OUTPATIENT)
Dept: CT IMAGING | Age: 70
DRG: 372 | End: 2020-12-04

## 2020-12-04 LAB
ALBUMIN SERPL-MCNC: 3 G/DL (ref 3.5–5.1)
ALP BLD-CCNC: 53 U/L (ref 38–126)
ALT SERPL-CCNC: 6 U/L (ref 11–66)
ANION GAP SERPL CALCULATED.3IONS-SCNC: 14 MEQ/L (ref 8–16)
AST SERPL-CCNC: 13 U/L (ref 5–40)
BASOPHILS # BLD: 0.8 %
BASOPHILS ABSOLUTE: 0 THOU/MM3 (ref 0–0.1)
BILIRUB SERPL-MCNC: 0.2 MG/DL (ref 0.3–1.2)
BUN BLDV-MCNC: 4 MG/DL (ref 7–22)
CALCIUM SERPL-MCNC: 9.2 MG/DL (ref 8.5–10.5)
CHLORIDE BLD-SCNC: 106 MEQ/L (ref 98–111)
CO2: 23 MEQ/L (ref 23–33)
CREAT SERPL-MCNC: 0.6 MG/DL (ref 0.4–1.2)
EOSINOPHIL # BLD: 1.9 %
EOSINOPHILS ABSOLUTE: 0.1 THOU/MM3 (ref 0–0.4)
ERYTHROCYTE [DISTWIDTH] IN BLOOD BY AUTOMATED COUNT: 15.5 % (ref 11.5–14.5)
ERYTHROCYTE [DISTWIDTH] IN BLOOD BY AUTOMATED COUNT: 49.2 FL (ref 35–45)
GFR SERPL CREATININE-BSD FRML MDRD: > 90 ML/MIN/1.73M2
GLUCOSE BLD-MCNC: 92 MG/DL (ref 70–108)
HCT VFR BLD CALC: 34.9 % (ref 42–52)
HEMOGLOBIN: 10.6 GM/DL (ref 14–18)
IMMATURE GRANS (ABS): 0.02 THOU/MM3 (ref 0–0.07)
IMMATURE GRANULOCYTES: 0.5 %
LYMPHOCYTES # BLD: 47 %
LYMPHOCYTES ABSOLUTE: 1.7 THOU/MM3 (ref 1–4.8)
MCH RBC QN AUTO: 26.7 PG (ref 26–33)
MCHC RBC AUTO-ENTMCNC: 30.4 GM/DL (ref 32.2–35.5)
MCV RBC AUTO: 87.9 FL (ref 80–94)
MONOCYTES # BLD: 6.2 %
MONOCYTES ABSOLUTE: 0.2 THOU/MM3 (ref 0.4–1.3)
NUCLEATED RED BLOOD CELLS: 0 /100 WBC
PLATELET # BLD: 322 THOU/MM3 (ref 130–400)
PMV BLD AUTO: 8.7 FL (ref 9.4–12.4)
POTASSIUM REFLEX MAGNESIUM: 3.8 MEQ/L (ref 3.5–5.2)
RBC # BLD: 3.97 MILL/MM3 (ref 4.7–6.1)
SEG NEUTROPHILS: 43.6 %
SEGMENTED NEUTROPHILS ABSOLUTE COUNT: 1.6 THOU/MM3 (ref 1.8–7.7)
SODIUM BLD-SCNC: 143 MEQ/L (ref 135–145)
TOTAL PROTEIN: 6.6 G/DL (ref 6.1–8)
WBC # BLD: 3.7 THOU/MM3 (ref 4.8–10.8)

## 2020-12-04 PROCEDURE — 6370000000 HC RX 637 (ALT 250 FOR IP): Performed by: INTERNAL MEDICINE

## 2020-12-04 PROCEDURE — 80053 COMPREHEN METABOLIC PANEL: CPT

## 2020-12-04 PROCEDURE — 74176 CT ABD & PELVIS W/O CONTRAST: CPT

## 2020-12-04 PROCEDURE — 6360000002 HC RX W HCPCS: Performed by: INTERNAL MEDICINE

## 2020-12-04 PROCEDURE — 36415 COLL VENOUS BLD VENIPUNCTURE: CPT

## 2020-12-04 PROCEDURE — 1200000000 HC SEMI PRIVATE

## 2020-12-04 PROCEDURE — 85025 COMPLETE CBC W/AUTO DIFF WBC: CPT

## 2020-12-04 PROCEDURE — 2580000003 HC RX 258: Performed by: INTERNAL MEDICINE

## 2020-12-04 PROCEDURE — 99232 SBSQ HOSP IP/OBS MODERATE 35: CPT | Performed by: SURGERY

## 2020-12-04 RX ADMIN — Medication 10 ML: at 08:08

## 2020-12-04 RX ADMIN — ACETAMINOPHEN 650 MG: 325 TABLET ORAL at 08:08

## 2020-12-04 RX ADMIN — ACETAMINOPHEN 650 MG: 325 TABLET ORAL at 21:03

## 2020-12-04 RX ADMIN — PIPERACILLIN AND TAZOBACTAM 3.38 G: 3; .375 INJECTION, POWDER, LYOPHILIZED, FOR SOLUTION INTRAVENOUS at 16:26

## 2020-12-04 RX ADMIN — ENOXAPARIN SODIUM 40 MG: 40 INJECTION SUBCUTANEOUS at 16:30

## 2020-12-04 RX ADMIN — PIPERACILLIN AND TAZOBACTAM 3.38 G: 3; .375 INJECTION, POWDER, LYOPHILIZED, FOR SOLUTION INTRAVENOUS at 09:24

## 2020-12-04 RX ADMIN — ACETAMINOPHEN 650 MG: 325 TABLET ORAL at 03:11

## 2020-12-04 RX ADMIN — Medication 10 ML: at 21:03

## 2020-12-04 RX ADMIN — PIPERACILLIN AND TAZOBACTAM 3.38 G: 3; .375 INJECTION, POWDER, LYOPHILIZED, FOR SOLUTION INTRAVENOUS at 00:37

## 2020-12-04 RX ADMIN — ACETAMINOPHEN 650 MG: 325 TABLET ORAL at 14:00

## 2020-12-04 ASSESSMENT — PAIN SCALES - GENERAL
PAINLEVEL_OUTOF10: 2
PAINLEVEL_OUTOF10: 3
PAINLEVEL_OUTOF10: 2

## 2020-12-04 ASSESSMENT — PAIN DESCRIPTION - LOCATION
LOCATION: ABDOMEN

## 2020-12-04 ASSESSMENT — PAIN DESCRIPTION - FREQUENCY: FREQUENCY: CONTINUOUS

## 2020-12-04 ASSESSMENT — PAIN DESCRIPTION - PAIN TYPE
TYPE: ACUTE PAIN

## 2020-12-04 ASSESSMENT — PAIN DESCRIPTION - ORIENTATION
ORIENTATION: MID;RIGHT
ORIENTATION: MID

## 2020-12-04 ASSESSMENT — PAIN DESCRIPTION - ONSET: ONSET: ON-GOING

## 2020-12-04 ASSESSMENT — PAIN - FUNCTIONAL ASSESSMENT: PAIN_FUNCTIONAL_ASSESSMENT: ACTIVITIES ARE NOT PREVENTED

## 2020-12-04 ASSESSMENT — PAIN DESCRIPTION - DESCRIPTORS: DESCRIPTORS: ACHING

## 2020-12-04 ASSESSMENT — PAIN DESCRIPTION - PROGRESSION: CLINICAL_PROGRESSION: NOT CHANGED

## 2020-12-04 NOTE — PROGRESS NOTES
Progress note: Infectious diseases    Patient - Michelle Loja,  Age - 65 y.o.    - 1950      Room Number - 6E-66/066-A   MRN -  [id number]   Acct # - [de-identified]  Date of Admission -  2020  9:17 AM    SUBJECTIVE:   No new issues  No drainage noted other than what is being flushed  No fever  OBJECTIVE   VITALS    height is 5' 8\" (1.727 m) and weight is 135 lb 9.6 oz (61.5 kg). His oral temperature is 97.8 °F (36.6 °C). His blood pressure is 142/91 (abnormal) and his pulse is 71. His respiration is 16 and oxygen saturation is 97%. Wt Readings from Last 3 Encounters:   20 135 lb 9.6 oz (61.5 kg)       I/O (24 Hours)    Intake/Output Summary (Last 24 hours) at 2020 1001  Last data filed at 2020 0315  Gross per 24 hour   Intake 450.98 ml   Output 80 ml   Net 370.98 ml       General Appearance  Awake, alert, oriented,     HEENT - normocephalic, atraumatic, slighlty pale conjunctiva,  anicteric sclera  Neck - Supple, no mass. Lungs -  Bilateral   air entry, no rhonchi, no wheeze.   Cardiovascular - Heart sounds are normal.    Abdomen - soft, not distended, nontender, no drainage  Neurologic -oriented  Skin - No bruising or bleeding  Extremities - No edema, no cyanosis, clubbing     MEDICATIONS:      enoxaparin  40 mg Subcutaneous Daily    sodium chloride flush  10 mL Intravenous 2 times per day    piperacillin-tazobactam  3.375 g Intravenous Q8H       sodium chloride flush, acetaminophen **OR** acetaminophen, polyethylene glycol, promethazine **OR** ondansetron, HYDROcodone-acetaminophen      LABS:     CBC:   Recent Labs     20  0636   WBC 3.7*   HGB 10.6*        BMP:    Recent Labs     20  0636      K 3.8      CO2 23   BUN 4*   CREATININE 0.6   GLUCOSE 92     Calcium:  Recent Labs     20  0636   CALCIUM 9.2    Hepatic:   Recent Labs     20  0636   ALKPHOS 53   ALT 6*   AST 13   PROT 6.6   BILITOT 0.2*   LABALBU 3.0*     Amylase and Lipase:  Recent Labs     12/02/20  1116   LACTA 1.2     Lactic Acid:   Recent Labs     12/02/20  1116   LACTA 1.2         Problem list of patient:     Patient Active Problem List   Diagnosis Code    Intra-abdominal abscess ([facility] [address] K65.1    Weight loss, unintentional R63.4    Lower abdominal pain R10.30    Moderate malnutrition (HCC) E44.0         ASSESSMENT/PLAN   intrabadominal abscess: Continue iv Zosyn. Will do CT scan today. If the abscess is resolved, will discharge him with oral augmentin for two wks.   Mary Vanegas MD, FACP 12/4/2020 10:01 AM

## 2020-12-04 NOTE — PROGRESS NOTES
INTERNAL MEDICINE SPECIALTIES  Progress Note For Dr Alfred Man       Patient:  Taisha Carey  YOB: 1950  Date of Service: 12/4/2020  MRN: [id number]   Acct:  [de-identified]   Primary Care Physician: Rajwinder Mejia MD    SUBJECTIVE: mid/ RUQ pain improved      Home Medications:   No current facility-administered medications on file prior to encounter. No current outpatient medications on file prior to encounter. Scheduled Meds:   enoxaparin  40 mg Subcutaneous Daily    sodium chloride flush  10 mL Intravenous 2 times per day    piperacillin-tazobactam  3.375 g Intravenous Q8H     Continuous Infusions:    PRN Meds:sodium chloride flush, acetaminophen **OR** acetaminophen, polyethylene glycol, promethazine **OR** ondansetron, HYDROcodone-acetaminophen        Allergies:  Patient has no known allergies. OBJECTIVE:    Vitals:   Vitals:    12/04/20 0312   BP: 128/78   Pulse: 64   Resp: 18   Temp: 97.5 °F (36.4 °C)   SpO2: 98%      BMI: Body mass index is 20.62 kg/m². PHYSICAL EXAMINATION:               General appearance:  No apparent distress, appears stated age and cooperative. HEENT:  Normal cephalic, atraumatic without obvious deformity. Pupils equal, round, and reactive to light. Extra ocular muscles intact. Conjunctivae/corneas clear. Neck: Supple   Respiratory:  Normal respiratory effort. Clear to auscultation, bilaterally without Rales/Wheezes/Rhonchi. Cardiovascular:  Regular rhythm with normal S1/S2 without murmurs, rubs or gallops. Abdomen: Full , truclose drain in place right flank, soft,  Tender+ RUQ,  normal bowel sounds. Musculoskeletal:  No clubbing, cyanosis or edema bilaterally. Full range of motion without deformity. Skin: Skin color, texture, turgor normal.  No rashes or lesions. Neurologic: Alert and oriented X 3, Neurovascularly intact without any focal motor deficits.    Psychiatric:   Thought content appropriate, normal insight    Review of Labs and Diagnostic Testing:    Recent Results (from the past 24 hour(s))   CBC auto differential    Collection Time: 12/04/20  6:36 AM   Result Value Ref Range    WBC 3.7 (L) 4.8 - 10.8 thou/mm3    RBC 3.97 (L) 4.70 - 6.10 mill/mm3    Hemoglobin 10.6 (L) 14.0 - 18.0 gm/dl    Hematocrit 34.9 (L) 42.0 - 52.0 %    MCV 87.9 80.0 - 94.0 fL    MCH 26.7 26.0 - 33.0 pg    MCHC 30.4 (L) 32.2 - 35.5 gm/dl    RDW-CV 15.5 (H) 11.5 - 14.5 %    RDW-SD 49.2 (H) 35.0 - 45.0 fL    Platelets 622 703 - 981 thou/mm3    MPV 8.7 (L) 9.4 - 12.4 fL    Seg Neutrophils 43.6 %    Lymphocytes 47.0 %    Monocytes 6.2 %    Eosinophils 1.9 %    Basophils 0.8 %    Immature Granulocytes 0.5 %    Segs Absolute 1.6 (L) 1.8 - 7.7 thou/mm3    Lymphocytes Absolute 1.7 1.0 - 4.8 thou/mm3    Monocytes Absolute 0.2 (L) 0.4 - 1.3 thou/mm3    Eosinophils Absolute 0.1 0.0 - 0.4 thou/mm3    Basophils Absolute 0.0 0.0 - 0.1 thou/mm3    Immature Grans (Abs) 0.02 0.00 - 0.07 thou/mm3    nRBC 0 /100 wbc       Radiology:     Ct Abdomen Pelvis Wo Contrast Additional Contrast? None    Result Date: 11/30/2020  PROCEDURE: CT ABDOMEN PELVIS WO CONTRAST CLINICAL INFORMATION: LLQ ABD PAIN . COMPARISON: None. TECHNIQUE: Axial 5 mm CT images were obtained through the abdomen and pelvis. No contrast was given. Coronal and sagittal reconstructions were obtained. All CT scans at this facility use dose modulation, iterative reconstruction, and/or weight-based dosing when appropriate to reduce radiation dose to as low as reasonably achievable. FINDINGS: There is abnormal density in the right lower lobe posteriorly consistent with inflammatory process. The base of the heart is within appropriate limits. This is 6.5 x 6.7 cm abnormal density behind the right lobe of liver with areas of fluid and air consistent with inflammatory process The liver is grossly normal. The spleen is normal. The adrenal glands and pancreas are grossly normal. The patient is status post cholecystectomy.   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 There is no hydronephrosis. The left kidney appears unremarkable. . No abnormalities of the small bowel loops are noted. There is atherosclerotic calcification abdominal aorta and iliac arteries bilaterally. There is no adenopathy. The urinary bladder is normal. There is an enlarged prostate gland measuring 3.5 x 3.7 cm in size. There is no pelvic free fluid. There is abnormal density adjacent to the right colon consistent with inflammatory processes. There is no adenopathy. There  is a left hip replacement in place. There is a compression screw in the right femoral head and an intramedullary ruth ann in the right femoral shaft. There is lumbar spondylosis      1. 6.5 x 6.7 cm abnormal density behind the right lobe of the liver with areas of fluid and air consistent with inflammatory processes. 2. Malrotated right kidney with small stones. No hydronephrosis. 3. Small right pleural effusion and atelectasis or infiltrate at the right lung base. 4. Status post cholecystectomy. 5. Mild enlargement of the prostate gland. 6. Compression screw in the right femoral head and intramedullary ruth ann in the right femoral shaft. Left hip replacement in place. 7. Atherosclerotic calcification in the abdominal aorta and iliac arteries. 8. Lumbar spondylosis. 9. Otherwise negative CT scan of the abdomen and pelvis. . **This report has been created using voice recognition software. It may contain minor errors which are inherent in voice recognition technology. ** Final report electronically signed by DR Obie Giron on 11/30/2020 11:28 AM        ASSESMENT:      Active Problems:    Intra-abdominal abscess ([address]    Weight loss, unintentional    Lower abdominal pain    Moderate malnutrition ([address]  Resolved Problems:    * No resolved hospital problems.  *  Unintentional weight loss  Anorexia    PLAN:  Culture of abscess had shown strep viridans species, E coli, coagulase positive Staph, blood cultures remain negative,observe output of drain and plan rescanning the abdomen prior to removal of the Chinedu-Close drain, Continue zosyn, ID/Surgical  Service is following. Plan for repeat CT scan today. Continue pain management. His last colonoscopy was about 13 years back will require elective colonoscopy/EGD once infection resolves to rule out the GI tract as potential source.      Dr. [facility] covers 12/4 to 12/07/2020         DVT prophylaxis: [] Lovenox                                 [x] SCDs                                 [] SQ Heparin                                 [] Encourage ambulation, low risk for DVT, no chemical or mechanical prophylaxis necessary              [] Already on Anticoagulation                Anticipated Disposition upon discharge: [] Home                                                                         [] Home with Home Health                                                                         [] [facility]                                                                         [] [address]          Electronically signed by Rima Newton MD on 12/4/2020 at 7:06 AM

## 2020-12-04 NOTE — PROGRESS NOTES
True Close drain flushed without difficulty two times during my shift with a total of 20 ml of saline. The total drainage for this 12 hours shift was 100 ml with 80 ml being true drainage. Drainage extracted from drain site very pungent in odor. Drainage does appear to be clearing up. When flushing and extracting flushed amount back into syring, extracted fluid was clear with small bloody fragments.

## 2020-12-04 NOTE — PROGRESS NOTES
Dex Farr M.D. FACS  Daily Progress Note    Pt Name: Ben Hall  Medical Record Number: [id number]  Date of Birth 1950   Today's Date: 12/4/2020    ASSESSMENT:     1. HD # [id number]    Diagnosis Date Noted    Moderate malnutrition ([address] [E44.0] 12/02/2020    Weight loss, unintentional [R63.4] 12/01/2020    Lower abdominal pain [R10.30] 12/01/2020    Intra-abdominal abscess [facility]) [K65.1] 11/30/2020       Chief Complaint:  Chief Complaint   Patient presents with    Herpes Zoster    Fatigue           PLAN:     1. Will need colonoscopy after discharge, at 4-6 weeks after to evaluate for colon pathology as etiology  2. His prior surgical interventions(GB, ERCP, hernia repair, THR  and colonoscopy all done at either [facility] or [facility] in [facility] closer to his home they would prefer to come back to [facility] for anything  3. Drain has had 80 out since yesterday, for repeat CT scan today  4. Recommend outpatient referral discharge to gastroenterology for colonoscopy and likely EGD as he says every time he eats he gets a burning pain in his lower abdomen which is why he came in      SUBJECTIVE:     Jennifer Monteiro is doing well. Has no pain He has no nausea and no vomiting. He has passed flatus and has had a bowel movement. He is tolerating DIET CARDIAC; Dietary Nutrition Supplements: Low Volume Supplement  Dietary Nutrition Supplements: Frozen Oral Supplement.  Current activity is ambulating in halls      OBJECTIVE:     Patient Vitals for the past 24 hrs:   BP Temp Temp src Pulse Resp SpO2   12/04/20 0312 128/78 97.5 °F (36.4 °C) Oral 64 18 98 %   12/03/20 2001 111/72 97.8 °F (36.6 °C) Oral 67 18 97 %   12/03/20 1511 119/75 97.8 °F (36.6 °C) Oral 68 22 100 %   12/03/20 1433 -- -- -- -- -- 99 %   12/03/20 1212 110/70 97.5 °F (36.4 °C) Oral 69 18 97 %   12/03/20 1023 116/78 97.2 °F (36.2 °C) Oral 75 16 100 %         Intake/Output Summary (Last 24 hours) at 12/4/2020 0625  Last data filed at 12/4/2020 0315  Gross per 24 hour   Intake 690.98 ml   Output 80 ml   Net 610.98 ml       In: 691 [P.O.:540; I.V.:151]  Out: 80 [Drains:80]    I/O last 3 completed shifts: In: 3522 [P.O.:1040; I.V.:721]  Out: 410 [Urine:200; Drains:210]     Date 12/04/20 0000 - 12/04/20 2359   Shift 2403-1810 8357-6186 8188-6602 24 Hour Total   INTAKE   P.O.(mL/kg/hr) 100   100   Shift Total(mL/kg) 100(1.6)   100(1.6)   OUTPUT   Shift Total(mL/kg)       Weight (kg) 61.5 61.5 61.5 61.5       Wt Readings from Last 3 Encounters:   11/30/20 135 lb 9.6 oz (61.5 kg)        Body mass index is 20.62 kg/m². Diet: DIET CARDIAC; Dietary Nutrition Supplements: Low Volume Supplement  Dietary Nutrition Supplements: Frozen Oral Supplement        MEDS:     Scheduled Meds:   enoxaparin  40 mg Subcutaneous Daily    sodium chloride flush  10 mL Intravenous 2 times per day    piperacillin-tazobactam  3.375 g Intravenous Q8H     Continuous Infusions:    PRN Meds:sodium chloride flush, 10 mL, PRN  acetaminophen, 650 mg, Q6H PRN    Or  acetaminophen, 650 mg, Q6H PRN  polyethylene glycol, 17 g, Daily PRN  promethazine, 12.5 mg, Q6H PRN    Or  ondansetron, 4 mg, Q6H PRN  HYDROcodone-acetaminophen, 1 tablet, Q4H PRN          PHYSICAL EXAM:     CONSTITUTIONAL: Alert and oriented times 3, no acute distress and cooperative to examination. ABDOMEN: non acute,  mild right sided tenderness, drain right flank, serous brown fluid  EXTREMITY: no cyanosis, clubbing or edema      LABS:     CBC:   No results for input(s): WBC, RBC, HGB, HCT, MCV, MCH, MCHC, RDW, PLT, MPV in the last 72 hours. Last 3 CMP:   No results for input(s): NA, K, CL, CO2, BUN, CREATININE, GLUCOSE, CALCIUM, PROT, LABALBU, BILITOT, ALKPHOS, AST, ALT in the last 72 hours. Troponin: No results for input(s): TROPONINI in the last 72 hours.   Calcium:   Lab Results   Component Value Date    CALCIUM 9.1 12/01/2020    CALCIUM 9.5 11/30/2020 Ionized Calcium: No results found for: IONCA   Lipids: No results for input(s): CHOL, HDL in the last 72 hours. Invalid input(s): LDLCALCU  INR: No results for input(s): INR in the last 72 hours. Lactic Acid:   Lab Results   Component Value Date    LACTA 1.2 12/02/2020    LACTA 1.4 12/01/2020    LACTA 1.6 12/01/2020      Lab Results   Component Value Date    CEA 1.0 11/30/2020         Fluid Culture:  Gram Stain Result   Culture, Anaerobic and Aerobic   Collected:  12/01/20 0905    Result status:  Final    Resulting lab:  [facility]    Value: Many segmented neutrophils observed. No epithelial cells observed. Many gram negative bacilli. Many small gram positive bacilli. Few gram positive cocci occurring singly and in pairs             DVT prophylaxis: [] Lovenox                                 [] SCDs                                 [] SQ Heparin                                 [] Encourage ambulation, low risk for DVT, no chemical or mechanical prophylaxis necessary              [] Already on Anticoagulation      RADIOLOGY:      Ct Abdomen Pelvis Wo Contrast Additional Contrast? None    Result Date: 11/30/2020   1. 6.5 x 6.7 cm abnormal density behind the right lobe of the liver with areas of fluid and air consistent with inflammatory processes. 2. Malrotated right kidney with small stones. No hydronephrosis. 3. Small right pleural effusion and atelectasis or infiltrate at the right lung base. 4. Status post cholecystectomy. 5. Mild enlargement of the prostate gland. 6. Compression screw in the right femoral head and intramedullary ruth ann in the right femoral shaft. Left hip replacement in place. 7. Atherosclerotic calcification in the abdominal aorta and iliac arteries. 8. Lumbar spondylosis. 9. Otherwise negative CT scan of the abdomen and pelvis. . **This report has been created using voice recognition software.  It may contain minor errors which are inherent in voice recognition technology. ** Final report electronically signed by DR Grace Coronado on 11/30/2020 11:28 AM    Ct Abscess Drainage W Cath Placement S&i    Result Date: 12/1/2020  Successful, uncomplicated CT guided placement of drainage catheter into right upper quadrant intra-abdominal abscess. **This report has been created using voice recognition software. It may contain minor errors which are inherent in voice recognition technology. ** Final report electronically signed by Dr Jayden Joaquin on 12/1/2020 9:39 AM    Ct Guided Needle Placement    Result Date: 12/1/2020  Successful, uncomplicated CT guided placement of drainage catheter into right upper quadrant intra-abdominal abscess. **This report has been created using voice recognition software. It may contain minor errors which are inherent in voice recognition technology. ** Final report electronically signed by Dr Jayden Joaquin on 12/1/2020 9:39 AM        Sandra Lozano M.D.  FACS  Electronically signed 12/4/2020 at 6:25 AM

## 2020-12-05 VITALS
HEART RATE: 67 BPM | WEIGHT: 135.6 LBS | BODY MASS INDEX: 20.55 KG/M2 | OXYGEN SATURATION: 98 % | RESPIRATION RATE: 16 BRPM | TEMPERATURE: 97.7 F | HEIGHT: 68 IN | DIASTOLIC BLOOD PRESSURE: 88 MMHG | SYSTOLIC BLOOD PRESSURE: 139 MMHG

## 2020-12-05 LAB
AEROBIC CULTURE: ABNORMAL
ANAEROBIC CULTURE: ABNORMAL
GRAM STAIN RESULT: ABNORMAL
ORGANISM: ABNORMAL

## 2020-12-05 PROCEDURE — 6360000002 HC RX W HCPCS: Performed by: INTERNAL MEDICINE

## 2020-12-05 PROCEDURE — 94760 N-INVAS EAR/PLS OXIMETRY 1: CPT

## 2020-12-05 PROCEDURE — 6370000000 HC RX 637 (ALT 250 FOR IP): Performed by: INTERNAL MEDICINE

## 2020-12-05 PROCEDURE — 99231 SBSQ HOSP IP/OBS SF/LOW 25: CPT | Performed by: SURGERY

## 2020-12-05 PROCEDURE — 2580000003 HC RX 258: Performed by: INTERNAL MEDICINE

## 2020-12-05 RX ORDER — GREEN TEA/HOODIA GORDONII 315-12.5MG
1 CAPSULE ORAL DAILY
Qty: 30 TABLET | Refills: 0 | Status: SHIPPED | OUTPATIENT
Start: 2020-12-05 | End: 2021-01-04

## 2020-12-05 RX ORDER — POLYETHYLENE GLYCOL 3350 17 G/17G
17 POWDER, FOR SOLUTION ORAL DAILY PRN
Qty: 527 G | Refills: 1 | COMMUNITY
Start: 2020-12-05 | End: 2021-01-04

## 2020-12-05 RX ORDER — AMOXICILLIN AND CLAVULANATE POTASSIUM 875; 125 MG/1; MG/1
1 TABLET, FILM COATED ORAL 2 TIMES DAILY
Qty: 28 TABLET | Refills: 0 | Status: SHIPPED | OUTPATIENT
Start: 2020-12-05 | End: 2020-12-19

## 2020-12-05 RX ADMIN — ACETAMINOPHEN 650 MG: 325 TABLET ORAL at 05:32

## 2020-12-05 RX ADMIN — PIPERACILLIN AND TAZOBACTAM 3.38 G: 3; .375 INJECTION, POWDER, LYOPHILIZED, FOR SOLUTION INTRAVENOUS at 08:23

## 2020-12-05 RX ADMIN — PIPERACILLIN AND TAZOBACTAM 3.38 G: 3; .375 INJECTION, POWDER, LYOPHILIZED, FOR SOLUTION INTRAVENOUS at 01:08

## 2020-12-05 RX ADMIN — ACETAMINOPHEN 650 MG: 325 TABLET ORAL at 12:55

## 2020-12-05 ASSESSMENT — PAIN SCALES - GENERAL
PAINLEVEL_OUTOF10: 3
PAINLEVEL_OUTOF10: 0
PAINLEVEL_OUTOF10: 3
PAINLEVEL_OUTOF10: 0
PAINLEVEL_OUTOF10: 3

## 2020-12-05 NOTE — PROGRESS NOTES
Patient arrived to floor via wheelchair from ED. Placed in suicide precautions. Sitter at bedside. Patient oriented to room 6E-67 and unit.

## 2020-12-05 NOTE — PROGRESS NOTES
Fernando Robles M.D., FACS  Daily Progress Note    Pt Name: Abbey Velazco  Medical Record Number: [id number]  Date of Birth 1950   Today's Date: 12/5/2020    ASSESSMENT:     1. HD # [address] Problems    Diagnosis Date Noted    Moderate malnutrition ([address] [E44.0] 12/02/2020    Weight loss, unintentional [R63.4] 12/01/2020    Lower abdominal pain [R10.30] 12/01/2020    Intra-abdominal abscess [facility]) [K65.1] 11/30/2020       Chief Complaint:  Chief Complaint   Patient presents with    Herpes Zoster    Fatigue           PLAN:     1. Will need colonoscopy after discharge, at 4-6 weeks after to evaluate for colon pathology as etiology  2. His prior surgical interventions(GB, ERCP, hernia repair, THR  and colonoscopy all done at either [facility] or [facility] in [facility] closer to his home they would prefer to come back to [facility] for anything  3. Recommend outpatient referral discharge to gastroenterology for colonoscopy and likely EGD as he says every time he eats he gets a burning pain in his lower abdomen which is why he came in  4. OK for discharge home       SUBJECTIVE:     Kyrie Fisher is doing well. Has no pain He has no nausea and no vomiting. He has passed flatus and has had a bowel movement. He is tolerating DIET CARDIAC; Dietary Nutrition Supplements: Low Volume Supplement  Dietary Nutrition Supplements: Frozen Oral Supplement.  Current activity is ambulating in halls      OBJECTIVE:     Patient Vitals for the past 24 hrs:   BP Temp Temp src Pulse Resp SpO2   12/05/20 0819 139/88 97.7 °F (36.5 °C) Oral 67 16 98 %   12/05/20 0732 -- -- -- -- -- 98 %   12/05/20 0514 (!) 144/89 97.8 °F (36.6 °C) Oral 67 16 98 %   12/04/20 2103 125/85 97.5 °F (36.4 °C) Oral 82 16 97 %   12/04/20 1355 127/79 97.8 °F (36.6 °C) Oral 73 18 98 %         Intake/Output Summary (Last 24 hours) at 12/5/2020 1022  Last data filed at 12/5/2020 0927  Gross per 24 hour   Intake 1520 ml   Output using voice recognition software. It may contain minor errors which are inherent in voice recognition technology. ** Final report electronically signed by DR Claudine Chinchilla on 11/30/2020 11:28 AM    Ct Abscess Drainage W Cath Placement S&i    Result Date: 12/1/2020  Successful, uncomplicated CT guided placement of drainage catheter into right upper quadrant intra-abdominal abscess. **This report has been created using voice recognition software. It may contain minor errors which are inherent in voice recognition technology. ** Final report electronically signed by Dr Jeffrey Dubose on 12/1/2020 9:39 AM    Ct Guided Needle Placement    Result Date: 12/1/2020  Successful, uncomplicated CT guided placement of drainage catheter into right upper quadrant intra-abdominal abscess. **This report has been created using voice recognition software. It may contain minor errors which are inherent in voice recognition technology. ** Final report electronically signed by Dr Jeffrey Dubose on 12/1/2020 9:39 AM        Galina Kim M.D.     Electronically signed 12/5/2020 at 10:22 AM

## 2020-12-06 LAB — BLOOD CULTURE, ROUTINE: NORMAL

## 2020-12-06 NOTE — DISCHARGE SUMMARY
[address] [place] [address]                               DISCHARGE SUMMARY    PATIENT NAME: Efra Camacho                        :        1950  MED REC NO:   [id number]                           ROOM:       2016  ACCOUNT NO:   [de-identified]                           ADMIT DATE: 2020  PROVIDER:     MAHENDRA Paige: 2020    CLINICAL SUMMARY    Seen for Lexus Post MD    HOSPITAL COURSE:  This is a 75-year-old male admitted for abdominal  pain. The patient did have a CAT scan that showed a 6.5 x 6.7 cm  abdominal density. Hence both ID and Surgery were consulted for  possible intraabdominal abscess. The patient did undergo drainage  placement through Interventional Radiology. He is placed on  antibiotics. Culture showed moderate growth of Streptococcus viridans. The patient was continued on Zosyn. He was afebrile. Repeat CT on  2020 showed near complete resolution of his abscess. ID  recommended two more weeks of Augmentin. The patient was tolerating  diet fairly well. He did not require any narcotics after the drain was  removed. The patient has been okayed for discharge by consultants. FINAL DIAGNOSES:  1. Intraabdominal abscess status post IR drainage of peritoneal  abscess, now removed. 2.  Moderate malnutrition. 3.  Unconditional weight loss, to have workup done as an outpatient. 4.  Anemia and leukopenia. DISCHARGE MEDICATIONS:  To continue as addressed in discharge sheet. DISPOSITION:  Home. DIET:  As tolerated. ACTIVITY:  Avoid exertion. FOLLOWUP:  With family physician. CONDITION ON DISCHARGE:  Stable. DISPOSITION:  Home. TIME SPENT:  I spent more than 30 minutes that involved examining the  patient, reconciling the med rec sheet and reviewing the chart.         Yael Kim M.D.    D: 2020 9:27:02       T: 2020 10:41:29     [email or web address]  Job#: [id number]     Doc#: [id number]    CC:

## 2020-12-07 LAB — BLOOD CULTURE, ROUTINE: NORMAL

## 2021-01-02 LAB — MISC. #1 REFERENCE GROUP TEST: NORMAL

## 2021-11-29 ENCOUNTER — APPOINTMENT (OUTPATIENT)
Dept: GENERAL RADIOLOGY | Age: 71
DRG: 441 | End: 2021-11-29
Payer: COMMERCIAL

## 2021-11-29 ENCOUNTER — HOSPITAL ENCOUNTER (INPATIENT)
Age: 71
LOS: 9 days | Discharge: HOME | DRG: 441 | End: 2021-12-08
Attending: EMERGENCY MEDICINE | Admitting: INTERNAL MEDICINE
Payer: COMMERCIAL

## 2021-11-29 ENCOUNTER — APPOINTMENT (OUTPATIENT)
Dept: CT IMAGING | Age: 71
DRG: 441 | End: 2021-11-29
Payer: COMMERCIAL

## 2021-11-29 DIAGNOSIS — N20.0 KIDNEY STONE: ICD-10-CM

## 2021-11-29 DIAGNOSIS — L02.91 ABSCESS: ICD-10-CM

## 2021-11-29 DIAGNOSIS — R91.8 PULMONARY NODULES: ICD-10-CM

## 2021-11-29 DIAGNOSIS — R91.1 LUNG NODULE: ICD-10-CM

## 2021-11-29 DIAGNOSIS — K65.1 ABSCESS OF ABDOMINAL CAVITY (HCC): Primary | ICD-10-CM

## 2021-11-29 LAB
ALBUMIN SERPL-MCNC: 3.2 G/DL (ref 3.5–5.1)
ALP BLD-CCNC: 77 U/L (ref 38–126)
ALT SERPL-CCNC: < 5 U/L (ref 11–66)
ANION GAP SERPL CALCULATED.3IONS-SCNC: 9 MEQ/L (ref 8–16)
AST SERPL-CCNC: 13 U/L (ref 5–40)
BASOPHILS # BLD: 0.4 %
BASOPHILS ABSOLUTE: 0 THOU/MM3 (ref 0–0.1)
BILIRUB SERPL-MCNC: 0.4 MG/DL (ref 0.3–1.2)
BILIRUBIN DIRECT: < 0.2 MG/DL (ref 0–0.3)
BUN BLDV-MCNC: 9 MG/DL (ref 7–22)
CALCIUM SERPL-MCNC: 9.1 MG/DL (ref 8.5–10.5)
CEA: 1.2 NG/ML (ref 0–5)
CHLORIDE BLD-SCNC: 100 MEQ/L (ref 98–111)
CO2: 25 MEQ/L (ref 23–33)
CREAT SERPL-MCNC: 0.6 MG/DL (ref 0.4–1.2)
EKG ATRIAL RATE: 72 BPM
EKG P AXIS: 58 DEGREES
EKG P-R INTERVAL: 126 MS
EKG Q-T INTERVAL: 394 MS
EKG QRS DURATION: 82 MS
EKG QTC CALCULATION (BAZETT): 431 MS
EKG R AXIS: 17 DEGREES
EKG T AXIS: 52 DEGREES
EKG VENTRICULAR RATE: 72 BPM
EOSINOPHIL # BLD: 0.1 %
EOSINOPHILS ABSOLUTE: 0 THOU/MM3 (ref 0–0.4)
ERYTHROCYTE [DISTWIDTH] IN BLOOD BY AUTOMATED COUNT: 16.3 % (ref 11.5–14.5)
ERYTHROCYTE [DISTWIDTH] IN BLOOD BY AUTOMATED COUNT: 51.2 FL (ref 35–45)
GFR SERPL CREATININE-BSD FRML MDRD: > 90 ML/MIN/1.73M2
GLUCOSE BLD-MCNC: 91 MG/DL (ref 70–108)
HCT VFR BLD CALC: 36.5 % (ref 42–52)
HEMOGLOBIN: 10.9 GM/DL (ref 14–18)
IMMATURE GRANS (ABS): 0.06 THOU/MM3 (ref 0–0.07)
IMMATURE GRANULOCYTES: 0.6 %
LIPASE: 27.9 U/L (ref 5.6–51.3)
LYMPHOCYTES # BLD: 13.7 %
LYMPHOCYTES ABSOLUTE: 1.4 THOU/MM3 (ref 1–4.8)
MCH RBC QN AUTO: 25.9 PG (ref 26–33)
MCHC RBC AUTO-ENTMCNC: 29.9 GM/DL (ref 32.2–35.5)
MCV RBC AUTO: 86.7 FL (ref 80–94)
MONOCYTES # BLD: 7.4 %
MONOCYTES ABSOLUTE: 0.8 THOU/MM3 (ref 0.4–1.3)
NUCLEATED RED BLOOD CELLS: 0 /100 WBC
PLATELET # BLD: 276 THOU/MM3 (ref 130–400)
PMV BLD AUTO: 8.4 FL (ref 9.4–12.4)
POTASSIUM REFLEX MAGNESIUM: 3.8 MEQ/L (ref 3.5–5.2)
PRO-BNP: 1090 PG/ML (ref 0–900)
RBC # BLD: 4.21 MILL/MM3 (ref 4.7–6.1)
SARS-COV-2, NAAT: NOT  DETECTED
SEG NEUTROPHILS: 77.8 %
SEGMENTED NEUTROPHILS ABSOLUTE COUNT: 8.1 THOU/MM3 (ref 1.8–7.7)
SODIUM BLD-SCNC: 134 MEQ/L (ref 135–145)
TOTAL PROTEIN: 7.2 G/DL (ref 6.1–8)
TROPONIN T: < 0.01 NG/ML
WBC # BLD: 10.4 THOU/MM3 (ref 4.8–10.8)

## 2021-11-29 PROCEDURE — 87040 BLOOD CULTURE FOR BACTERIA: CPT

## 2021-11-29 PROCEDURE — 80048 BASIC METABOLIC PNL TOTAL CA: CPT

## 2021-11-29 PROCEDURE — 74177 CT ABD & PELVIS W/CONTRAST: CPT

## 2021-11-29 PROCEDURE — 82378 CARCINOEMBRYONIC ANTIGEN: CPT

## 2021-11-29 PROCEDURE — 99285 EMERGENCY DEPT VISIT HI MDM: CPT

## 2021-11-29 PROCEDURE — 93010 ELECTROCARDIOGRAM REPORT: CPT | Performed by: INTERNAL MEDICINE

## 2021-11-29 PROCEDURE — 36415 COLL VENOUS BLD VENIPUNCTURE: CPT

## 2021-11-29 PROCEDURE — 6360000004 HC RX CONTRAST MEDICATION

## 2021-11-29 PROCEDURE — 6370000000 HC RX 637 (ALT 250 FOR IP)

## 2021-11-29 PROCEDURE — 83880 ASSAY OF NATRIURETIC PEPTIDE: CPT

## 2021-11-29 PROCEDURE — 6370000000 HC RX 637 (ALT 250 FOR IP): Performed by: REGISTERED NURSE

## 2021-11-29 PROCEDURE — 84484 ASSAY OF TROPONIN QUANT: CPT

## 2021-11-29 PROCEDURE — 2580000003 HC RX 258: Performed by: INTERNAL MEDICINE

## 2021-11-29 PROCEDURE — 93005 ELECTROCARDIOGRAM TRACING: CPT

## 2021-11-29 PROCEDURE — 87635 SARS-COV-2 COVID-19 AMP PRB: CPT

## 2021-11-29 PROCEDURE — 80076 HEPATIC FUNCTION PANEL: CPT

## 2021-11-29 PROCEDURE — 83690 ASSAY OF LIPASE: CPT

## 2021-11-29 PROCEDURE — 2580000003 HC RX 258

## 2021-11-29 PROCEDURE — 71046 X-RAY EXAM CHEST 2 VIEWS: CPT

## 2021-11-29 PROCEDURE — 85025 COMPLETE CBC W/AUTO DIFF WBC: CPT

## 2021-11-29 PROCEDURE — 6360000002 HC RX W HCPCS: Performed by: INTERNAL MEDICINE

## 2021-11-29 PROCEDURE — 1200000003 HC TELEMETRY R&B

## 2021-11-29 RX ORDER — ACETAMINOPHEN 650 MG/1
650 SUPPOSITORY RECTAL EVERY 6 HOURS PRN
Status: DISCONTINUED | OUTPATIENT
Start: 2021-11-29 | End: 2021-12-08 | Stop reason: HOSPADM

## 2021-11-29 RX ORDER — SODIUM CHLORIDE 0.9 % (FLUSH) 0.9 %
5-40 SYRINGE (ML) INJECTION EVERY 12 HOURS SCHEDULED
Status: DISCONTINUED | OUTPATIENT
Start: 2021-11-29 | End: 2021-12-08 | Stop reason: HOSPADM

## 2021-11-29 RX ORDER — SODIUM CHLORIDE, SODIUM LACTATE, POTASSIUM CHLORIDE, AND CALCIUM CHLORIDE .6; .31; .03; .02 G/100ML; G/100ML; G/100ML; G/100ML
1000 INJECTION, SOLUTION INTRAVENOUS ONCE
Status: DISCONTINUED | OUTPATIENT
Start: 2021-11-29 | End: 2021-11-29

## 2021-11-29 RX ORDER — ACETAMINOPHEN 325 MG/1
650 TABLET ORAL ONCE
Status: COMPLETED | OUTPATIENT
Start: 2021-11-29 | End: 2021-11-29

## 2021-11-29 RX ORDER — ACETAMINOPHEN 325 MG/1
650 TABLET ORAL EVERY 6 HOURS PRN
Status: DISCONTINUED | OUTPATIENT
Start: 2021-11-29 | End: 2021-12-08 | Stop reason: HOSPADM

## 2021-11-29 RX ORDER — SODIUM CHLORIDE 9 MG/ML
25 INJECTION, SOLUTION INTRAVENOUS PRN
Status: DISCONTINUED | OUTPATIENT
Start: 2021-11-29 | End: 2021-12-08 | Stop reason: HOSPADM

## 2021-11-29 RX ORDER — PROMETHAZINE HYDROCHLORIDE 25 MG/1
12.5 TABLET ORAL EVERY 6 HOURS PRN
Status: DISCONTINUED | OUTPATIENT
Start: 2021-11-29 | End: 2021-12-08 | Stop reason: HOSPADM

## 2021-11-29 RX ORDER — ONDANSETRON 2 MG/ML
4 INJECTION INTRAMUSCULAR; INTRAVENOUS EVERY 6 HOURS PRN
Status: DISCONTINUED | OUTPATIENT
Start: 2021-11-29 | End: 2021-12-08 | Stop reason: HOSPADM

## 2021-11-29 RX ORDER — SODIUM CHLORIDE 0.9 % (FLUSH) 0.9 %
5-40 SYRINGE (ML) INJECTION PRN
Status: DISCONTINUED | OUTPATIENT
Start: 2021-11-29 | End: 2021-12-08 | Stop reason: HOSPADM

## 2021-11-29 RX ORDER — 0.9 % SODIUM CHLORIDE 0.9 %
1000 INTRAVENOUS SOLUTION INTRAVENOUS ONCE
Status: COMPLETED | OUTPATIENT
Start: 2021-11-29 | End: 2021-11-29

## 2021-11-29 RX ADMIN — VANCOMYCIN HYDROCHLORIDE 1000 MG: 1 INJECTION, POWDER, LYOPHILIZED, FOR SOLUTION INTRAVENOUS at 20:20

## 2021-11-29 RX ADMIN — ACETAMINOPHEN 650 MG: 325 TABLET ORAL at 21:52

## 2021-11-29 RX ADMIN — SODIUM CHLORIDE 1000 ML: 9 INJECTION, SOLUTION INTRAVENOUS at 12:27

## 2021-11-29 RX ADMIN — ACETAMINOPHEN 650 MG: 325 TABLET ORAL at 12:16

## 2021-11-29 RX ADMIN — ACETAMINOPHEN 650 MG: 325 TABLET ORAL at 14:36

## 2021-11-29 RX ADMIN — IOPAMIDOL 80 ML: 755 INJECTION, SOLUTION INTRAVENOUS at 13:27

## 2021-11-29 ASSESSMENT — PAIN DESCRIPTION - ORIENTATION: ORIENTATION: LOWER

## 2021-11-29 ASSESSMENT — PAIN SCALES - GENERAL
PAINLEVEL_OUTOF10: 3
PAINLEVEL_OUTOF10: 0
PAINLEVEL_OUTOF10: 4
PAINLEVEL_OUTOF10: 3

## 2021-11-29 ASSESSMENT — PAIN DESCRIPTION - PAIN TYPE: TYPE: ACUTE PAIN

## 2021-11-29 ASSESSMENT — PAIN DESCRIPTION - LOCATION: LOCATION: BACK

## 2021-11-29 NOTE — ED NOTES
Pt and vs reassessed RR even and unlabored. Pt resting in bed alert after using urinal. No distress noted.  Pt stable at this time     Xochitl Camacho, [facility]  11/29/21 0755

## 2021-11-29 NOTE — ED NOTES
ED to inpatient nurses report    Chief Complaint   Patient presents with    Anorexia      Present to ED from home  LOC: alert and orientated to name, place, date  Vital signs   Vitals:    11/29/21 1123 11/29/21 1228 11/29/21 1327 11/29/21 1426   BP: (!) 146/90 130/77 116/75 124/74   Pulse: 93 92 72 77   Resp: 18 18 18 18   Temp: 98.3 °F (36.8 °C)      TempSrc: Oral      SpO2: 97% 98% 100% 100%   Weight:       Height:          Oxygen Baseline 99%    Current needs required RA   LDAs:   Peripheral IV 11/29/21 Right Forearm (Active)   Site Assessment Clean;  Intact; Dry 11/29/21 1428   Line Status Normal saline locked 11/29/21 1428   Dressing Status Clean; Dry; Intact 11/29/21 1428     Mobility: Independent  Pending ED orders: none  Present condition: stable    Electronically signed by Brandi Cordero RN on 11/29/2021 at 3:24 PM       Brandi Cordero RN  11/29/21 0318

## 2021-11-29 NOTE — ED NOTES
Pt and vs reassessed. RR even and unlabored. Pt resting in bed alert and denies any needs. No distress noted.  Pt stable at this time     Gaby Cantu, [place]  11/29/21 7796

## 2021-11-29 NOTE — ED NOTES
ED nurse-to-nurse bedside report    Chief Complaint   Patient presents with    Anorexia      LOC: alert and orientated to name, place, date  Vital signs   Vitals:    11/29/21 1122 11/29/21 1123 11/29/21 1228   BP:  (!) 146/90 130/77   Pulse:  93 92   Resp:  18 18   Temp:  98.3 °F (36.8 °C)    TempSrc:  Oral    SpO2:  97% 98%   Weight: 125 lb (56.7 kg)     Height: 5' 8\" (1.727 m)        Pain:    Pain Interventions: see MAR  Pain Goal: 2/10  Oxygen: No    Current needs required RA   Telemetry: Yes  LDAs:   Peripheral IV 11/29/21 Right Forearm (Active)     Continuous Infusions:   Mobility: Requires assistance * 1  Craven Fall Risk Score: No flowsheet data found.   Fall Interventions: call light in place, family at bedside, side rails up x2  Report given to: Sharon Nath RN  11/29/21 0614

## 2021-11-29 NOTE — ED PROVIDER NOTES
[address] [facility] [address] [address] EMERGENCY DEPT  Faculty Attestation    I performed a history and physical examination of the patient and discussed management with the resident. I reviewed the residents note and agree with the documented findings and plan of care. Any areas of disagreement are noted on the chart. I was personally present for the key portions of any procedures. I have documented in the chart those procedures where I was not present during the key portions. I have reviewed the emergency nurses triage note. I agree with the chief complaint, past medical history, past surgical history, allergies, medications, social, and family history as documented unless otherwise noted below. This is a 78-year-old male who presents to the emergency department for evaluation of worsening recent abdominal pain  Associated with poor appetite for the last several months  No nausea or vomiting  No genitourinary or stool complaints  No chest pain, palpitations, or near syncope  No shortness of breath, cough, or wheezing  Of note patient was admitted last November for an intra-abdominal abscess that was drained by IR  Patient states that it was an incidental finding and he did not have abdominal pain at that time  Review of systems otherwise negative  He has no additional complaints at this time    On examination he appears in no acute distress  Nursing notes and vital signs reviewed  Heart is regular in rate and rhythm   Lungs are clear to auscultation  Abdomen is soft with generalized discomfort, worse in the right upper quadrant  Negative Renteria sign  Has some involuntary guarding  Normal bowel sounds  Normal peripheral perfusion and skin turgor  Skin is warm and dry    EKG at 12:16 PM shows a normal sinus rhythm with a rate of 72 bpm.  Intervals within normal limits. Normal axis. R wave progression is maintained. No consistent T wave inversion. No ST segment depression/elevation or evidence for acute ischemia/infarction.   No arrhythmia.     Labs and imaging reviewed  Patient will be admitted for further work-up and consultation for drainage of the intra-abdominal abscess      Arlys Call, DO  Attending Emergency Physician        Tami Petersen DO  11/29/21 8496

## 2021-11-29 NOTE — ED NOTES
Pt appears to be resting comfortably at this time. Pt medicated. EKG complete. Family at bedside.  Call light in place     Neha Graham RN  11/29/21 2098

## 2021-11-29 NOTE — H&P
Internal Medicine Specialties  H&P  11/29/2021  5:24 PM    Patient:  Judy Houser  YOB: 1950    MRN: [id number]   Acct:  [de-identified]   24/024A  Primary Care Physician: Hussein Cherry MD    Chief Complaint:  Chief Complaint   Patient presents with    Anorexia       History of Present Illness: The patient is a 66 y.o. male with no significant pmhx who presents with abdominal pain over the past few months with lack of appetite. He had similar symptoms this time last year and was tx for abdominal abscess--drained by IR. He did have unintentional weight loss at that time also, which has continued this year. He denies any chest pain, SOB, nausea, vomiting, diarrhea. His only complaint is diffuse abdominal pain and poor intake. In the emergency department CT abdomen shows 9.3X9.5X3.8cm abcess right posterior pararenal space near hepatic lobe and 5mm pulm nodules with mildly enlarged caridophrenic lymph nodes. His LFT's are ok, EKG ok. Pt is afebrile, VSS. He received 1L Bolus. Patient admitted for intra abdominal abscess. Case discussed with ID and suggested Zosyn and Vanc due to hx of Staph and E coli grown in the abscess last year. IR will drain tomorrow. Pt is ok with CPR and cardioversion but no intubation. Past Medical History:        Diagnosis Date    Cerebral artery occlusion with cerebral infarction ([address]     Hypertension        Past Surgical History:        Procedure Laterality Date    APPENDECTOMY      CHOLECYSTECTOMY      COLONOSCOPY      FRACTURE SURGERY      HERNIA REPAIR      JOINT REPLACEMENT         Home Medications: Not in a hospital admission. Allergies:  Patient has no known allergies. Social History:    reports that he has never smoked. He has never used smokeless tobacco.        Family History:   No family history on file.     Review of Systems:    General ROS: negative  Psychological ROS: negative  Hematological and Lymphatic ROS: negative  Endocrine ROS: negative  Vision:negative  ENT ROS: Denies  Respiratory ROS: no cough, shortness of breath, or wheezing  Cardiovascular ROS: no chest pain or dyspnea on exertion  Gastrointestinal ROS: diffuse abdominal pain, no N/V  Genito-Urinary ROS: no dysuria, trouble voiding, or hematuria  Musculoskeletal ROS: negative  Neurological ROS: no TIA or stroke symptoms  Dermatological ROS: negative  Weight: weight loss  Appetite: decreased      Physical Exam:    Vitals:  Patient Vitals for the past 24 hrs:   BP Temp Temp src Pulse Resp SpO2 Height Weight   11/29/21 1601 121/70 -- -- 80 16 97 % -- --   11/29/21 1525 (!) 122/90 -- -- 79 16 100 % -- --   11/29/21 1426 124/74 -- -- 77 18 100 % -- --   11/29/21 1327 116/75 -- -- 72 18 100 % -- --   11/29/21 1228 130/77 -- -- 92 18 98 % -- --   11/29/21 1123 (!) 146/90 98.3 °F (36.8 °C) Oral 93 18 97 % -- --   11/29/21 1122 -- -- -- -- -- -- 5' 8\" (1.727 m) 125 lb (56.7 kg)     Weight: Weight: 125 lb (56.7 kg)     24 hour intake/output:No intake or output data in the 24 hours ending 11/29/21 1724    General appearance - alert, ill appearing, and in no distress  Eyes - pupils equal and reactive, extraocular eye movements intact  Ears - bilateral TM's and external ear canals normal  Nose - normal and patent, no erythema, discharge or polyps  Mouth - mucous membranes moist, pharynx normal without lesions  Neck - supple, no significant adenopathy  Lymphatics - no palpable lymphadenopathy, no hepatosplenomegaly  Chest - clear to auscultation, no wheezes, rales or rhonchi, symmetric air entry  Distant Breath Sounds: No  Heart - normal rate, regular rhythm, normal S1, S2, no murmurs, rubs, clicks or gallops  Abdomen - soft, diffuse abdominal pain throughout with palpation  Obese: No; Protuberant: No  Neurological - alert, oriented, normal speech, no focal findings or movement disorder noted  Musculoskeletal - no joint tenderness, deformity or swelling  Extremities - peripheral pulses normal, no pedal edema, no clubbing or cyanosis  Skin - normal coloration and turgor, no rashes, no suspicious skin lesions noted    Review of Labs and Diagnostic Testing:    CBC:   Recent Labs     11/29/21  1204   WBC 10.4   HGB 10.9*   HCT 36.5*   MCV 86.7        BMP:   Recent Labs     11/29/21  1204   *   K 3.8      CO2 25   BUN 9   CREATININE 0.6   CALCIUM 9.1   GLUCOSE 91     PT/INR: No results for input(s): PROTIME, INR in the last 72 hours. APTT: No results for input(s): APTT in the last 72 hours. Lipids:   Recent Labs     11/29/21  1204   ALKPHOS 77   ALT <5*   AST 13   BILITOT 0.4   BILIDIR <0.2   LABALBU 3.2*   LIPASE 27.9     Troponin:   Recent Labs     11/29/21  1204   TROPONINT < 0.010        Imaging:  XR CHEST (2 VW)    Result Date: 11/29/2021  PROCEDURE: XR CHEST (2 VW) CLINICAL INFORMATION: Abdominal pain. COMPARISON: No prior study. TECHNIQUE: PA and lateral views of the chest performed. FINDINGS: Lines/tubes: None. Heart/mediastinum: The heart size is normal. The pulmonary vascularity is unremarkable. Lungs: Patchy right lower lung airspace opacities are observed. A small right pleural effusion is identified. The right hemidiaphragm is elevated. The lungs are hyperinflated. The left lung appears clear. No pneumothorax is observed. Bones: Diffuse osteopenia is present. The visualized skeletal structures appear intact. Patchy right lower lobe airspace opacity and small right pleural effusion suggest pneumonia in the appropriate clinical setting. Follow-up to ensure resolution is advised. **This report has been created using voice recognition software. It may contain minor errors which are inherent in voice recognition technology. ** Final report electronically signed by Dr Keesha Rios on 11/29/2021 1:00 PM    CT ABDOMEN PELVIS W IV CONTRAST Additional Contrast? None    Result Date: 11/29/2021  PROCEDURE: CT ABDOMEN PELVIS W IV CONTRAST CLINICAL INFORMATION: abdominal pain, h/o abdominal abscess drained 6 months ago COMPARISON: CT abdomen and pelvis 12/4/2020 TECHNIQUE: 5 mm axial imaging through the abdomen and pelvis with IV contrast.  Coronal and sagittal reconstructions were performed. All CT scans at this facility use dose modulation, iterative reconstruction, and/or weight based dosing when appropriate to reduce the radiation dose to as low as reasonably achievable. CONTRAST: 80 mL of Isovue-370 FINDINGS: A miniscule right pleural effusion is seen. There is right lung base subsegmental atelectasis. A 3 mm right middle lobe pulmonary nodule is seen (series 2, image 10). A subpleural 5 mm right middle lobe pulmonary nodule (images 16). Another subpleural 2 mm right middle lobe pulmonary nodule (image image 20). Calcified granuloma seen in the left lung base. There is a 4 mm lingular pulmonary nodule (image 21). A 4 mm subpleural left lower lobe pulmonary nodule (image 21). Platelike atelectasis is seen in the left lung base. The ascending thoracic aorta is ectatic at 4.1 cm. Coronary calcifications are noted. There is a 1.5 cm mildly enlarged lymph node in the right epicardial fat. There is a 9.3 x 9.5 x 3.8 cm collection with air bubbles within the in the right posterior pararenal space posterior to the right hepatic lobe. This is consistent with an abscess. The gallbladder is surgically absent. Splenule is present. Stool is seen within the colon. Aortoiliac calcifications. There is a 9 mm calculus in the right renal sinus. Mild right urothelial thickening. No hydronephrosis Otherwise, the liver, biliary tree, pancreas, adrenal glands, and spleen are unremarkable. No bowel obstruction or acute inflammatory bowel process. The abdominal aorta is not aneurysmal. No significantly enlarged lymph nodes are seen. The bladder is grossly unremarkable. The prostate is not enlarged. Prostatic calcifications are seen. Bones: The bones are demineralized.  Degenerative changes of the thoracolumbar spine. Minimal spondylolisthesis of L5. Left hip arthroplasty has been performed. Intramedullary ruth ann and screw fixation of the right femur is seen. 1. There is a 9.3 x 9.5 x 3.8 cm abscess in the right posterior pararenal space just posterior to the right hepatic lobe. 2. Sub-5 mm pulmonary nodules are seen. 3. 9 mm right renal sinus calculus. Mild right urothelial thickening is seen. 4. Mildly enlarged right cardiophrenic lymph node. 5. Other incidental findings as described above. **This report has been created using voice recognition software. It may contain minor errors which are inherent in voice recognition technology. ** Final report electronically signed by Dr Tatianna Tamez on 11/29/2021 1:57 PM      Assessment/Plan:    1. Intra abdominal abscess  a. ID consulted--recommended Zosyn and Vanc; ordered  b. IR to drain tomorrow; Npo at midnight  2. Pulmonary nodules seen on CT  a. Consult pulmonary  3. Weight loss and poor appetite   a. Would benefit from GI workup at discharge  b. Check CEA  4. DVT prophylaxis   a.  Lovenox     Assessment and plan of care discussed with supervising physician, Dr Shiloh Talamantes.      Electronically signed by SOLO Atkinson - CNP on 11/29/2021 at 5:24 PM         Copy: Primary Care Physician: Taye Nielson MD    Pt seen and examined by me  D/w  [facility]   Cont antibiotics   IR to drain abscess  Check CEA   Pt did not have colonoscopy     Electronically signed by Mary Menchaca MD on 11/29/2021 at 6:23 PM

## 2021-11-29 NOTE — ED NOTES
Pt and vs reassessed. RR even and unlabored. Pt resting in bed alert. covid swab collected and pt denies any needs. No distress noted.  Pt stable at this time     [facility]  11/29/21 160

## 2021-11-29 NOTE — ED NOTES
Assumed care at this time. Received bedside shift report from [facility]. Pt resting in bed alert. Pt given urinal and denies any other needs. No distress noted.  Pt stable at this time     James Nash RN  11/29/21 1662

## 2021-11-29 NOTE — ED NOTES
Pt and vs reassessed. RR even and unlabored. Pt resting in bed alert and denies any needs. No distress noted.  Pt stable at this time     [facility]  11/29/21 1528

## 2021-11-29 NOTE — ED PROVIDER NOTES
[place] ENCOUNTER          Pt Name: Emeli Camacho  MRN: [id number]  [place] 1950  Date of evaluation: 11/29/2021  Treating Resident Physician: Melisa Bernard MD  Supervising Physician: Dr. Enoch Aschoff       Chief Complaint   Patient presents with    Anorexia     History obtained from the patient. HISTORY OF PRESENT ILLNESS    HPI  Emeli Camacho is a 66 y.o. male who presents to the emergency department for evaluation of decreased appetite abdominal pain    Patient is disease had 3 months of decreased appetite and abdominal pain. Abdominal pain external exposed entire abdomen. Patient denies any nausea vomiting, shortness of breath, cough, chest pain, headache. Patient says that he has had decreased appetite even though he is able to eat he does not have any hunger cues and cannot taste anything. Patient says he has not had any recent viral illnesses denies ever having Covid or ever being tested for Covid. Patient did have a stroke several years ago that left him with some left-sided residual numbness but no weakness. Patient did not have any loss of taste at that time. Patient states that his abdominal pain at rest is 3 out of 10 and when touched goes up to an 8 or 9 out of 10. Patient states that he does not say he makes it worse or better. Patient was tried Tylenol at home with mild success. Patient requesting only Tylenol for pain relief at this time. Patient recently had an abdominal abscess that was drained behind his liver 6 months ago. Chart review shows that patient has been having unintentional weight loss for quite some time now was even documented 6 months ago. The patient has no other acute complaints at this time.           REVIEW OF SYSTEMS   Review of Systems      PAST MEDICAL AND SURGICAL HISTORY     Past Medical History:   Diagnosis Date    Cerebral artery occlusion with cerebral infarction ([facility] [place] [address]     and air entry. Abdominal:      General: Abdomen is flat. Bowel sounds are normal.      Palpations: Abdomen is rigid. Tenderness: There is generalized abdominal tenderness. There is guarding. There is no right CVA tenderness, left CVA tenderness or rebound. Musculoskeletal:         General: Normal range of motion. Skin:     General: Skin is warm and dry. Capillary Refill: Capillary refill takes less than 2 seconds. Neurological:      Mental Status: He is alert. MEDICAL DECISION MAKING   Initial Assessment:   1. Patient is a 41-year-old male presenting with decreased appetite and abdominal pain, loss of taste for 3 months. Patient's recent medical history including abdominal abscess drainage as well as shingles. Physical exam remarkable for generalized abdominal tenderness with guarding. No other abnormal findings on physical exam.  Concern right now is that the abdominal abscess has recurred and that is what is causing his abdominal pain and loss of appetite. Other causes abdominal pain include pancreatitis, cholecystitis/cholangitis, pneumonia, ACS.   Plan:    Troponin, BMP, BNP, CBC, hepatic function panel, lipase, EKG, chest x-ray, CT abdomen pelvis with contrast   Tylenol, fluids        ED RESULTS   Laboratory results:  Labs Reviewed   CBC WITH AUTO DIFFERENTIAL - Abnormal; Notable for the following components:       Result Value    RBC 4.21 (*)     Hemoglobin 10.9 (*)     Hematocrit 36.5 (*)     MCH 25.9 (*)     MCHC 29.9 (*)     RDW-CV 16.3 (*)     RDW-SD 51.2 (*)     MPV 8.4 (*)     Segs Absolute 8.1 (*)     All other components within normal limits   BASIC METABOLIC PANEL W/ REFLEX TO MG FOR LOW K - Abnormal; Notable for the following components:    Sodium 134 (*)     All other components within normal limits   HEPATIC FUNCTION PANEL - Abnormal; Notable for the following components:    Albumin 3.2 (*)     ALT <5 (*)     All other components within normal limits   BRAIN NATRIURETIC PEPTIDE - Abnormal; Notable for the following components:    Pro-BNP 1090.0 (*)     All other components within normal limits   COVID-19, RAPID   LIPASE   TROPONIN   ANION GAP   GLOMERULAR FILTRATION RATE, ESTIMATED       Radiologic studies results:  CT ABDOMEN PELVIS W IV CONTRAST Additional Contrast? None   Final Result   1. There is a 9.3 x 9.5 x 3.8 cm abscess in the right posterior pararenal space just posterior to the right hepatic lobe. 2. Sub-5 mm pulmonary nodules are seen. 3. 9 mm right renal sinus calculus. Mild right urothelial thickening is seen. 4. Mildly enlarged right cardiophrenic lymph node. 5. Other incidental findings as described above. **This report has been created using voice recognition software. It may contain minor errors which are inherent in voice recognition technology. **      Final report electronically signed by Dr Rowena Bailey on 11/29/2021 1:57 PM      XR CHEST (2 VW)   Final Result   Patchy right lower lobe airspace opacity and small right pleural effusion suggest pneumonia in the appropriate clinical setting. Follow-up to ensure resolution is advised. **This report has been created using voice recognition software. It may contain minor errors which are inherent in voice recognition technology. **      Final report electronically signed by Dr Armani Buenrostro on 11/29/2021 1:00 PM          ED Medications administered this visit:   Medications   acetaminophen (TYLENOL) tablet 650 mg (650 mg Oral Given 11/29/21 1216)   0.9 % sodium chloride bolus (0 mLs IntraVENous Stopped 11/29/21 1329)   iopamidol (ISOVUE-370) 76 % injection 80 mL (80 mLs IntraVENous Given 11/29/21 1327)   acetaminophen (TYLENOL) tablet 650 mg (650 mg Oral Given 11/29/21 1436)         ED COURSE     ED Course as of 11/29/21 1645   Mon Nov 29, 2021   6171 Basic Metabolic Panel w/ Reflex to MG(!):    Sodium 134(!)   Potassium 3.8   Chloride 100   CO2 25   Glucose 91   BUN 9 Creatinine 0.6   Calcium 9.1 [ILYA]   1256 Hepatic Function Panel(!):    Albumin 3.2(!)   Bilirubin 0.4   Bilirubin, Direct <0.2   Alk Phos 77   AST 13   ALT <5(!)   Total Protein 7.2 [ILYA]   1256 CBC Auto Differential(!):    WBC 10.4   RBC 4.21(!)   Hemoglobin Quant 10. 9(!)   Hematocrit 36.5(!)   MCV 86.7   MCH 25.9(!)   MCHC 29.9(!)   RDW-CV 16.3(!)   RDW-SD 51.2(!)   Platelet Count 742   MPV 8.4(!)   Seg Neutrophils 77.8   Lymphocytes 13.7   Monocytes 7.4   Eosinophils 0.1   Basophils 0.4   Immature Granulocytes 0.6   Segs Absolute 8.1(!)   Lymphocytes Absolute 1.4   Monocytes Absolute 0.8   Eosinophils Absolute 0.0   Basophils Absolute 0.0   Immature Grans (Abs) 0.06   Nucleated Red Blood Cells 0 [ILYA]   1315 Brain Natriuretic Peptide(!):    Pro-BNP 1090.0(!) [ILYA]   1315 Troponin:    Troponin T < 0.010 [ILYA]   1406 CT ABDOMEN PELVIS W IV CONTRAST Additional Contrast? None  CT scan demonstrates a 9 x 9 x 4 abscess in the pararenal space posterior right hepatic lobe consistent with abscess as well as multiple pulmonary nodules. There is also a 9 mm renal stone with mild urothelial thickening [ILYA]   9380 surgery was contacted for consult regarding this patient. They recommended IR drainage with hospital admit. [ILYA]   [id number] Hospitalist was contacted for admission. [ILYA]   3287 Patient was updated on status. [ILYA]   1645 COVID-19, Rapid:    SARS-CoV-2, NAAT NOT  DETECTED [ILYA]      ED Course User Index  [ILYA] Rhonda Devries MD       Strict return precautions and follow up instructions were discussed with the patient prior to discharge, with which the patient agrees. MEDICATION CHANGES     New Prescriptions    No medications on file         FINAL DISPOSITION     Final diagnoses:   Abscess of abdominal cavity (HCC)   Lung nodule   Kidney stone     Condition: condition: stable  Dispo: Admit to med/surg floor      This transcription was electronically signed.  Parts of this transcriptions may have been dictated by use of voice recognition software and electronically transcribed, and parts may have been transcribed with the assistance of an ED scribe. The transcription may contain errors not detected in proofreading. Please refer to my supervising physician's documentation if my documentation differs.     Electronically Signed: Kellie Mendes MD, 11/29/21, 4:45 PM         Yelena Smyth MD  Resident  11/29/21 9480

## 2021-11-30 ENCOUNTER — APPOINTMENT (OUTPATIENT)
Dept: CT IMAGING | Age: 71
DRG: 441 | End: 2021-11-30
Payer: COMMERCIAL

## 2021-11-30 LAB
ANION GAP SERPL CALCULATED.3IONS-SCNC: 12 MEQ/L (ref 8–16)
BASOPHILS # BLD: 0.3 %
BASOPHILS ABSOLUTE: 0 THOU/MM3 (ref 0–0.1)
BUN BLDV-MCNC: 9 MG/DL (ref 7–22)
CALCIUM SERPL-MCNC: 8.6 MG/DL (ref 8.5–10.5)
CHLORIDE BLD-SCNC: 102 MEQ/L (ref 98–111)
CO2: 22 MEQ/L (ref 23–33)
CREAT SERPL-MCNC: 0.6 MG/DL (ref 0.4–1.2)
EOSINOPHIL # BLD: 0.2 %
EOSINOPHILS ABSOLUTE: 0 THOU/MM3 (ref 0–0.4)
ERYTHROCYTE [DISTWIDTH] IN BLOOD BY AUTOMATED COUNT: 16.4 % (ref 11.5–14.5)
ERYTHROCYTE [DISTWIDTH] IN BLOOD BY AUTOMATED COUNT: 51.2 FL (ref 35–45)
GFR SERPL CREATININE-BSD FRML MDRD: > 90 ML/MIN/1.73M2
GLUCOSE BLD-MCNC: 88 MG/DL (ref 70–108)
HCT VFR BLD CALC: 32.7 % (ref 42–52)
HEMOGLOBIN: 9.7 GM/DL (ref 14–18)
IMMATURE GRANS (ABS): 0.03 THOU/MM3 (ref 0–0.07)
IMMATURE GRANULOCYTES: 0.3 %
LV EF: 55 %
LVEF MODALITY: NORMAL
LYMPHOCYTES # BLD: 13.2 %
LYMPHOCYTES ABSOLUTE: 1.2 THOU/MM3 (ref 1–4.8)
MCH RBC QN AUTO: 25.5 PG (ref 26–33)
MCHC RBC AUTO-ENTMCNC: 29.7 GM/DL (ref 32.2–35.5)
MCV RBC AUTO: 85.8 FL (ref 80–94)
MONOCYTES # BLD: 7.3 %
MONOCYTES ABSOLUTE: 0.6 THOU/MM3 (ref 0.4–1.3)
NUCLEATED RED BLOOD CELLS: 0 /100 WBC
OSMOLALITY CALCULATION: 270.1 MOSMOL/KG (ref 275–300)
PLATELET # BLD: 221 THOU/MM3 (ref 130–400)
PMV BLD AUTO: 8.6 FL (ref 9.4–12.4)
POTASSIUM REFLEX MAGNESIUM: 4 MEQ/L (ref 3.5–5.2)
RBC # BLD: 3.81 MILL/MM3 (ref 4.7–6.1)
SEG NEUTROPHILS: 78.7 %
SEGMENTED NEUTROPHILS ABSOLUTE COUNT: 6.9 THOU/MM3 (ref 1.8–7.7)
SODIUM BLD-SCNC: 136 MEQ/L (ref 135–145)
WBC # BLD: 8.8 THOU/MM3 (ref 4.8–10.8)

## 2021-11-30 PROCEDURE — 87070 CULTURE OTHR SPECIMN AEROBIC: CPT

## 2021-11-30 PROCEDURE — 6370000000 HC RX 637 (ALT 250 FOR IP): Performed by: REGISTERED NURSE

## 2021-11-30 PROCEDURE — 6360000004 HC RX CONTRAST MEDICATION: Performed by: STUDENT IN AN ORGANIZED HEALTH CARE EDUCATION/TRAINING PROGRAM

## 2021-11-30 PROCEDURE — 87186 SC STD MICRODIL/AGAR DIL: CPT

## 2021-11-30 PROCEDURE — 80048 BASIC METABOLIC PNL TOTAL CA: CPT

## 2021-11-30 PROCEDURE — 77012 CT SCAN FOR NEEDLE BIOPSY: CPT

## 2021-11-30 PROCEDURE — 2580000003 HC RX 258: Performed by: INTERNAL MEDICINE

## 2021-11-30 PROCEDURE — 87077 CULTURE AEROBIC IDENTIFY: CPT

## 2021-11-30 PROCEDURE — 88305 TISSUE EXAM BY PATHOLOGIST: CPT

## 2021-11-30 PROCEDURE — 85025 COMPLETE CBC W/AUTO DIFF WBC: CPT

## 2021-11-30 PROCEDURE — 87075 CULTR BACTERIA EXCEPT BLOOD: CPT

## 2021-11-30 PROCEDURE — 1200000003 HC TELEMETRY R&B

## 2021-11-30 PROCEDURE — 99222 1ST HOSP IP/OBS MODERATE 55: CPT | Performed by: INTERNAL MEDICINE

## 2021-11-30 PROCEDURE — 6360000002 HC RX W HCPCS: Performed by: PHARMACIST

## 2021-11-30 PROCEDURE — 6360000002 HC RX W HCPCS: Performed by: INTERNAL MEDICINE

## 2021-11-30 PROCEDURE — 2580000003 HC RX 258: Performed by: PHARMACIST

## 2021-11-30 PROCEDURE — 71260 CT THORAX DX C+: CPT

## 2021-11-30 PROCEDURE — 6360000002 HC RX W HCPCS: Performed by: REGISTERED NURSE

## 2021-11-30 PROCEDURE — 88112 CYTOPATH CELL ENHANCE TECH: CPT

## 2021-11-30 PROCEDURE — 87205 SMEAR GRAM STAIN: CPT

## 2021-11-30 PROCEDURE — 36415 COLL VENOUS BLD VENIPUNCTURE: CPT

## 2021-11-30 PROCEDURE — 6370000000 HC RX 637 (ALT 250 FOR IP): Performed by: RADIOLOGY

## 2021-11-30 PROCEDURE — 2580000003 HC RX 258: Performed by: REGISTERED NURSE

## 2021-11-30 PROCEDURE — 93306 TTE W/DOPPLER COMPLETE: CPT

## 2021-11-30 PROCEDURE — 10160 PNXR ASPIR ABSC HMTMA BULLA: CPT

## 2021-11-30 RX ORDER — BACITRACIN, NEOMYCIN, POLYMYXIN B 400; 3.5; 5 [USP'U]/G; MG/G; [USP'U]/G
OINTMENT TOPICAL ONCE
Status: COMPLETED | OUTPATIENT
Start: 2021-11-30 | End: 2021-11-30

## 2021-11-30 RX ADMIN — PIPERACILLIN AND TAZOBACTAM 3375 MG: 3; .375 INJECTION, POWDER, LYOPHILIZED, FOR SOLUTION INTRAVENOUS at 02:44

## 2021-11-30 RX ADMIN — PIPERACILLIN AND TAZOBACTAM 3375 MG: 3; .375 INJECTION, POWDER, LYOPHILIZED, FOR SOLUTION INTRAVENOUS at 11:14

## 2021-11-30 RX ADMIN — PIPERACILLIN AND TAZOBACTAM 3375 MG: 3; .375 INJECTION, POWDER, LYOPHILIZED, FOR SOLUTION INTRAVENOUS at 18:28

## 2021-11-30 RX ADMIN — VANCOMYCIN HYDROCHLORIDE 750 MG: 1 INJECTION, POWDER, LYOPHILIZED, FOR SOLUTION INTRAVENOUS at 22:42

## 2021-11-30 RX ADMIN — SODIUM CHLORIDE, PRESERVATIVE FREE 10 ML: 5 INJECTION INTRAVENOUS at 02:45

## 2021-11-30 RX ADMIN — SODIUM CHLORIDE, PRESERVATIVE FREE 10 ML: 5 INJECTION INTRAVENOUS at 09:41

## 2021-11-30 RX ADMIN — IOPAMIDOL 80 ML: 755 INJECTION, SOLUTION INTRAVENOUS at 14:44

## 2021-11-30 RX ADMIN — SODIUM CHLORIDE 25 ML: 9 INJECTION, SOLUTION INTRAVENOUS at 02:43

## 2021-11-30 RX ADMIN — ENOXAPARIN SODIUM 40 MG: 100 INJECTION SUBCUTANEOUS at 22:40

## 2021-11-30 RX ADMIN — ACETAMINOPHEN 650 MG: 325 TABLET ORAL at 05:02

## 2021-11-30 RX ADMIN — ACETAMINOPHEN 650 MG: 325 TABLET ORAL at 17:28

## 2021-11-30 RX ADMIN — BACITRACIN, NEOMYCIN, POLYMYXIN B 1 G: 400; 3.5; 5 OINTMENT TOPICAL at 14:34

## 2021-11-30 RX ADMIN — VANCOMYCIN HYDROCHLORIDE 750 MG: 1 INJECTION, POWDER, LYOPHILIZED, FOR SOLUTION INTRAVENOUS at 09:42

## 2021-11-30 RX ADMIN — ACETAMINOPHEN 650 MG: 325 TABLET ORAL at 11:36

## 2021-11-30 ASSESSMENT — PAIN DESCRIPTION - PROGRESSION
CLINICAL_PROGRESSION: NOT CHANGED

## 2021-11-30 ASSESSMENT — PAIN DESCRIPTION - LOCATION
LOCATION: ABDOMEN;FLANK
LOCATION: BACK
LOCATION: ABDOMEN

## 2021-11-30 ASSESSMENT — PAIN SCALES - GENERAL
PAINLEVEL_OUTOF10: 3
PAINLEVEL_OUTOF10: 4
PAINLEVEL_OUTOF10: 2
PAINLEVEL_OUTOF10: 4
PAINLEVEL_OUTOF10: 5
PAINLEVEL_OUTOF10: 3

## 2021-11-30 ASSESSMENT — ENCOUNTER SYMPTOMS
CONSTIPATION: 0
ABDOMINAL PAIN: 1
SHORTNESS OF BREATH: 0
DIARRHEA: 0
BACK PAIN: 1
COUGH: 0
VOMITING: 0
SORE THROAT: 0
NAUSEA: 0

## 2021-11-30 ASSESSMENT — PAIN DESCRIPTION - ORIENTATION
ORIENTATION: LOWER
ORIENTATION: ANTERIOR
ORIENTATION: LOWER

## 2021-11-30 ASSESSMENT — PAIN DESCRIPTION - PAIN TYPE
TYPE: CHRONIC PAIN
TYPE: ACUTE PAIN
TYPE: ACUTE PAIN

## 2021-11-30 ASSESSMENT — PAIN - FUNCTIONAL ASSESSMENT
PAIN_FUNCTIONAL_ASSESSMENT: PREVENTS OR INTERFERES WITH MANY ACTIVE NOT PASSIVE ACTIVITIES
PAIN_FUNCTIONAL_ASSESSMENT: PREVENTS OR INTERFERES WITH MANY ACTIVE NOT PASSIVE ACTIVITIES
PAIN_FUNCTIONAL_ASSESSMENT: PREVENTS OR INTERFERES SOME ACTIVE ACTIVITIES AND ADLS

## 2021-11-30 ASSESSMENT — PAIN DESCRIPTION - FREQUENCY
FREQUENCY: CONTINUOUS

## 2021-11-30 ASSESSMENT — PAIN DESCRIPTION - ONSET
ONSET: ON-GOING

## 2021-11-30 ASSESSMENT — PAIN DESCRIPTION - DESCRIPTORS
DESCRIPTORS: ACHING
DESCRIPTORS: BURNING
DESCRIPTORS: ACHING

## 2021-11-30 NOTE — CONSULTS
[facility]      Patient - Farzaneh Shabazz   MRN -  [id number]   [facility]   - 1950      Date of Admission -  2021 11:25 AM  Date of evaluation -  2021  Room - -/003-A   Hospital Day - [facility] Ana Hong MD Primary Care Physician - Amber James MD     Problem List      Active Hospital Problems    Diagnosis Date Noted    Intra-abdominal abscess [facility]) [K65.1] 2020     Reason for Consult    Lung nodules   HPI   History Obtained From: Patient and electronic medical record. Farzaneh Shabazz is a 66 y.o. male with no significant pmhx who presents with abdominal pain over the past few months with lack of appetite. He had similar symptoms this time last year and was tx for abdominal abscess--drained by IR. He did have unintentional weight loss at that time also, which has continued this year. He reports losing 30 lbs in the past 3-4 months. He denies any chest pain, SOB, nausea, vomiting, diarrhea. His only complaint is diffuse abdominal pain and poor intake. In the emergency department CT abdomen shows 9.3X9.5X3.8cm abcess right posterior pararenal space near hepatic lobe and 5mm pulm nodules with mildly enlarged caridophrenic lymph nodes. His LFT's are ok, EKG ok. Pt is afebrile, VSS. Patient on Zosyn and Vanc due to hx of Staph and E coli grown in the abscess last year. IR to drain abscess. He is having shortness of breath: No  He denies orthopnea. He denies paroxysmal nocturnal dyspnea.       He is having cough: No    He is having chest pain:No    PMHx   Past Medical History      Diagnosis Date    Cerebral artery occlusion with cerebral infarction ([facility] [address]     Hypertension       Past Surgical History        Procedure Laterality Date    APPENDECTOMY      CHOLECYSTECTOMY      COLONOSCOPY      FRACTURE SURGERY      HERNIA REPAIR      JOINT REPLACEMENT       Meds    Current Medications    vancomycin (VANCOCIN) intermittent dosing (placeholder)   Other RX Placeholder    vancomycin  750 mg IntraVENous Q12H    piperacillin-tazobactam  3,375 mg IntraVENous Q8H    sodium chloride flush  5-40 mL IntraVENous 2 times per day    enoxaparin  40 mg SubCUTAneous Daily     sodium chloride flush, sodium chloride, acetaminophen **OR** acetaminophen, promethazine **OR** ondansetron  IV Drips/Infusions   sodium chloride 25 mL (11/30/21 2005)     Home Medications  No medications prior to admission. Diet    Diet NPO  Allergies    Patient has no known allergies. Social History     Social History     Socioeconomic History    Marital status:      Spouse name: Not on file    Number of children: Not on file    Years of education: Not on file    Highest education level: Not on file   Occupational History    Not on file   Tobacco Use    Smoking status: Never Smoker    Smokeless tobacco: Never Used   Substance and Sexual Activity    Alcohol use: Not on file    Drug use: Not on file    Sexual activity: Not on file   Other Topics Concern    Not on file   Social History Narrative    Not on file     Social Determinants of Health     Financial Resource Strain:     Difficulty of Paying Living Expenses: Not on file   Food Insecurity:     Worried About Running Out of Food in the Last Year: Not on file    Arielle of Food in the Last Year: Not on file   Transportation Needs:     Lack of Transportation (Medical): Not on file    Lack of Transportation (Non-Medical):  Not on file   Physical Activity:     Days of Exercise per Week: Not on file    Minutes of Exercise per Session: Not on file   Stress:     Feeling of Stress : Not on file   Social Connections:     Frequency of Communication with Friends and Family: Not on file    Frequency of Social Gatherings with Friends and Family: Not on file    Attends Yarsani Services: Not on file    Active Member of Clubs or Organizations: Not on file    Attends Club or Organization Meetings: Not on file    Marital Status: Not on file   Intimate Partner Violence:     Fear of Current or Ex-Partner: Not on file    Emotionally Abused: Not on file    Physically Abused: Not on file    Sexually Abused: Not on file   Housing Stability:     Unable to Pay for Housing in the Last Year: Not on file    Number of [place] in the Last Year: Not on file    Unstable Housing in the Last Year: Not on file     Family History    No family history on file. Sleep History    Never diagnosed with sleep apnea in the past.  Occupational history   Occupation:  He is current working: No  Type of profession: unemployed, disabled. History of tobacco smoking:No    History of recreational or IV drug use in the past:NO     History of exposure to coal mines/coal dust: NO  History of exposure to foundry dust/welding: NO  History of exposure to quarry/silica/sandblasting: NO  History of exposure to asbestos/working with breaks/ships: NO  History of exposure to farm dust: NO  History of recent travel to long distances: NO  History of exposure to birds, pigeons, or chickens in the past:NO    History of pulmonary embolism in the past: No            History of DVT in the past:No            Review of systems   Review of Systems   Constitutional: Positive for appetite change, chills and unexpected weight change. Negative for activity change, diaphoresis, fatigue and fever. HENT: Negative for sneezing and sore throat. Respiratory: Negative for cough and shortness of breath. Cardiovascular: Negative for chest pain, palpitations and leg swelling. Gastrointestinal: Positive for abdominal pain. Negative for constipation, diarrhea, nausea and vomiting. Endocrine: Negative for polydipsia and polyuria. Genitourinary: Positive for flank pain. Negative for dysuria, frequency, hematuria and urgency. Musculoskeletal: Positive for back pain. Negative for myalgias.    Neurological: Negative for dizziness, syncope, weakness, light-headedness and headaches. All other systems reviewed and are negative. Vitals     height is 5' 8\" (1.727 m) and weight is 125 lb (56.7 kg). His oral temperature is 98.6 °F (37 °C). His blood pressure is 112/70 and his pulse is 76. His respiration is 16 and oxygen saturation is 98%. Body mass index is 19.01 kg/m². SUPPLEMENTAL O2:       I/O        Intake/Output Summary (Last 24 hours) at 11/30/2021 1000  Last data filed at 11/30/2021 0941  Gross per 24 hour   Intake 10 ml   Output --   Net 10 ml     No intake/output data recorded. Patient Vitals for the past 96 hrs (Last 3 readings):   Weight   11/29/21 1122 125 lb (56.7 kg)       Exam   Nursing note and vitals reviewed. Exam:  /70   Pulse 76   Temp 98.6 °F (37 °C) (Oral)   Resp 16   Ht 5' 8\" (1.727 m)   Wt 125 lb (56.7 kg)   SpO2 98%   BMI 19.01 kg/m²   General appearance: No apparent distress, appears stated age and cooperative. HEENT: Pupils equal, round, and reactive to light. Conjunctivae/corneas clear. Neck: Supple, with full range of motion. No jugular venous distention. Trachea midline. Respiratory:  Normal respiratory effort. Clear to auscultation, bilaterally without Rales/Wheezes/Rhonchi. Cardiovascular: Regular rate and rhythm with normal S1/S2 without murmurs, rubs or gallops. Abdomen: Soft, tender to palpation in RLQ, non-distended with normal bowel sounds. Musculoskeletal: passive and active ROM x 4 extremities. Skin: Skin color, texture, turgor normal.  No rashes or lesions. Nodule on right back, shingles on right lower back. Neurologic:  Neurovascularly intact without any focal sensory/motor deficits.  Cranial nerves: II-XII intact, grossly non-focal.  Psychiatric: Alert and oriented, thought content appropriate, normal insight  Capillary Refill: Brisk,< 3 seconds   Peripheral Pulses: +2 palpable, equal bilaterally       Labs  - Old records and notes have been reviewed in [facility]   ABG  No results found for: PH, PO2, PCO2, HCO3, O2SAT  No results found for: IFIO2, MODE, SETTIDVOL, SETPEEP  CBC  Recent Labs     11/29/21  1204 11/30/21  0611   WBC 10.4 8.8   RBC 4.21* 3.81*   HGB 10.9* 9.7*   HCT 36.5* 32.7*   MCV 86.7 85.8   MCH 25.9* 25.5*   MCHC 29.9* 29.7*    221   MPV 8.4* 8.6*      BMP  Recent Labs     11/29/21  1204 11/30/21  0611   * 136   K 3.8 4.0    102   CO2 25 22*   BUN 9 9   CREATININE 0.6 0.6   GLUCOSE 91 88   CALCIUM 9.1 8.6     LFT  Recent Labs     11/29/21  1204   AST 13   ALT <5*   BILITOT 0.4   ALKPHOS 77   LIPASE 27.9     TROP  Lab Results   Component Value Date    TROPONINT < 0.010 11/29/2021     BNP  No results for input(s): BNP in the last 72 hours. Lactic Acid  No results for input(s): LACTA in the last 72 hours. INR  No results for input(s): INR, PROTIME in the last 72 hours. PTT  No results for input(s): APTT in the last 72 hours. Glucose  No results for input(s): POCGLU in the last 72 hours. UA No results for input(s): SPECGRAV, PHUR, COLORU, CLARITYU, MUCUS, PROTEINU, BLOODU, RBCUA, WBCUA, BACTERIA, NITRU, GLUCOSEU, BILIRUBINUR, UROBILINOGEN, KETUA, LABCAST, LABCASTTY, AMORPHOS in the last 72 hours. Invalid input(s): CRYSTALS. PFTs   None found     Sleep studies   None found     Cultures    Blood cultures x2: no preliminary growth   COVID 19 negative     EKG     Echocardiogram   Pending     Radiology    CXR  2VW CXR 11/29/21:  Patchy right lower lobe airspace opacity and small right pleural effusion suggest pneumonia in the appropriate clinical setting. Follow-up to ensure resolution is advised. CT Scans  (See actual reports for details)    CT abdomen and pelvis with IV contrast 11/29/21:  A miniscule right pleural effusion is seen. There is right lung base subsegmental atelectasis. A 3 mm right middle lobe pulmonary nodule is seen (series 2, image 10). A subpleural 5 mm right middle lobe pulmonary nodule (images 16). Another subpleural 2   mm right middle lobe pulmonary nodule (image image 20). Calcified granuloma seen in the left lung base. There is a 4 mm lingular pulmonary nodule (image 21). A 4 mm subpleural left lower lobe pulmonary nodule (image 21). Platelike atelectasis is seen in   the left lung base. The ascending thoracic aorta is ectatic at 4.1 cm. Coronary calcifications are noted. There is a 1.5 cm mildly enlarged lymph node in the right epicardial fat. There is a 9.3 x 9.5 x 3.8 cm collection with air bubbles within the in the right posterior pararenal space posterior to the right hepatic lobe. This is consistent with an abscess. The gallbladder is surgically absent. Splenule is present. Stool is seen   within the colon. Aortoiliac calcifications. There is a 9 mm calculus in the right renal sinus. Mild right urothelial thickening. No hydronephrosis  No significantly enlarged lymph nodes are seen. The bladder is grossly unremarkable. The prostate is not enlarged. Prostatic calcifications are seen. Bones: The bones are demineralized. Degenerative changes of the thoracolumbar spine. Minimal spondylolisthesis of L5. Left hip arthroplasty has been performed.  Intramedullary ruth ann and screw fixation of the right femur is seen.         Assessment   -Multiple pulmonary nodules including: 3 mm right middle lobe pulmonary nodule, subpleural 5 mm right middle lobe pulmonary nodule, subpleural 2mm right middle lobe pulmonary nodule, 4 mm lingular pulmonary nodule, 4 mm subpleural left lower lobe pulmonary nodule  -1.5 cm mildly enlarged lymph node in the right epicardial fat   -Calcified granuloma in left lung base   -Atelectasis of left lung base   -Small right pleural effusion   -Retroperitoneal abscess   -History of TB exposure   -Hx of CVA  -Essential HTN  -Hx of shingles on his back  -Unintentional weight loss   Plan   CT chest with contrast to evaluate lung nodules   Follow pending blood cultures   Complete 2D ECHO to

## 2021-11-30 NOTE — PROGRESS NOTES
1350 Pt arrived to CT holding area for Abscess drain(aspiration). 1351 Procedure explained using teach back method. Pt states understanding. 1400 Pt needs a chset CT prior to the abscess drain. 36 Dr. Bill Garcia into assess patient and explain procedure. 1409 This procedure has been fully reviewed with the patient and written informed consent has been obtained. 1410 Patient assisted to table in left side down and right side up position. Monitor attached to patient. Comfort ensured. 1416 Pre-procedure images obtained. 1419 Procedure begins. 1420 Lidocaine given. 1432 Procedure complete. 180mL Samples obtained. 1434 Post-procedure images obtained. 1440 Patient assisted to bed in semi fowlers position. Comfort ensured. 1441 Patient transported to [address] [place] [address] in stable condition.

## 2021-11-30 NOTE — CARE COORDINATION
11/30/21, 10:20 AM EST  DISCHARGE PLANNING EVALUATION:    Murtaza Mtz       Admitted: 11/29/2021/ [address] day: 1   Location: -03/003-A Reason for admit: Kidney stone [N20.0]  Lung nodule [R91.1]  Intra-abdominal abscess (HCC) [K65.1]  Abscess of abdominal cavity (HCC) [K65.1]   PMH:  has a past medical history of Cerebral artery occlusion with cerebral infarction ([address] and Hypertension. Procedure: 11/30/21 Scheduled IR for abscess drainage. Barriers to Discharge: ID consult for abdominal abscess, recurrent. IV antibiotics. Pulmonary consult regarding new lung nodule. NPO. I&O. Pain management. PCP: Morales Glynn MD  Readmission Risk Score: 8.1 ( )%    Patient Goals/Plan/Treatment Preferences: I spoke with Emanuel Teixeira and his wife. Planning home with wife at discharge. [facility]. No discharge needs voiced at this time. Will follow. Transportation/Food Security/Housekeeping Addressed:  No issues identified.

## 2021-11-30 NOTE — CONSULTS
[address] [place] [address]                                  CONSULTATION    PATIENT NAME: Nick Franklin                        :        1950  MED REC NO:   [id number]                           ROOM:       0003  ACCOUNT NO:   [de-identified]                           ADMIT DATE: 2021  PROVIDER:     Constantino Rivera. MAHENDRA Mcneill Harps:  2021    REASON FOR CONSULTATION:  Abscess in the right posterior pararenal  space, posterior to the right hepatic lobe. HISTORY OF PRESENT ILLNESS:  He is a 64-year-old male patient known to  me from his past hospitalization. Last year exactly same day, he was  evaluated with similar complaints. He was found to have an abscess that  required drainage. The patient was advised to have colonoscopy to make  sure there was not any intraabdominal cause for the abscess. Unfortunately, patient did not have his colonoscopy and he has not been  feeling very well for the last three months with poor oral intake,  weight loss and epigastric pain. His CT scan was as noted above. He  denied any fever or night sweats. He has not had any diarrhea. No  blood in the stool. He does not have any cough or chest pain. PAST MEDICAL HISTORY:  He has history of hypertension, history of  zoster, and history of stroke. PAST SURGICAL HISTORY:  Includes appendectomy, cholecystectomy, hernia  repair, joint replacement. CURRENT MEDICATIONS:  Include Tylenol, Lovenox, IV Zosyn, vancomycin,  Phenergan. REVIEW OF SYSTEM:  As noted in the HPI. PHYSICAL EXAMINATION:  VITAL SIGNS:  Temperature was 98.6, respirations 16, pulse 76, blood  pressure 112/70. HEENT:  Has slightly pale conjunctivae. Anicteric sclerae. CHEST:  Bilateral air entry. CARDIOVASCULAR SYSTEM:  Regular. ABDOMEN:  Soft. EXTREMITIES:  No cyanosis or clubbing.   CNS:  He is awake and oriented to person, place, and time.    DIAGNOSTICS:  Sodium of 136, potassium 3.8, chloride 101, bicarb 24, BUN  8, creatinine 0.8, ALT 6, AST 18, WBC 8.8, hemoglobin 9.7, hematocrit  32.7. CT scan report as noted above. IMPRESSION:  He is a 75-year-old male patient admitted with poor oral  intake, weight loss, has abscess. He will have IR drain the abscess. He has pulmonary nodule. Discussed with Pulmonary. He will have a CT  scan to evaluate. Weight loss for appetite likely from above. I  advised for GI work to make sure he does not have any source for the  recurrent abscess. Continue current treatment. Based on culture  report, will deescalate antibiotic.         Hayder Zamarripa M.D.    D: 11/30/2021 10:06:40       T: 11/30/2021 11:14:17     [email or web address]  Job#: [id number]     Doc#: [id number]    CC:

## 2021-11-30 NOTE — PROGRESS NOTES
Progress note      [facility]             Patient:  Brooks Britton  YOB: 1950    MRN: [id number]   Acct:  [de-identified]   7K-03/003-A  Primary Care Physician: Teodora Canas MD    Admit Date: 11/29/2021           Subjective: Pt still with abdominal pain, no worse. He states that he is getting inpatient about waiting for IR. No fevers.         Objective:      Physical Exam:    Vitals:Patient Vitals for the past 24 hrs:   BP Temp Temp src Pulse Resp SpO2 Height   11/30/21 1130 128/63 97.8 °F (36.6 °C) Oral 72 16 96 % --   11/30/21 0800 112/70 98.6 °F (37 °C) Oral 76 16 98 % --   11/30/21 0230 113/66 97.8 °F (36.6 °C) Oral 72 16 99 % --   11/29/21 2146 111/66 98.2 °F (36.8 °C) Oral 65 18 98 % 5' 8\" (1.727 m)   11/29/21 2017 128/72 -- -- 72 18 100 % --   11/29/21 1959 123/71 -- -- 80 18 98 % --   11/29/21 1900 122/71 -- -- 76 18 98 % --   11/29/21 1759 104/63 -- -- 80 16 97 % --   11/29/21 1700 105/62 -- -- 79 16 100 % --   11/29/21 1601 121/70 -- -- 80 16 97 % --   11/29/21 1525 (!) 122/90 -- -- 79 16 100 % --   11/29/21 1426 124/74 -- -- 77 18 100 % --   11/29/21 1327 116/75 -- -- 72 18 100 % --     Weight: Weight: 125 lb (56.7 kg)     24 hour intake/output:    Intake/Output Summary (Last 24 hours) at 11/30/2021 1311  Last data filed at 11/30/2021 0941  Gross per 24 hour   Intake 10 ml   Output --   Net 10 ml       General appearance - alert, well appearing, and in no distress  Eyes - pupils equal and reactive, extraocular eye movements intact  Mouth - mucous membranes moist, pharynx normal without lesions  Neck - supple, no significant adenopathy  Chest - clear to auscultation, no wheezes, rales or rhonchi, symmetric air entry  Heart - normal rate, regular rhythm, normal S1, S2, no murmurs, rubs, clicks or gallops  Abdomen - soft, tender to palpation especially RLQ, nondistended, no masses or organomegaly, pos discharge   -CEA WNL    4. DVT prophylaxis   -Lovenox       Assessment and plan of care discussed with supervising physician, Dr Alicia Haro. Electronically signed by SOLO Hopkins - CNP on 11/30/2021 at 1:11 PM    Addendum/attestation by Mari Cavazos MD:  I have seen and examined the patient independently. Face to face evaluation and examination was performed. The above evaluation and note has been reviewed. Laboratory and radiological data were reviewed. I Have discussed with Trudy Groves CNP about this patient in detail. The above assessment and plan has been reviewed. Please see my modifications mentioned below.       My modifications:  Electronically signed by Nish Collins MD on 11/30/2021 at 3:28 PM

## 2021-11-30 NOTE — ED NOTES
Pt and vs reassessed. RR even and unlabored. Pt medicated per MAR and resting in bed alert. Pt denies any needs. No distress noted.  Pt stable at this time     [facility] Rivera[facility]  11/29/21 2020

## 2021-11-30 NOTE — PROGRESS NOTES
[facility] Pharmacokinetic Monitoring Service - Vancomycin     Wilda Forrester is a 66 y.o. male starting on vancomycin therapy for intra-abdominal infection. Pharmacy consulted by Lauro Wyatt for monitoring and adjustment. Target Concentration: Goal AUC/MCKENZIE < 500    Additional Antimicrobials: Zosyn    Pertinent Laboratory Values: Wt Readings from Last 1 Encounters:   11/29/21 125 lb (56.7 kg)     Temp Readings from Last 1 Encounters:   11/29/21 98.2 °F (36.8 °C) (Oral)     No results found for: PROCAL  Estimated Creatinine Clearance: 88 mL/min (based on SCr of 0.6 mg/dL). Recent Labs     11/29/21  1204   CREATININE 0.6   WBC 10.4       Pertinent Cultures:  Culture Date Source Results   11/29/21 Blood x 2 pending   MRSA Nasal Swab: N/A. Non-respiratory infection. .    Plan:  Dosing recommendations based on Bayesian software  Patient received vancomycin 1000mg in ED on 11/29/21 at 2020.   Start vancomycin 750mg IV Q12H  Anticipated AUC of 423 and trough concentration of 12.7 at steady state  Renal labs as indicated   Vancomycin concentration not yet ordered  Pharmacy will continue to monitor patient and adjust therapy as indicated    Thank you for the consult,  NASEEM Hernandez [facility]  11/30/2021 1:53 AM

## 2021-12-01 PROCEDURE — 2580000003 HC RX 258: Performed by: INTERNAL MEDICINE

## 2021-12-01 PROCEDURE — 1200000003 HC TELEMETRY R&B

## 2021-12-01 PROCEDURE — 2580000003 HC RX 258: Performed by: PHARMACIST

## 2021-12-01 PROCEDURE — 6370000000 HC RX 637 (ALT 250 FOR IP): Performed by: REGISTERED NURSE

## 2021-12-01 PROCEDURE — 2580000003 HC RX 258: Performed by: REGISTERED NURSE

## 2021-12-01 PROCEDURE — APPSS30 APP SPLIT SHARED TIME 16-30 MINUTES: Performed by: NURSE PRACTITIONER

## 2021-12-01 PROCEDURE — 99232 SBSQ HOSP IP/OBS MODERATE 35: CPT | Performed by: INTERNAL MEDICINE

## 2021-12-01 PROCEDURE — 6360000002 HC RX W HCPCS: Performed by: PHARMACIST

## 2021-12-01 PROCEDURE — 6360000002 HC RX W HCPCS: Performed by: INTERNAL MEDICINE

## 2021-12-01 RX ADMIN — PIPERACILLIN AND TAZOBACTAM 3375 MG: 3; .375 INJECTION, POWDER, LYOPHILIZED, FOR SOLUTION INTRAVENOUS at 03:11

## 2021-12-01 RX ADMIN — PIPERACILLIN AND TAZOBACTAM 3375 MG: 3; .375 INJECTION, POWDER, LYOPHILIZED, FOR SOLUTION INTRAVENOUS at 18:47

## 2021-12-01 RX ADMIN — SODIUM CHLORIDE, PRESERVATIVE FREE 10 ML: 5 INJECTION INTRAVENOUS at 09:10

## 2021-12-01 RX ADMIN — ACETAMINOPHEN 650 MG: 325 TABLET ORAL at 20:27

## 2021-12-01 RX ADMIN — VANCOMYCIN HYDROCHLORIDE 750 MG: 1 INJECTION, POWDER, LYOPHILIZED, FOR SOLUTION INTRAVENOUS at 09:08

## 2021-12-01 RX ADMIN — ACETAMINOPHEN 650 MG: 325 TABLET ORAL at 07:07

## 2021-12-01 RX ADMIN — VANCOMYCIN HYDROCHLORIDE 750 MG: 1 INJECTION, POWDER, LYOPHILIZED, FOR SOLUTION INTRAVENOUS at 22:45

## 2021-12-01 RX ADMIN — PIPERACILLIN AND TAZOBACTAM 3375 MG: 3; .375 INJECTION, POWDER, LYOPHILIZED, FOR SOLUTION INTRAVENOUS at 11:05

## 2021-12-01 RX ADMIN — ACETAMINOPHEN 650 MG: 325 TABLET ORAL at 14:18

## 2021-12-01 ASSESSMENT — PAIN DESCRIPTION - LOCATION
LOCATION: BACK
LOCATION: BACK

## 2021-12-01 ASSESSMENT — PAIN DESCRIPTION - DESCRIPTORS
DESCRIPTORS: ACHING
DESCRIPTORS: ACHING

## 2021-12-01 ASSESSMENT — PAIN DESCRIPTION - ONSET
ONSET: ON-GOING
ONSET: ON-GOING

## 2021-12-01 ASSESSMENT — PAIN SCALES - GENERAL
PAINLEVEL_OUTOF10: 3
PAINLEVEL_OUTOF10: 3
PAINLEVEL_OUTOF10: 2
PAINLEVEL_OUTOF10: 3
PAINLEVEL_OUTOF10: 3
PAINLEVEL_OUTOF10: 5
PAINLEVEL_OUTOF10: 4

## 2021-12-01 ASSESSMENT — PAIN DESCRIPTION - ORIENTATION
ORIENTATION: MID
ORIENTATION: MID

## 2021-12-01 ASSESSMENT — PAIN DESCRIPTION - PROGRESSION
CLINICAL_PROGRESSION: NOT CHANGED
CLINICAL_PROGRESSION: GRADUALLY IMPROVING

## 2021-12-01 ASSESSMENT — PAIN - FUNCTIONAL ASSESSMENT
PAIN_FUNCTIONAL_ASSESSMENT: PREVENTS OR INTERFERES SOME ACTIVE ACTIVITIES AND ADLS
PAIN_FUNCTIONAL_ASSESSMENT: PREVENTS OR INTERFERES WITH MANY ACTIVE NOT PASSIVE ACTIVITIES

## 2021-12-01 ASSESSMENT — PAIN DESCRIPTION - FREQUENCY
FREQUENCY: CONTINUOUS
FREQUENCY: CONTINUOUS

## 2021-12-01 ASSESSMENT — PAIN DESCRIPTION - PAIN TYPE
TYPE: ACUTE PAIN
TYPE: ACUTE PAIN

## 2021-12-01 NOTE — PROGRESS NOTES
[facility]      Patient - Brooks Britton   MRN -  [id number]   [facility] # - [de-identified]   - 1950      Date of Admission -  2021 11:25 AM  Date of evaluation -  2021  Room - --A   Hospital Day - [address], [place] Primary Care Physician - Teodora Canas MD     Problem List      Active Hospital Problems    Diagnosis Date Noted    Lung nodule [R91.1]     Abscess of abdominal cavity [facility]) [K65.1] 2020     Reason for Consult    Lung nodules  HPI   History Obtained From: Patient and electronic medical record. Brooks Britton is a 66 y.o. male with no significant pmhx who presents with abdominal pain over the past few months with lack of appetite. He had similar symptoms this time last year and was tx for abdominal abscess--drained by IR. He did have unintentional weight loss at that time also, which has continued this year. He reports losing 30 lbs in the past 3-4 months. He denies any chest pain, SOB, nausea, vomiting, diarrhea. His only complaint is diffuse abdominal pain and poor intake. In the emergency department CT abdomen shows 9.3X9.5X3.8cm abcess right posterior pararenal space near hepatic lobe and 5mm pulm nodules with mildly enlarged caridophrenic lymph nodes. His LFT's are ok, EKG ok. Pt is afebrile, VSS. Patient on Zosyn and Vanc due to hx of Staph and E coli grown in the abscess last year. IR to drain abscess. He is having shortness of breath: No  He denies orthopnea. He denies paroxysmal nocturnal dyspnea.       He is having cough: No    He is having chest pain:No      Past 24 hrs   -on RA  -Denies CP SOB, or hemoptysis  -completed CT chest yesterday  All other systems reviewed    PMHx   Past Medical History      Diagnosis Date    Cerebral artery occlusion with cerebral infarction ([address]     Hypertension       Past Surgical History        Procedure Laterality Date    APPENDECTOMY      CHOLECYSTECTOMY  COLONOSCOPY      CT ASP ABS HEMATOMA BULLA CYST  11/30/2021    CT ASP ABS HEMATOMA BULLA CYST 11/30/2021 [facility] CT SCAN    FRACTURE SURGERY      HERNIA REPAIR      JOINT REPLACEMENT       Meds    Current Medications    vancomycin (VANCOCIN) intermittent dosing (placeholder)   Other RX Placeholder    vancomycin  750 mg IntraVENous Q12H    piperacillin-tazobactam  3,375 mg IntraVENous Q8H    sodium chloride flush  5-40 mL IntraVENous 2 times per day    enoxaparin  40 mg SubCUTAneous Daily     sodium chloride flush, sodium chloride, acetaminophen **OR** acetaminophen, promethazine **OR** ondansetron  IV Drips/Infusions   sodium chloride 25 mL (11/30/21 0243)     Home Medications  No medications prior to admission. Diet    ADULT DIET; Regular  Allergies    Patient has no known allergies. Social History     Social History     Socioeconomic History    Marital status:      Spouse name: Not on file    Number of children: Not on file    Years of education: Not on file    Highest education level: Not on file   Occupational History    Not on file   Tobacco Use    Smoking status: Never Smoker    Smokeless tobacco: Never Used   Substance and Sexual Activity    Alcohol use: Not on file    Drug use: Not on file    Sexual activity: Not on file   Other Topics Concern    Not on file   Social History Narrative    Not on file     Social Determinants of Health     Financial Resource Strain:     Difficulty of Paying Living Expenses: Not on file   Food Insecurity:     Worried About Running Out of Food in the Last Year: Not on file    Arielle of Food in the Last Year: Not on file   Transportation Needs:     Lack of Transportation (Medical): Not on file    Lack of Transportation (Non-Medical):  Not on file   Physical Activity:     Days of Exercise per Week: Not on file    Minutes of Exercise per Session: Not on file   Stress:     Feeling of Stress : Not on file   Social Connections:     Frequency of Communication with Friends and Family: Not on file    Frequency of Social Gatherings with Friends and Family: Not on file    Attends [employer] Services: Not on file    Active Member of Clubs or Organizations: Not on file    Attends Club or Organization Meetings: Not on file    Marital Status: Not on file   Intimate Partner Violence:     Fear of Current or Ex-Partner: Not on file    Emotionally Abused: Not on file    Physically Abused: Not on file    Sexually Abused: Not on file   Housing Stability:     Unable to Pay for Housing in the Last Year: Not on file    Number of [place] in the Last Year: Not on file    Unstable Housing in the Last Year: Not on file     Family History    No family history on file. Sleep History    Never diagnosed with sleep apnea in the past.  Occupational history   Occupation:  He is current working: No  Type of profession: unemployed, disabled. History of tobacco smoking:No    History of recreational or IV drug use in the past:NO     History of exposure to coal mines/coal dust: NO  History of exposure to foundry dust/welding: NO  History of exposure to quarry/silica/sandblasting: NO  History of exposure to asbestos/working with breaks/ships: NO  History of exposure to farm dust: NO  History of recent travel to long distances: NO  History of exposure to birds, pigeons, or chickens in the past:NO    History of pulmonary embolism in the past: No            History of DVT in the past:No              Vitals     height is 5' 8\" (1.727 m) and weight is 125 lb (56.7 kg). His oral temperature is 97.1 °F (36.2 °C). His blood pressure is 115/66 and his pulse is 68. His respiration is 16 and oxygen saturation is 96%. Body mass index is 19.01 kg/m².     SUPPLEMENTAL O2:       I/O        Intake/Output Summary (Last 24 hours) at 12/1/2021 9190  Last data filed at 12/1/2021 0910  Gross per 24 hour   Intake 310 ml   Output 0 ml   Net 310 ml     I/O last 3 completed shifts: In: 310 [P.O.:300; I.V.:10]  Out: 300 [Urine:300]   Patient Vitals for the past 96 hrs (Last 3 readings):   Weight   11/29/21 1122 125 lb (56.7 kg)       Exam   Physical Exam   Constitutional: No distress on RA. Patient appears elderly. Head: Normocephalic and atraumatic. Mouth/Throat: Oropharynx is clear and moist.  No oral thrush. Eyes: Conjunctivae are normal. Pupils are equal, round. No scleral icterus. Neck: Neck supple. No tracheal deviation present. Cardiovascular: S1 and S2 with no murmur. No peripheral edema  Pulmonary/Chest: Normal effort with bilateral air entry, clear breath sounds. No stridor. No respiratory distress. Patient exhibits no tenderness. Abdominal: Soft. Bowel sounds audible. No distension or tenderness to palp. Musculoskeletal: Moves all extremities  Neurological: Patient is alert and oriented to person, place, and time. Labs  - Old records and notes have been reviewed in [facility]   ABG  No results found for: PH, PO2, PCO2, HCO3, O2SAT  No results found for: IFIO2, MODE, SETTIDVOL, SETPEEP  CBC  Recent Labs     11/29/21  1204 11/30/21  0611   WBC 10.4 8.8   RBC 4.21* 3.81*   HGB 10.9* 9.7*   HCT 36.5* 32.7*   MCV 86.7 85.8   MCH 25.9* 25.5*   MCHC 29.9* 29.7*    221   MPV 8.4* 8.6*      BMP  Recent Labs     11/29/21  1204 11/30/21  0611   * 136   K 3.8 4.0    102   CO2 25 22*   BUN 9 9   CREATININE 0.6 0.6   GLUCOSE 91 88   CALCIUM 9.1 8.6     LFT  Recent Labs     11/29/21  1204   AST 13   ALT <5*   BILITOT 0.4   ALKPHOS 77   LIPASE 27.9     TROP  Lab Results   Component Value Date    TROPONINT < 0.010 11/29/2021     BNP  No results for input(s): BNP in the last 72 hours. Lactic Acid  No results for input(s): LACTA in the last 72 hours. INR  No results for input(s): INR, PROTIME in the last 72 hours. PTT  No results for input(s): APTT in the last 72 hours. Glucose  No results for input(s): POCGLU in the last 72 hours.   UA No results for input(s): Janyth Skeeters, COLORU, CLARITYU, MUCUS, PROTEINU, BLOODU, RBCUA, WBCUA, BACTERIA, NITRU, GLUCOSEU, BILIRUBINUR, UROBILINOGEN, KETUA, LABCAST, LABCASTTY, AMORPHOS in the last 72 hours. Invalid input(s): CRYSTALS. PFTs   None found     Sleep studies   None found     Cultures    Blood cultures x2: no preliminary growth   COVID 19 negative     EKG     Echocardiogram      Conclusions      Summary   Ejection fraction is visually estimated at 55%. Overall left ventricular function is normal.      Signature      ----------------------------------------------------------------   Electronically signed by Cricket Amaro MD (Interpreting   physician) on 11/30/2021 at 07:42 PM   ----------------------------------------------------------------      Radiology    CXR  2VW CXR 11/29/21:  Patchy right lower lobe airspace opacity and small right pleural effusion suggest pneumonia in the appropriate clinical setting. Follow-up to ensure resolution is advised. CT Scans  (See actual reports for details)    CT CHEST W CONTRAST   11/30/2021   FINDINGS:  Again seen is a 9.7 x 4.2 cm abscess collection in the right posterior pararenal space just posterior to the right hepatic lobe. There is a 1 to 2 mm right upper lobe pulmonary nodule (series 2, image 15). A 4 mm right upper lobe pulmonary nodule (image 18) a 5 mm right middle lobe pulmonary nodule (image 42). A 4 mm subpleural left lower lobe pulmonary nodule (image 37). A 4 mm lingular pulmonary nodule (image 42). Subsegmental atelectasis seen in the lung bases bilaterally. Ectasia of the ascending thoracic aorta. Aortocoronary calcifications. Calcified granulomas are seen in the superior segment of the left lower lobe and in the left lung base. No large pulmonary mass. Central airway is patent. Miniscule right pleural effusion. No significant pericardial effusion. The heart is not enlarged. The main pulmonary artery is not dilated.   No significantly Degenerative changes of the thoracolumbar spine. Minimal spondylolisthesis of L5. Left hip arthroplasty has been performed. Intramedullary ruth ann and screw fixation of the right femur is seen.         Assessment   -Multiple pulmonary nodules including: 3 mm right middle lobe pulmonary nodule, subpleural 5 mm right middle lobe pulmonary nodule, subpleural 2mm right middle lobe pulmonary nodule, 4 mm lingular pulmonary nodule, 4 mm subpleural left lower lobe pulmonary nodule  -1.5 cm mildly enlarged lymph node in the right epicardial fat   -Calcified granuloma in left lung base   -Atelectasis of left lung base   -Small right pleural effusion   -Retroperitoneal abscess   -History of TB exposure   -Hx of CVA  -Essential HTN  -Hx of shingles on his back  -Unintentional weight loss   Plan   -CT chest with multiple lung nodules < 5 mm patient reports he had previous had lung nodules identified but does not recall specifically having CT of chest completed   -Noted multiple CT scans in chart from [facility] will request records of any Ct of chest to compare to current   -Pending if he has a historical CT of chest will determine further care  -ID service following   -Primary planning abscess drainage     Questions and concerns addressed. Electronically signed by   SOLO Junior CNP on 12/1/2021 at 5:49 PM     Addendum by Dr. Galileo Georges MD:  I have seen and examined the patient independently. Face to face evaluation and examination was performed. The above evaluation and note has been reviewed. Labs and radiographs were reviewed. I Have discussed with Mr. Rosario Conti CNP about this patient in detail. The above assessment and plan has been reviewed. Please see my modifications mentioned below. My modifications:  He is on room air  Blood cultures 2 sets no growth.   Echocardiogram performed 30 November 2021:  [phone] 11/30/2021   Narrative & Impression  Transthoracic Echocardiography Report (TTE)  Conclusions      Summary   Ejection fraction is visually estimated at 55%. Overall left ventricular function is normal.      Signature  Right Ventricle   The right ventricular size was normal with normal systolic function and   wall thickness.     CT scan of chest with contrast was performed on 30 November 2021-reviewed      Requested old CT films from [facility] in [place], [place] will review once available    Phyllis Isabel MD 12/1/2021 6:16 PM

## 2021-12-01 NOTE — PROGRESS NOTES
Progress note      [facility]             Patient:  Fred Benjamin  YOB: 1950    MRN: [id number]   Acct:  [de-identified]   7K-03/003-A  Primary Care Physician: Flaquita Vick MD    Admit Date: 11/29/2021           Subjective: Some back pain s/p drainage. Abdominal pain improved. No other complaints. Objective:      Physical Exam:    Vitals:  Patient Vitals for the past 24 hrs:   BP Temp Temp src Pulse Resp SpO2   12/01/21 1230 98/62 98.2 °F (36.8 °C) Oral 73 16 98 %   12/01/21 0900 (!) 89/53 98.2 °F (36.8 °C) Oral 70 16 98 %   12/01/21 0400 128/75 98.5 °F (36.9 °C) Oral 75 18 98 %   12/01/21 0035 110/66 97.8 °F (36.6 °C) Oral 63 18 99 %   11/30/21 2033 (!) 97/59 98.4 °F (36.9 °C) Oral 70 18 96 %   11/30/21 1515 123/71 98.6 °F (37 °C) Oral 72 16 99 %     Weight: Weight: 125 lb (56.7 kg)     24 hour intake/output:    Intake/Output Summary (Last 24 hours) at 12/1/2021 1437  Last data filed at 12/1/2021 0910  Gross per 24 hour   Intake 310 ml   Output 300 ml   Net 10 ml       General appearance - alert, well appearing, and in no distress  Eyes - pupils equal and reactive, extraocular eye movements intact  Mouth - mucous membranes moist, pharynx normal without lesions  Neck - supple, no significant adenopathy  Chest - clear to auscultation, no wheezes, rales or rhonchi, symmetric air entry  Heart - normal rate, regular rhythm, normal S1, S2, no murmurs, rubs, clicks or gallops  Abdomen - soft, tender to palpation especially RLQ, nondistended, no masses or organomegaly, pos bs.   Neurological - alert, oriented, normal speech, no focal findings or movement disorder noted  Musculoskeletal - no joint tenderness, deformity or swelling  Extremities - peripheral pulses normal, no pedal edema, no clubbing or cyanosis  Skin - normal coloration and turgor, no rashes, no suspicious skin lesions noted    Review of Labs and Diagnostic Testing:    CBC:   Recent Labs     11/30/21  0611   WBC 8.8   HGB 9.7*   HCT 32.7*   MCV 85.8        BMP:   Recent Labs     11/30/21  0611      K 4.0      CO2 22*   BUN 9   CREATININE 0.6   CALCIUM 8.6   GLUCOSE 88     PT/INR: No results for input(s): PROTIME, INR in the last 72 hours. APTT: No results for input(s): APTT in the last 72 hours. Lipids:   Recent Labs     11/29/21  1204   ALKPHOS 77   ALT <5*   AST 13   BILITOT 0.4   BILIDIR <0.2   LABALBU 3.2*   LIPASE 27.9     Troponin:   Recent Labs     11/29/21  1204   TROPONINT < 0.010        Imaging:  [unfilled]    EKG:      Diet: ADULT DIET; Regular        Data:   Scheduled Meds: Scheduled Meds:   vancomycin (VANCOCIN) intermittent dosing (placeholder)   Other RX Placeholder    vancomycin  750 mg IntraVENous Q12H    piperacillin-tazobactam  3,375 mg IntraVENous Q8H    sodium chloride flush  5-40 mL IntraVENous 2 times per day    enoxaparin  40 mg SubCUTAneous Daily     Continuous Infusions:   sodium chloride 25 mL (11/30/21 0243)     PRN Meds:.sodium chloride flush, sodium chloride, acetaminophen **OR** acetaminophen, promethazine **OR** ondansetron  Continuous Infusions:   sodium chloride 25 mL (11/30/21 0243)         Assessment/Plan   1. Intra-abdominal abscess  -S/P IR drainage of abscess yesterday (growing enteric G- bacilli); ID believes it would be best for indwelling drain to be placed--discussed with IR and will do CT guided drain placement tomorrow  -Discussed with gen surg and no need for surgical consult at this time; pt needs a full GI workup eventually for cause of recurrent abscess  -ID following  -Cont Zosyn and Vanc  -Echo WNL; no signs of endocarditis     2. Pulmonary nodules   -Pulmonary following; CT chest showed sub nodules 5mm recommend follow up in 1 year     3.  Weight loss and poor appetite  -Needs a GI workup for cause for recurrent abscess and related symptoms; can be done outpt after discharge -CEA WNL    4. DVT prophylaxis   -Lovenox       Assessment and plan of care discussed with supervising physician, Dr Jillian Guillaume. Electronically signed by SOLO Atkinson - CNP on 12/1/2021 at 2:37 PM     Addendum/attestation by Mateus Savage MD:  I have seen and examined the patient independently. Face to face evaluation and examination was performed. The above evaluation and note has been reviewed. Laboratory and radiological data were reviewed. I Have discussed with Apryl Delvalle CNP about this patient in detail. The above assessment and plan has been reviewed. Please see my modifications mentioned below.       My modifications:  Electronically signed by Janice Beasley MD on 12/1/2021 at 4:39 PM

## 2021-12-02 ENCOUNTER — APPOINTMENT (OUTPATIENT)
Dept: CT IMAGING | Age: 71
DRG: 441 | End: 2021-12-02
Payer: COMMERCIAL

## 2021-12-02 PROBLEM — E43 SEVERE MALNUTRITION (HCC): Status: ACTIVE | Noted: 2021-12-02

## 2021-12-02 LAB — VANCOMYCIN RANDOM: 15.6 UG/ML (ref 0.1–39.9)

## 2021-12-02 PROCEDURE — 6370000000 HC RX 637 (ALT 250 FOR IP): Performed by: REGISTERED NURSE

## 2021-12-02 PROCEDURE — 99232 SBSQ HOSP IP/OBS MODERATE 35: CPT | Performed by: INTERNAL MEDICINE

## 2021-12-02 PROCEDURE — APPSS30 APP SPLIT SHARED TIME 16-30 MINUTES: Performed by: NURSE PRACTITIONER

## 2021-12-02 PROCEDURE — 75989 ABSCESS DRAINAGE UNDER X-RAY: CPT

## 2021-12-02 PROCEDURE — 36415 COLL VENOUS BLD VENIPUNCTURE: CPT

## 2021-12-02 PROCEDURE — 1200000003 HC TELEMETRY R&B

## 2021-12-02 PROCEDURE — 6360000002 HC RX W HCPCS: Performed by: INTERNAL MEDICINE

## 2021-12-02 PROCEDURE — 80202 ASSAY OF VANCOMYCIN: CPT

## 2021-12-02 PROCEDURE — 2709999900 CT GUIDED NEEDLE PLACEMENT

## 2021-12-02 PROCEDURE — 0F9030Z DRAINAGE OF LIVER WITH DRAINAGE DEVICE, PERCUTANEOUS APPROACH: ICD-10-PCS | Performed by: INTERNAL MEDICINE

## 2021-12-02 PROCEDURE — 6360000002 HC RX W HCPCS: Performed by: RADIOLOGY

## 2021-12-02 PROCEDURE — 2580000003 HC RX 258: Performed by: INTERNAL MEDICINE

## 2021-12-02 RX ORDER — MIDAZOLAM HYDROCHLORIDE 1 MG/ML
0.5 INJECTION INTRAMUSCULAR; INTRAVENOUS ONCE
Status: COMPLETED | OUTPATIENT
Start: 2021-12-02 | End: 2021-12-02

## 2021-12-02 RX ORDER — FENTANYL CITRATE 50 UG/ML
25 INJECTION, SOLUTION INTRAMUSCULAR; INTRAVENOUS ONCE
Status: COMPLETED | OUTPATIENT
Start: 2021-12-02 | End: 2021-12-02

## 2021-12-02 RX ADMIN — ACETAMINOPHEN 650 MG: 325 TABLET ORAL at 20:56

## 2021-12-02 RX ADMIN — ACETAMINOPHEN 650 MG: 325 TABLET ORAL at 08:49

## 2021-12-02 RX ADMIN — PIPERACILLIN AND TAZOBACTAM 3375 MG: 3; .375 INJECTION, POWDER, LYOPHILIZED, FOR SOLUTION INTRAVENOUS at 02:29

## 2021-12-02 RX ADMIN — PIPERACILLIN AND TAZOBACTAM 3375 MG: 3; .375 INJECTION, POWDER, LYOPHILIZED, FOR SOLUTION INTRAVENOUS at 18:13

## 2021-12-02 RX ADMIN — MIDAZOLAM 0.5 MG: 1 INJECTION INTRAMUSCULAR; INTRAVENOUS at 11:36

## 2021-12-02 RX ADMIN — ACETAMINOPHEN 650 MG: 325 TABLET ORAL at 02:29

## 2021-12-02 RX ADMIN — ACETAMINOPHEN 650 MG: 325 TABLET ORAL at 15:25

## 2021-12-02 RX ADMIN — PIPERACILLIN AND TAZOBACTAM 3375 MG: 3; .375 INJECTION, POWDER, LYOPHILIZED, FOR SOLUTION INTRAVENOUS at 10:52

## 2021-12-02 RX ADMIN — FENTANYL CITRATE 25 MCG: 0.05 INJECTION, SOLUTION INTRAMUSCULAR; INTRAVENOUS at 11:36

## 2021-12-02 ASSESSMENT — PAIN SCALES - GENERAL
PAINLEVEL_OUTOF10: 3
PAINLEVEL_OUTOF10: 3
PAINLEVEL_OUTOF10: 0
PAINLEVEL_OUTOF10: 3
PAINLEVEL_OUTOF10: 1
PAINLEVEL_OUTOF10: 3
PAINLEVEL_OUTOF10: 4
PAINLEVEL_OUTOF10: 1

## 2021-12-02 ASSESSMENT — PAIN DESCRIPTION - PAIN TYPE: TYPE: CHRONIC PAIN

## 2021-12-02 ASSESSMENT — PAIN DESCRIPTION - PROGRESSION: CLINICAL_PROGRESSION: NOT CHANGED

## 2021-12-02 ASSESSMENT — PAIN DESCRIPTION - DESCRIPTORS: DESCRIPTORS: NAGGING

## 2021-12-02 ASSESSMENT — PAIN DESCRIPTION - ORIENTATION: ORIENTATION: LOWER

## 2021-12-02 ASSESSMENT — PAIN DESCRIPTION - LOCATION: LOCATION: BACK

## 2021-12-02 ASSESSMENT — PAIN DESCRIPTION - FREQUENCY: FREQUENCY: CONTINUOUS

## 2021-12-02 ASSESSMENT — PAIN DESCRIPTION - ONSET: ONSET: ON-GOING

## 2021-12-02 ASSESSMENT — PAIN - FUNCTIONAL ASSESSMENT: PAIN_FUNCTIONAL_ASSESSMENT: ACTIVITIES ARE NOT PREVENTED

## 2021-12-02 NOTE — PROGRESS NOTES
1059 Pt arrived to CT holding. Skin natural for race, warm, dry. Respirations even and unlabored. Pain 3/10 to lower back. 46 Dr. Gia Bella in to evaluate pt and explain procedure. 1113 Consent obtained. 1122 Pt positioned left side down on CT table. Monitor applied. 1140 Procedure started with Dr. Gia Bella  1150 Procedure complete. RUQ drain secured with stay fix dressing and covered with opsite. 18 Family updated per Dr. Gia Bella  [phone] Pt transported to [address] [place] [address] via bed with transport. Report called to [employer], RN. Skin natural for race, warm, dry. Respirations even and unlabored. Pt positioned with HOB elevated for pt comfort, no complaints voiced at this time.

## 2021-12-02 NOTE — PROGRESS NOTES
Flushed RUQ Truclose drain with 10ml Normal Saline. Line completely cleared. Patient tolerated well.  [facility]

## 2021-12-02 NOTE — PROGRESS NOTES
Progress note      Internal Medicine Specialities             Patient:  Marcus Sanabria  YOB: 1950    MRN: [id number]   Acct:  [de-identified]   7K--A  Primary Care Physician: Keven Núñez MD    Admit Date: 2021           Subjective: Drain placed by IR, purulent material in drainage bag      Objective:      Physical Exam:    Vitals:  Patient Vitals for the past 24 hrs:   BP Temp Temp src Pulse Resp SpO2 Weight   21 1225 127/77 98.3 °F (36.8 °C) Oral 64 18 -- --   21 1210 129/75 98.2 °F (36.8 °C) Oral 76 18 98 % --   21 1150 (!) 99/51 -- -- 72 23 99 % --   21 1145 (!) 111/51 -- -- 72 22 98 % --   21 1140 (!) 112/50 -- -- 71 18 98 % --   21 1125 (!) 128/38 -- -- 80 17 99 % --   21 1045 -- -- -- -- -- -- 138 lb 1.6 oz (62.6 kg)   21 0810 120/69 97.8 °F (36.6 °C) Oral 66 18 98 % --   21 0353 114/71 97.7 °F (36.5 °C) Oral 65 18 97 % --   210 100/62 97.9 °F (36.6 °C) Oral 63 18 98 % --   21 101/64 98 °F (36.7 °C) Oral 67 18 99 % --   21 1515 115/66 97.1 °F (36.2 °C) Oral 68 16 96 % --     Weight: Weight: 138 lb 1.6 oz (62.6 kg)     24 hour intake/output:    Intake/Output Summary (Last 24 hours) at 2021 1255  Last data filed at 2021 0810  Gross per 24 hour   Intake 375 ml   Output --   Net 375 ml       General appearance - alert, well appearing, and in no distress  Eyes - pupils equal and reactive, extraocular eye movements intact  Mouth - mucous membranes moist, pharynx normal without lesions  Neck - supple, no significant adenopathy  Chest - clear to auscultation, no wheezes, rales or rhonchi, symmetric air entry  Heart - normal rate, regular rhythm, normal S1, S2, no murmurs, rubs, clicks or gallops  Abdomen - soft, tender to palpation RUQ, nondistended, no masses or organomegaly, pos bs.   Neurological - alert, oriented, normal speech, no focal findings or movement disorder noted  Musculoskeletal - no joint tenderness, deformity or swelling  Extremities - peripheral pulses normal, no pedal edema, no clubbing or cyanosis  Skin - normal coloration and turgor, no rashes, no suspicious skin lesions noted    Review of Labs and Diagnostic Testing:    CBC:   Recent Labs     11/30/21  0611   WBC 8.8   HGB 9.7*   HCT 32.7*   MCV 85.8        BMP:   Recent Labs     11/30/21  0611      K 4.0      CO2 22*   BUN 9   CREATININE 0.6   CALCIUM 8.6   GLUCOSE 88     PT/INR: No results for input(s): PROTIME, INR in the last 72 hours. APTT: No results for input(s): APTT in the last 72 hours. Lipids:   No results for input(s): ALKPHOS, ALT, AST, BILITOT, BILIDIR, LABALBU, AMYLASE, LIPASE in the last 72 hours. Troponin:   No results for input(s): TROPONINT in the last 72 hours. Imaging:  [unfilled]    EKG:      Diet: ADULT ORAL NUTRITION SUPPLEMENT; Breakfast, Dinner; Standard 4 oz Oral Supplement  ADULT ORAL NUTRITION SUPPLEMENT; Lunch; Frozen Oral Supplement  ADULT DIET; Regular        Data:   Scheduled Meds: Scheduled Meds:   piperacillin-tazobactam  3,375 mg IntraVENous Q8H    sodium chloride flush  5-40 mL IntraVENous 2 times per day    enoxaparin  40 mg SubCUTAneous Daily     Continuous Infusions:   sodium chloride 25 mL (11/30/21 0243)     PRN Meds:.sodium chloride flush, sodium chloride, acetaminophen **OR** acetaminophen, promethazine **OR** ondansetron  Continuous Infusions:   sodium chloride 25 mL (11/30/21 0243)         Assessment/Plan   1. Intra-abdominal abscess  -S/P IR drainage of abscess, CT guided drain in place (growing E. Coli sensitive to zosyn);-Discussed with gen surg and no need for surgical consult at this time; pt needs a full GI workup eventually for cause of recurrent abscess  -ID following  -Cont Zosyn and Vanc  -Echo WNL; no signs of endocarditis     2.  Pulmonary nodules   -Pulmonary following; CT chest showed sub

## 2021-12-02 NOTE — PROGRESS NOTES
Flushed RUQ Truclose drain with 10ml Normal Saline. 6\" of  line cleared of thick brown bloody drainage. Patient tolerated well.  [address]

## 2021-12-02 NOTE — PROGRESS NOTES
Comprehensive Nutrition Assessment    Type and Reason for Visit:  Initial, Consult (verbal consult to eval for malnutrition)    Nutrition Recommendations/Plan:   -Consider MVI. -ONS initiated: Ensure Compact BID and Magic Cup daily.  -Resume diet post-drain placement when appropriate. Nutrition Assessment:    Pt. severely malnourished AEB criteria listed below. At risk for further nutritional compromise r/t NPO for drain placement later today, admit with abdominal pain, kidney stone, abscess of abdominal cavity-planning drain placement 12/2/21, lung nodule, and underlying medical condition (hx: HTN, CVA). Nutrition recommendations/interventions as per above. Malnutrition Assessment:  Malnutrition Status:  Severe malnutrition    Context:  Acute Illness     Findings of the 6 clinical characteristics of malnutrition:  Energy Intake:  7 - 50% or less of estimated energy requirements for 5 or more days  Weight Loss:   (Reported 10.9% loss in the last 3 months or more, no EMR weights to verify)     Body Fat Loss:   (Severe muscle mass loss) Orbital, Fat Overlying Ribs, Triceps   Muscle Mass Loss:   (Severe muscle mass loss) Temples (temporalis), Clavicles (pectoralis & deltoids), Thigh (quadraceps), Calf (gastrocnemius)  Fluid Accumulation:  No significant fluid accumulation     Strength:  Not Performed    Estimated Daily Nutrient Needs:  Energy (kcal):  2873-8023 kcals (25-30 kcals/kg/day); Weight Used for Energy Requirements:   (63 kg)     Protein (g):  76 grams or more (1.2 grams/kg/day); Weight Used for Protein Requirements:   (63 kg)          Nutrition Related Findings:    Patient seen, reports nothing to eat today yet d/t awaiting drain placement d/t abscess in abdomen. States very poor oral intake in the last 3 months with unplanned weight loss, belly pain/burning that increased with eating. Using ONS at home, agreeable to here. Patient reports weakness d/t inadequate oral intake prior to admit. Labs noted. Rx; zosyn, vanc. Oral intake not recorded since admit. Wounds:  None       Current Nutrition Therapies:    ADULT DIET; Regular  ADULT ORAL NUTRITION SUPPLEMENT; Breakfast, Dinner; Standard 4 oz Oral Supplement  ADULT ORAL NUTRITION SUPPLEMENT; Lunch; Frozen Oral Supplement    Anthropometric Measures:  · Height: 5' 8\" (172.7 cm)  · Current Body Weight: 138 lb 1.6 oz (62.6 kg) (12/2/21 bedscale, no edema)   · Admission Body Weight: 125 lb (56.7 kg) (11/29/21 stated, no edema)    · Usual Body Weight:  (~ 155# per pt. Per EMR: 11/30/20: 135#9. 6oz)     · Ideal Body Weight: 154 lbs;   · BMI: 21  · Adjusted Body Weight:  ; No Adjustment   · BMI Categories: Underweight (BMI less than 22) age over 72       Nutrition Diagnosis:   · Severe malnutrition, In context of acute illness or injury related to inadequate protein-energy intake as evidenced by poor intake prior to admission, severe loss of subcutaneous fat, severe muscle loss      Nutrition Interventions:   Food and/or Nutrient Delivery:   (When diet resumed after procedure today plan to start oral nutritional supplement)  Nutrition Education/Counseling:  Education initiated (Encouraged oral intake, good protein sources, and ONS use.)   Coordination of Nutrition Care:  Continue to monitor while inpatient    Goals:  Patient will consume 75% or more of meals during LOS. Nutrition Monitoring and Evaluation:   Behavioral-Environmental Outcomes:  None Identified   Food/Nutrient Intake Outcomes:  Diet Advancement/Tolerance, Food and Nutrient Intake, Supplement Intake  Physical Signs/Symptoms Outcomes:  Biochemical Data, GI Status, Fluid Status or Edema, Nutrition Focused Physical Findings, Weight, Skin     Discharge Planning:     Too soon to determine     Electronically signed by Emma Sanabria RD, LD on 12/2/21 at 10:54 AM EST    Contact: [phone]

## 2021-12-02 NOTE — PROGRESS NOTES
Department of Radiology  Post Procedure Progress Note      Pre-Procedure Diagnosis:  Retroperitoneal perihepatic abscess    Procedure Performed:  CT guided drain placement    Anesthesia: local / versed and fentanyl    Findings: successful    Immediate Complications:  None    Estimated Blood Loss: minimal    SEE DICTATED PROCEDURE NOTE FOR COMPLETE DETAILS.     Felix Seals DO DO, MD  12/2/2021 12:02 PM

## 2021-12-02 NOTE — PROGRESS NOTES
Patient in bed A&Ox4 with family at bedside. Returned from [facility] Truclose drain placement. Drain & Dressing intact. Patient vitals obtained. Abdomen remains soft, non-tender. Denies pain, nausea, & needs at present.  [facility]

## 2021-12-02 NOTE — PROGRESS NOTES
Progress note: Infectious diseases    Patient - Kahlil Begum,  Age - 66 y.o.    - 1950      Room Number - 7K-03/003-A   MRN -  [id number]   Acct # - [de-identified]  Date of Admission -  2021 11:25 AM    SUBJECTIVE:   He had a drain placed over the abcess  OBJECTIVE   VITALS    height is 5' 8\" (1.727 m) and weight is 138 lb 1.6 oz (62.6 kg). His oral temperature is 98.1 °F (36.7 °C). His blood pressure is 126/74 and his pulse is 74. His respiration is 18 and oxygen saturation is 98%.        Wt Readings from Last 3 Encounters:   21 138 lb 1.6 oz (62.6 kg)   20 135 lb 9.6 oz (61.5 kg)       I/O (24 Hours)    Intake/Output Summary (Last 24 hours) at 2021 1454  Last data filed at 2021 1400  Gross per 24 hour   Intake 1090.04 ml   Output --   Net 1090.04 ml       General Appearance  Awake, alert, oriented,  not  In acute distress  HEENT - normocephalic, atraumatic, slighlty pale conjunctiva,  anicteric sclera  Neck - Supple, no mass  Lungs -  Bilateral   air entry, no rhonchi, no wheeze  Cardiovascular - Heart sounds are normal.     Abdomen - soft, not distended, nontender, drain over the right flank area  Neurologic -oriented  Skin - No bruising or bleeding  Extremities - No edema, no cyanosis, clubbing     MEDICATIONS:      piperacillin-tazobactam  3,375 mg IntraVENous Q8H    sodium chloride flush  5-40 mL IntraVENous 2 times per day    enoxaparin  40 mg SubCUTAneous Daily      sodium chloride 25 mL (21 0243)     sodium chloride flush, sodium chloride, acetaminophen **OR** acetaminophen, promethazine **OR** ondansetron      LABS:     CBC:   Recent Labs     21  0611   WBC 8.8   HGB 9.7*        BMP:    Recent Labs     21  0611      K 4.0      CO2 22*   BUN 9   CREATININE 0.6   GLUCOSE 88     Calcium:  Recent Labs     21  0611   CALCIUM 8.6        CULTURES: UA: No results for input(s): SPECGRAV, PHUR, COLORU, CLARITYU, MUCUS, PROTEINU, BLOODU, RBCUA, WBCUA, BACTERIA, NITRU, GLUCOSEU, BILIRUBINUR, UROBILINOGEN, KETUA, LABCAST, LABCASTTY, AMORPHOS in the last 72 hours.     Invalid input(s): CRYSTALS  Micro:   Lab Results   Component Value Date    BC No growth-preliminary  11/29/2021          Problem list of patient:     Patient Active Problem List   Diagnosis Code    Abscess of abdominal cavity (HCC) K65.1    Weight loss, unintentional R63.4    Lower abdominal pain R10.30    Moderate malnutrition (HCC) E44.0    Lung nodule R91.1    Severe malnutrition ([address] E43         ASSESSMENT/PLAN   Abscess over the right kidney /liver area: he has a drain placed  Continue iv zosyn  Will continue to follow patient      Bryanna Jeffery MD, MD, FACP 12/2/2021 2:54 PM

## 2021-12-02 NOTE — PROGRESS NOTES
[place] [facility]      Patient - Eve Virgen   MRN -  [id number]   [facility]   - 1950      Date of Admission -  2021 11:25 AM  Date of evaluation -  2021  Room - --A   Hospital Day - [address] Errol Cruz MD Primary Care Physician - Sheri Seaman MD     Problem List      Active Hospital Problems    Diagnosis Date Noted    Severe malnutrition ([facility] [E43] 2021     Class: Acute    Lung nodule [R91.1]     Abscess of abdominal cavity ([facility] [K65.1] 2020     Reason for Consult    Lung nodules  HPI   History Obtained From: Patient and electronic medical record. Eve Virgen is a 66 y.o. male with no significant pmhx who presents with abdominal pain over the past few months with lack of appetite. He had similar symptoms this time last year and was tx for abdominal abscess--drained by IR. He did have unintentional weight loss at that time also, which has continued this year. He reports losing 30 lbs in the past 3-4 months. He denies any chest pain, SOB, nausea, vomiting, diarrhea. His only complaint is diffuse abdominal pain and poor intake. In the emergency department CT abdomen shows 9.3X9.5X3.8cm abcess right posterior pararenal space near hepatic lobe and 5mm pulm nodules with mildly enlarged caridophrenic lymph nodes. His LFT's are ok, EKG ok. Pt is afebrile, VSS. Patient on Zosyn and Vanc due to hx of Staph and E coli grown in the abscess last year. IR to drain abscess. He is having shortness of breath: No  He denies orthopnea. He denies paroxysmal nocturnal dyspnea.       He is having cough: No    He is having chest pain:No      Past 24 hrs   -on RA  -Seen post procedure  -Denies SOB, CP , hemoptysis, or cough  All other systems reviewed    PMHx   Past Medical History      Diagnosis Date    Cerebral artery occlusion with cerebral infarction ([facility]     Hypertension       Past Surgical History Procedure Laterality Date    APPENDECTOMY      CHOLECYSTECTOMY      COLONOSCOPY      CT ASP ABS HEMATOMA BULLA CYST  11/30/2021    CT ASP ABS HEMATOMA BULLA CYST 11/30/2021 [facility] CT SCAN    FRACTURE SURGERY      HERNIA REPAIR      JOINT REPLACEMENT       Meds    Current Medications    piperacillin-tazobactam  3,375 mg IntraVENous Q8H    sodium chloride flush  5-40 mL IntraVENous 2 times per day    enoxaparin  40 mg SubCUTAneous Daily     sodium chloride flush, sodium chloride, acetaminophen **OR** acetaminophen, promethazine **OR** ondansetron  IV Drips/Infusions   sodium chloride 25 mL (11/30/21 0243)     Home Medications  No medications prior to admission. Diet    ADULT ORAL NUTRITION SUPPLEMENT; Breakfast, Dinner; Standard 4 oz Oral Supplement  ADULT ORAL NUTRITION SUPPLEMENT; Lunch; Frozen Oral Supplement  ADULT DIET; Regular  Allergies    Patient has no known allergies. Social History     Social History     Socioeconomic History    Marital status:      Spouse name: Not on file    Number of children: Not on file    Years of education: Not on file    Highest education level: Not on file   Occupational History    Not on file   Tobacco Use    Smoking status: Never Smoker    Smokeless tobacco: Never Used   Substance and Sexual Activity    Alcohol use: Not on file    Drug use: Not on file    Sexual activity: Not on file   Other Topics Concern    Not on file   Social History Narrative    Not on file     Social Determinants of Health     Financial Resource Strain:     Difficulty of Paying Living Expenses: Not on file   Food Insecurity:     Worried About Running Out of Food in the Last Year: Not on file    Arielle of Food in the Last Year: Not on file   Transportation Needs:     Lack of Transportation (Medical): Not on file    Lack of Transportation (Non-Medical):  Not on file   Physical Activity:     Days of Exercise per Week: Not on file    Minutes of Exercise per Session: Not on file   Stress:     Feeling of Stress : Not on file   Social Connections:     Frequency of Communication with Friends and Family: Not on file    Frequency of Social Gatherings with Friends and Family: Not on file    Attends [employer] Services: Not on file    Active Member of Clubs or Organizations: Not on file    Attends Club or Organization Meetings: Not on file    Marital Status: Not on file   Intimate Partner Violence:     Fear of Current or Ex-Partner: Not on file    Emotionally Abused: Not on file    Physically Abused: Not on file    Sexually Abused: Not on file   Housing Stability:     Unable to Pay for Housing in the Last Year: Not on file    Number of [place] in the Last Year: Not on file    Unstable Housing in the Last Year: Not on file     Family History    No family history on file. Sleep History    Never diagnosed with sleep apnea in the past.  Occupational history   Occupation:  He is current working: No  Type of profession: unemployed, disabled. History of tobacco smoking:No    History of recreational or IV drug use in the past:NO     History of exposure to coal mines/coal dust: NO  History of exposure to foundry dust/welding: NO  History of exposure to quarry/silica/sandblasting: NO  History of exposure to asbestos/working with breaks/ships: NO  History of exposure to farm dust: NO  History of recent travel to long distances: NO  History of exposure to birds, pigeons, or chickens in the past:NO    History of pulmonary embolism in the past: No            History of DVT in the past:No              Vitals     height is 5' 8\" (1.727 m) and weight is 138 lb 1.6 oz (62.6 kg). His oral temperature is 98.3 °F (36.8 °C). His blood pressure is 127/77 and his pulse is 64. His respiration is 18 and oxygen saturation is 98%. Body mass index is 21 kg/m².     SUPPLEMENTAL O2:       I/O        Intake/Output Summary (Last 24 hours) at 12/2/2021 1253  Last data filed at 12/2/2021 0810  Gross per 24 hour   Intake 375 ml   Output --   Net 375 ml     I/O last 3 completed shifts: In: 360 [P.O.:350; I.V.:10]  Out: -    Patient Vitals for the past 96 hrs (Last 3 readings):   Weight   12/02/21 1045 138 lb 1.6 oz (62.6 kg)   11/29/21 1122 125 lb (56.7 kg)       Exam   Physical Exam   Constitutional: No distress on RA. Patient appears elderly. Head: Normocephalic and atraumatic. Mouth/Throat: Oropharynx is clear and moist.  No oral thrush. Eyes: Conjunctivae are normal. Pupils are equal, round. No scleral icterus. Neck: Neck supple. No tracheal deviation present. Cardiovascular: S1 and S2 with no murmur. No peripheral edema  Pulmonary/Chest: Normal effort with bilateral air entry, clear breath sounds. No stridor. No respiratory distress. Patient exhibits no tenderness. Abdominal: Soft. Bowel sounds audible. No distension or tenderness to palp. Musculoskeletal: Moves all extremities  Neurological: Patient is alert and follows simple commands      Labs  - Old records and notes have been reviewed in [facility]   ABG  No results found for: PH, PO2, PCO2, HCO3, O2SAT  No results found for: IFIO2, MODE, SETTIDVOL, SETPEEP  CBC  Recent Labs     11/30/21  0611   WBC 8.8   RBC 3.81*   HGB 9.7*   HCT 32.7*   MCV 85.8   MCH 25.5*   MCHC 29.7*      MPV 8.6*      BMP  Recent Labs     11/30/21  0611      K 4.0      CO2 22*   BUN 9   CREATININE 0.6   GLUCOSE 88   CALCIUM 8.6     LFT  No results for input(s): AST, ALT, ALB, BILITOT, ALKPHOS, LIPASE in the last 72 hours. Invalid input(s): AMYLASE  TROP  Lab Results   Component Value Date    TROPONINT < 0.010 11/29/2021     BNP  No results for input(s): BNP in the last 72 hours. Lactic Acid  No results for input(s): LACTA in the last 72 hours. INR  No results for input(s): INR, PROTIME in the last 72 hours. PTT  No results for input(s): APTT in the last 72 hours.   Glucose  No results for input(s): POCGLU in the last 72 dilated. No significantly enlarged mediastinal or  axillary lymph node. The thyroid is not enlarged. No chest wall mass. Limited evaluation below the diaphragm show that the gallbladder is surgically absent. .  Bones: The bones are demineralized. Degenerative changes of the thoracic spine. 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 Impression:  1. Again seen is the abscess collection in the right posterior pararenal space. 2. Sub-5 mm pulmonary nodules are seen. Consider a follow-up evaluation in one year. 3. Very small right pleural effusion. Bibasilar atelectasis, right greater than left. 4. Other findings as described above. CT abdomen and pelvis with IV contrast 11/29/21:  A miniscule right pleural effusion is seen. There is right lung base subsegmental atelectasis. A 3 mm right middle lobe pulmonary nodule is seen (series 2, image 10). A subpleural 5 mm right middle lobe pulmonary nodule (images 16). Another subpleural 2   mm right middle lobe pulmonary nodule (image image 20). Calcified granuloma seen in the left lung base. There is a 4 mm lingular pulmonary nodule (image 21). A 4 mm subpleural left lower lobe pulmonary nodule (image 21). Platelike atelectasis is seen in   the left lung base. The ascending thoracic aorta is ectatic at 4.1 cm. Coronary calcifications are noted. There is a 1.5 cm mildly enlarged lymph node in the right epicardial fat. There is a 9.3 x 9.5 x 3.8 cm collection with air bubbles within the in the right posterior pararenal space posterior to the right hepatic lobe. This is consistent with an abscess. The gallbladder is surgically absent. Splenule is present. Stool is seen   within the colon. Aortoiliac calcifications. There is a 9 mm calculus in the right renal sinus. Mild right urothelial thickening. No hydronephrosis  No significantly enlarged lymph nodes are seen. The bladder is grossly unremarkable. The prostate is not enlarged. Prostatic calcifications are seen. Bones:  The is on room air. Not in any distress. No old records available from [facility] so far. He was admitted to [facility] in 2016 for stone removal from bile duct. I did not see any CT chest order in Care every where in Epic. Will follow in Am for ? Old records. Blood cultures X2 sets- negative.  -Discussed with patient family including his spouse and explained in detail about current patient condition, clinical status and prognosis. All questions and concers were answered.     Marcelo Franz MD 12/2/2021 1:32 PM

## 2021-12-02 NOTE — H&P
Formulation and discussion of sedation / procedure plans, risks, benefits, side effects and alternatives with patient and/or responsible adult completed.     Electronically signed by Candelario Weaver DO on 12/2/2021 at 11:22 AM

## 2021-12-02 NOTE — PROGRESS NOTES
Patient in bed with wife at bedside. A&Ox4. Speech clear & appropriate. PERRL 5mm to 4mm. Skin, pink, warm & dry. Patient denies numbness & tingling in all extremities. Skin turgor brisk. Capillary refill less than 3 seconds bilaterally. Hand grasp strong & equal bilaterally. Negative arm drift bilaterally. Respirations unlabored. Patient denies cough. Lungs clear throughout. Patient denies shortness of breath. Pulse ox 98% on room air. Apical irregular without murmur or rub. Abdomen soft, round, patient states \"slightly tender\" non-distended. Bowel sounds active x4. Patient reports last bowel movement yesterday. Patient rates pain \"3\" on scale 1 to 10 in lower back. States \"naggy\". IV 22G infusing in RFA. No skin breakdown noted over bony prominences. Patient lower extremities pink, warm, & dry without edema. Pedal pulses strong & equal bilaterally. Pedal push & pull strong & equal bilaterally. Patient denies further needs. Call light & tray table within reach. Bed in low position & locked.  [facility]

## 2021-12-02 NOTE — CARE COORDINATION
Discharge Planning Update Note:   S/P IR drainage of abscess 12/30/21 (growing enteric G- bacilli); ID believes it would be best for indwelling drain to be placed--IR and will do CT guided drain placement 12/2/21  I spoke with Dr Leon Junior. Continue antibiotics here until discharge. He is following cx. He is planning oral antibiotics at discharge. Pulmonary consulted regarding lung nodule. Reviewing outside records. Planning home with wife. [facility] support.

## 2021-12-02 NOTE — H&P
[facility]  Sedation/Analgesia History & Physical    Pt Name: Farzaneh Shabazz  MRN: [id number]  YOB: 1950  Provider Performing Procedure: Melba Espinoza MD  Primary Care Physician: Amber James MD    PRE-PROCEDURE   DNR-CCA/DNR-CC []Yes [x]No  Brief History/Pre-Procedure Diagnosis: Retroperitoneal perihepatic abscess          MEDICAL HISTORY  []CAD/Valve  []Liver Disease  []Lung Disease []Diabetes  [x]Hypertension []Renal Disease  [x]Additional information:       has a past medical history of Cerebral artery occlusion with cerebral infarction [facility]) and Hypertension. SURGICAL HISTORY   has a past surgical history that includes joint replacement; fracture surgery; hernia repair; Cholecystectomy; Appendectomy; Colonoscopy; and CT ASP ABS HEMATOMA BULLA CYST (11/30/2021).   Additional information:       ALLERGIES   Allergies as of 11/29/2021    (No Known Allergies)     Additional information:       MEDICATIONS   Coumadin Use Last 5 Days [x]No []Yes  Antiplatelet drug therapy use last 5 days  [x]No []Yes  Other anticoagulant use last 5 days  [x]No []Yes    Current Facility-Administered Medications:     piperacillin-tazobactam (ZOSYN) 3,375 mg in dextrose 5 % 50 mL IVPB extended infusion (mini-bag), 3,375 mg, IntraVENous, Q8H, Karoline Leung MD, Last Rate: 12.5 mL/hr at 12/02/21 1052, 3,375 mg at 12/02/21 1052    sodium chloride flush 0.9 % injection 5-40 mL, 5-40 mL, IntraVENous, 2 times per day, Roselie Halstead, APRN - CNP, 10 mL at 12/01/21 0910    sodium chloride flush 0.9 % injection 5-40 mL, 5-40 mL, IntraVENous, PRN, Roselie Halstead, APRN - CNP    0.9 % sodium chloride infusion, 25 mL, IntraVENous, PRN, Roselie Halstead, APRN - CNP, Last Rate: 100 mL/hr at 11/30/21 0243, 25 mL at 11/30/21 0243    enoxaparin (LOVENOX) injection 40 mg, 40 mg, SubCUTAneous, Daily, Roselie Halstead, APRN - CNP, 40 mg at 11/30/21 2240    acetaminophen (TYLENOL) tablet 650 mg, 650 mg, Oral, Q6H PRN, 650 mg at 12/02/21 0849 **OR** acetaminophen (TYLENOL) suppository 650 mg, 650 mg, Rectal, Q6H PRN, Noe Beat, APRN - CNP    promethazine (PHENERGAN) tablet 12.5 mg, 12.5 mg, Oral, Q6H PRN **OR** ondansetron (ZOFRAN) injection 4 mg, 4 mg, IntraVENous, Q6H PRN, Noe Beat, APRN - CNP  Prior to Admission medications    Not on File     Additional information:       VITAL SIGNS   Vitals:    12/02/21 0810   BP: 120/69   Pulse: 66   Resp: 18   Temp: 97.8 °F (36.6 °C)   SpO2: 98%       PHYSICAL:   Heart:  [x]Regular rate and rhythm  []Other:    Lungs:  [x]Clear    []Other:    Abdomen: [x]Soft    []Other:    Mental Status: [x]Alert & Oriented  []Other:      PLANNED PROCEDURE   []Biospy []Arteriogram              [x]Drainage   []Mediport Insertion  []Fistulogram []IV access       []Vertebroplasty / Augmentation  []IVC filter []Dialysis catheter []Biliary drainage  []Other: []CAPD Catheter []Nephrostomy Tube / Stent  SEDATION  Planned agent:[x]Midazolam []Meperidine [x]Sublimaze []Dilaudid []Morphine     []Diazepam  []Other:     ASA Classification:  []1 [x]2 []3 []4 []5  Class 1: A normal healthy patient  Class 2: Pt with mild to moderate systemic disease  Class 3: Severe systemic disease or disturbance  Class 4: Severe systemic disorders that are already life threatening. Class 5: Moribund pt with little chances of survival, for more than 24 hours. Mallampati I Airway Classification:   []1 []2 [x]3 []4    [x]Pre-procedure diagnostic studies complete and results available. Comment:    [x]Previous sedation/anesthesia experiences assessed. Comment:    [x]The patient is an appropriate candidate to undergo the planned procedure sedation and anesthesia. (Refer to nursing sedation/analgesia documentation record)  [x]Formulation and discussion of sedation/procedure plan, risks, and expectations with patient and/or responsible adult completed. [x]Patient examined immediately prior to the procedure.  (Refer to nursing sedation/analgesia documentation record)    Kaylyn Sanchez DO, DO, MD  Electronically signed 12/2/2021 at 11:21 AM

## 2021-12-03 LAB
ANAEROBIC CULTURE: ABNORMAL
BODY FLUID CULTURE, STERILE: ABNORMAL
GRAM STAIN RESULT: ABNORMAL
ORGANISM: ABNORMAL

## 2021-12-03 PROCEDURE — 6360000002 HC RX W HCPCS: Performed by: INTERNAL MEDICINE

## 2021-12-03 PROCEDURE — 2580000003 HC RX 258: Performed by: INTERNAL MEDICINE

## 2021-12-03 PROCEDURE — 6370000000 HC RX 637 (ALT 250 FOR IP): Performed by: REGISTERED NURSE

## 2021-12-03 PROCEDURE — 2580000003 HC RX 258: Performed by: REGISTERED NURSE

## 2021-12-03 PROCEDURE — 99232 SBSQ HOSP IP/OBS MODERATE 35: CPT | Performed by: INTERNAL MEDICINE

## 2021-12-03 PROCEDURE — 6360000002 HC RX W HCPCS: Performed by: REGISTERED NURSE

## 2021-12-03 PROCEDURE — 1200000003 HC TELEMETRY R&B

## 2021-12-03 RX ADMIN — SODIUM CHLORIDE, PRESERVATIVE FREE 10 ML: 5 INJECTION INTRAVENOUS at 21:41

## 2021-12-03 RX ADMIN — ACETAMINOPHEN 650 MG: 325 TABLET ORAL at 10:50

## 2021-12-03 RX ADMIN — ACETAMINOPHEN 650 MG: 325 TABLET ORAL at 17:41

## 2021-12-03 RX ADMIN — ACETAMINOPHEN 650 MG: 325 TABLET ORAL at 04:45

## 2021-12-03 RX ADMIN — PIPERACILLIN AND TAZOBACTAM 3375 MG: 3; .375 INJECTION, POWDER, LYOPHILIZED, FOR SOLUTION INTRAVENOUS at 09:47

## 2021-12-03 RX ADMIN — ENOXAPARIN SODIUM 40 MG: 100 INJECTION SUBCUTANEOUS at 21:46

## 2021-12-03 RX ADMIN — PIPERACILLIN AND TAZOBACTAM 3375 MG: 3; .375 INJECTION, POWDER, LYOPHILIZED, FOR SOLUTION INTRAVENOUS at 17:31

## 2021-12-03 RX ADMIN — ACETAMINOPHEN 650 MG: 325 TABLET ORAL at 23:43

## 2021-12-03 RX ADMIN — PIPERACILLIN AND TAZOBACTAM 3375 MG: 3; .375 INJECTION, POWDER, LYOPHILIZED, FOR SOLUTION INTRAVENOUS at 02:29

## 2021-12-03 ASSESSMENT — PAIN SCALES - GENERAL
PAINLEVEL_OUTOF10: 3
PAINLEVEL_OUTOF10: 4
PAINLEVEL_OUTOF10: 3
PAINLEVEL_OUTOF10: 3
PAINLEVEL_OUTOF10: 4

## 2021-12-03 ASSESSMENT — PAIN DESCRIPTION - ORIENTATION: ORIENTATION: LOWER;RIGHT

## 2021-12-03 ASSESSMENT — PAIN DESCRIPTION - DESCRIPTORS: DESCRIPTORS: ACHING

## 2021-12-03 ASSESSMENT — PAIN DESCRIPTION - PAIN TYPE
TYPE: CHRONIC PAIN
TYPE: ACUTE PAIN

## 2021-12-03 ASSESSMENT — PAIN DESCRIPTION - FREQUENCY: FREQUENCY: CONTINUOUS

## 2021-12-03 ASSESSMENT — PAIN DESCRIPTION - LOCATION
LOCATION: BACK
LOCATION: BACK

## 2021-12-03 ASSESSMENT — PAIN - FUNCTIONAL ASSESSMENT: PAIN_FUNCTIONAL_ASSESSMENT: ACTIVITIES ARE NOT PREVENTED

## 2021-12-03 ASSESSMENT — PAIN DESCRIPTION - ONSET: ONSET: GRADUAL

## 2021-12-03 NOTE — PROGRESS NOTES
Physician Progress Note      PATIENTMurray Galeazzi  [facility] #:                  [id number]  :                       1950  ADMIT DATE:       2021 11:25 AM  DISCH DATE:  RESPONDING  PROVIDER #:        MICHAEL CHRISTIANSEN          QUERY TEXT:    Dr Angelica Umaña,    Pt admitted with retroperitoneal abscess. BMI 19. Pt meets Severe Malnutrition   per dietician ASPEN criteria. If possible, please document in progress notes   and discharge summary:    The medical record reflects the following:  Risk Factors: BMI 19  Clinical Indicators: ASPEN criteria: Energy Intake:  7 - 50% or less of   estimated energy requirements for 5 or more days & Weight Loss:   (Reported   10.9% loss in the last 3 months or more, no EMR weights to verify)  & Body Fat   Loss:   (Severe muscle mass loss) Orbital, Fat Overlying Ribs, Triceps &   Muscle Mass Loss:   (Severe muscle mass loss) Temples (temporalis), Clavicles   (pectoralis & deltoids), Thigh (quadraceps), Calf (gastrocnemius) & Fluid   Accumulation:  No significant fluid accumulation  Treatment: Patient will consume 75% or more of meals during LOS & ONS. Options provided:  -- Severe Malnutrition confirmed present on admission  -- Severe Malnutrition ruled out  -- Other - I will add my own diagnosis  -- Disagree - Not applicable / Not valid  -- Disagree - Clinically unable to determine / Unknown  -- Refer to Clinical Documentation Reviewer    PROVIDER RESPONSE TEXT:    The diagnosis of Severe Malnutrition was confirmed as present on admission.     Query created by: Kalani Paris on 12/3/2021 7:07 AM      Electronically signed by:  Kym Haque 12/3/2021 11:58 AM

## 2021-12-03 NOTE — PROGRESS NOTES
Patient returned from IR via bed. Wife at side. Patient alert and oriented x 4. Chinedu-close drain with bloody, pus colored drainage. See vitals for further details.

## 2021-12-03 NOTE — PROGRESS NOTES
margarito-close drain flushed with 10ml of sterile water per order. Thin bloody drainage noted. Patient tolerated well.

## 2021-12-03 NOTE — PROGRESS NOTES
Progress note: Infectious diseases    Patient - Eliberto Cooks,  Age - 66 y.o.    - 1950      Room Number - 7K-03/003-A   MRN -  [id number]   Acct # - [de-identified]  Date of Admission -  2021 11:25 AM    SUBJECTIVE:   No new complaints. Actively draining  No fever, appetite is better  OBJECTIVE   VITALS    height is 5' 8\" (1.727 m) and weight is 138 lb 1.6 oz (62.6 kg). His oral temperature is 97.7 °F (36.5 °C). His blood pressure is 113/69 and his pulse is 69. His respiration is 18 and oxygen saturation is 97%. Wt Readings from Last 3 Encounters:   21 138 lb 1.6 oz (62.6 kg)   20 135 lb 9.6 oz (61.5 kg)       I/O (24 Hours)    Intake/Output Summary (Last 24 hours) at 12/3/2021 1411  Last data filed at 12/3/2021 1020  Gross per 24 hour   Intake 1726.04 ml   Output 375 ml   Net 1351.04 ml       General Appearance  Awake, alert, oriented,  not  In acute distress  HEENT - normocephalic, atraumatic, slighlty pale conjunctiva,  anicteric sclera  Neck - Supple, no mass  Lungs -  Bilateral   air entry, no rhonchi, no wheeze  Cardiovascular - Heart sounds are normal.     Abdomen - soft, not distended, nontender, drain over the right flank area. prulent blood stained drainage noted on the bag.   Neurologic -oriented  Skin - No bruising or bleeding  Extremities - No edema, no cyanosis, clubbing     MEDICATIONS:      piperacillin-tazobactam  3,375 mg IntraVENous Q8H    sodium chloride flush  5-40 mL IntraVENous 2 times per day    enoxaparin  40 mg SubCUTAneous Daily      sodium chloride Stopped (21 1540)     sodium chloride flush, sodium chloride, acetaminophen **OR** acetaminophen, promethazine **OR** ondansetron       Problem list of patient:     Patient Active Problem List   Diagnosis Code    Abscess of abdominal cavity (HCC) K65.1    Weight loss, unintentional R63.4    Lower abdominal pain R10.30  Moderate malnutrition (HCC) E44.0    Lung nodule R91.1    Severe malnutrition (HCC) E43         ASSESSMENT/PLAN   Abscess over the right kidney /liver area: he has a drain placed  Continue iv zosyn  Will scan his abdomen on Monday depending on the cx.     Carson Todd MD, MD, FACP 12/3/2021 2:11 PM

## 2021-12-03 NOTE — PROGRESS NOTES
[place] [facility]      Patient - Bassem Ware   MRN -  [id number]   [facility] # - [de-identified]   - 1950      Date of Admission -  2021 11:25 AM  Date of evaluation -  12/3/2021  Room - --A   Hospital Day - [address], [address] Aman Schrader MD Primary Care Physician - Sofya Gotti MD     Problem List      Active Hospital Problems    Diagnosis Date Noted    Severe malnutrition [facility]) [E43] 2021     Class: Acute    Lung nodule [R91.1]     Abscess of abdominal cavity ([address] [K65.1] 2020     Reason for Consult    To follow lung nodules noted on the CT scan of abdominal chest  HPI   History Obtained From: Patient and electronic medical record. Bassem Ware is a 66 y.o. male with no significant pmhx who presents with abdominal pain over the past few months with lack of appetite. He had similar symptoms this time last year and was tx for abdominal abscess--drained by IR. He did have unintentional weight loss at that time also, which has continued this year. He reports losing 30 lbs in the past 3-4 months. He denies any chest pain, SOB, nausea, vomiting, diarrhea. His only complaint is diffuse abdominal pain and poor intake. In the emergency department CT abdomen shows 9.3X9.5X3.8cm abcess right posterior pararenal space near hepatic lobe and 5mm pulm nodules with mildly enlarged caridophrenic lymph nodes. His LFT's are ok, EKG ok. Pt is afebrile, VSS. Patient on Zosyn and Vanc due to hx of Staph and E coli grown in the abscess last year. IR to drain abscess. He is having shortness of breath: No  He denies orthopnea. He denies paroxysmal nocturnal dyspnea.       Past 24 hrs   -on RA  -Seen post procedure  -Denies SOB, CP , hemoptysis, or cough  -He underwent drainage tube placement on his right side of the upper part of abdomen to drain abscess  All other systems reviewed    PMHx   Past Medical History      Diagnosis Date    Cerebral artery occlusion with cerebral infarction [facility]     Hypertension       Past Surgical History        Procedure Laterality Date    APPENDECTOMY      CHOLECYSTECTOMY      COLONOSCOPY      CT ASP ABS HEMATOMA BULLA CYST  11/30/2021    CT ASP ABS HEMATOMA BULLA CYST 11/30/2021 [facility] CT SCAN    FRACTURE SURGERY      HERNIA REPAIR      JOINT REPLACEMENT       Meds    Current Medications    piperacillin-tazobactam  3,375 mg IntraVENous Q8H    sodium chloride flush  5-40 mL IntraVENous 2 times per day    enoxaparin  40 mg SubCUTAneous Daily     sodium chloride flush, sodium chloride, acetaminophen **OR** acetaminophen, promethazine **OR** ondansetron  IV Drips/Infusions   sodium chloride Stopped (11/30/21 1540)     Home Medications  No medications prior to admission. Diet    ADULT ORAL NUTRITION SUPPLEMENT; Breakfast, Dinner; Standard 4 oz Oral Supplement  ADULT ORAL NUTRITION SUPPLEMENT; Lunch; Frozen Oral Supplement  ADULT DIET; Regular  Allergies    Patient has no known allergies. Social History     Social History     Socioeconomic History    Marital status:      Spouse name: Not on file    Number of children: Not on file    Years of education: Not on file    Highest education level: Not on file   Occupational History    Not on file   Tobacco Use    Smoking status: Never Smoker    Smokeless tobacco: Never Used   Substance and Sexual Activity    Alcohol use: Not on file    Drug use: Not on file    Sexual activity: Not on file   Other Topics Concern    Not on file   Social History Narrative    Not on file     Social Determinants of Health     Financial Resource Strain:     Difficulty of Paying Living Expenses: Not on file   Food Insecurity:     Worried About Running Out of Food in the Last Year: Not on file    Arielle of Food in the Last Year: Not on file   Transportation Needs:     Lack of Transportation (Medical): Not on file    Lack of Transportation (Non-Medical):  Not on file   Physical Activity:     Days of Exercise per Week: Not on file    Minutes of Exercise per Session: Not on file   Stress:     Feeling of Stress : Not on file   Social Connections:     Frequency of Communication with Friends and Family: Not on file    Frequency of Social Gatherings with Friends and Family: Not on file    Attends [employer]ist Services: Not on file    Active Member of Clubs or Organizations: Not on file    Attends Club or Organization Meetings: Not on file    Marital Status: Not on file   Intimate Partner Violence:     Fear of Current or Ex-Partner: Not on file    Emotionally Abused: Not on file    Physically Abused: Not on file    Sexually Abused: Not on file   Housing Stability:     Unable to Pay for Housing in the Last Year: Not on file    Number of [place] in the Last Year: Not on file    Unstable Housing in the Last Year: Not on file     Family History    No family history on file. Sleep History    Never diagnosed with sleep apnea in the past.  Occupational history   Occupation:  He is current working: No  Type of profession: unemployed, disabled. History of tobacco smoking:No    History of recreational or IV drug use in the past:NO     History of exposure to coal mines/coal dust: NO  History of exposure to foundry dust/welding: NO  History of exposure to quarry/silica/sandblasting: NO  History of exposure to asbestos/working with breaks/ships: NO  History of exposure to farm dust: NO  History of recent travel to long distances: NO  History of exposure to birds, pigeons, or chickens in the past:NO    History of pulmonary embolism in the past: No            History of DVT in the past:No              Vitals     height is 5' 8\" (1.727 m) and weight is 138 lb 1.6 oz (62.6 kg). His oral temperature is 97.7 °F (36.5 °C). His blood pressure is 113/69 and his pulse is 69. His respiration is 18 and oxygen saturation is 97%. Body mass index is 21 kg/m².     SUPPLEMENTAL O2: I/O        Intake/Output Summary (Last 24 hours) at 12/3/2021 1555  Last data filed at 12/3/2021 1457  Gross per 24 hour   Intake 2326.04 ml   Output 500 ml   Net 1826.04 ml     I/O last 3 completed shifts: In: 2326 [P.O.:850; I.V.:80.3; IV Piggyback:1395.7]  Out: 500 [Urine:200; Drains:300]   Patient Vitals for the past 96 hrs (Last 3 readings):   Weight   12/02/21 1045 138 lb 1.6 oz (62.6 kg)       Exam   Physical Exam   Constitutional: Patient appears in no distress. Mouth/Throat: Oropharynx is clear and moist.  No oral thrush. Neck: Neck supple. Cardiovascular: S1 and S2 heard. No murmurs. Pulmonary/Chest: Bilateral air entry present. Good breath sounds on both sides, diminished at bases. No wheezes. No rales. Right-sided small bore drainage tube in place with Chinedu-Close bag. Abdominal: Soft. No tenderness. Extremities: Patient exhibits no edema. Neurological: Patient is awake and alert. Labs  - Old records and notes have been reviewed in [facility]   ABG  No results found for: PH, PO2, PCO2, HCO3, O2SAT  No results found for: IFIO2, MODE, SETTIDVOL, SETPEEP  CBC  No results for input(s): WBC, RBC, HGB, HCT, MCV, MCH, MCHC, RDW, PLT, MPV in the last 72 hours. BMP  No results for input(s): NA, K, CL, CO2, BUN, CREATININE, GLUCOSE, MG, PHOS, CALCIUM, IONCA, MG in the last 72 hours. LFT  No results for input(s): AST, ALT, ALB, BILITOT, ALKPHOS, LIPASE in the last 72 hours. Invalid input(s): AMYLASE  TROP  Lab Results   Component Value Date    TROPONINT < 0.010 11/29/2021     BNP  No results for input(s): BNP in the last 72 hours. Lactic Acid  No results for input(s): LACTA in the last 72 hours. INR  No results for input(s): INR, PROTIME in the last 72 hours. PTT  No results for input(s): APTT in the last 72 hours. Glucose  No results for input(s): POCGLU in the last 72 hours.   UA No results for input(s): SPECGRAV, [address] [facility], [address] [place] [address] 37, [address], MUCUS, [address] [facility] [address] [facility],  Kayce Duarte, BACTERIA, NITRU, GLUCOSEU, BILIRUBINUR, UROBILINOGEN, KETUA, LABCAST, LABCASTTY, AMORPHOS in the last 72 hours. Invalid input(s): CRYSTALS. PFTs   None found     Sleep studies   None found     Cultures    Blood cultures x2: no preliminary growth   COVID 19 negative     EKG     Echocardiogram      Conclusions      Summary   Ejection fraction is visually estimated at 55%. Overall left ventricular function is normal.      Signature      ----------------------------------------------------------------   Electronically signed by Susan Knox MD (Interpreting   physician) on 11/30/2021 at 07:42 PM   ----------------------------------------------------------------      Radiology    CXR  2VW CXR 11/29/21:  Patchy right lower lobe airspace opacity and small right pleural effusion suggest pneumonia in the appropriate clinical setting. Follow-up to ensure resolution is advised. CT Scans  (See actual reports for details)    CT CHEST W CONTRAST   11/30/2021   FINDINGS:  Again seen is a 9.7 x 4.2 cm abscess collection in the right posterior pararenal space just posterior to the right hepatic lobe. There is a 1 to 2 mm right upper lobe pulmonary nodule (series 2, image 15). A 4 mm right upper lobe pulmonary nodule (image 18) a 5 mm right middle lobe pulmonary nodule (image 42). A 4 mm subpleural left lower lobe pulmonary nodule (image 37). A 4 mm lingular pulmonary nodule (image 42). Subsegmental atelectasis seen in the lung bases bilaterally. Ectasia of the ascending thoracic aorta. Aortocoronary calcifications. Calcified granulomas are seen in the superior segment of the left lower lobe and in the left lung base. No large pulmonary mass. Central airway is patent. Miniscule right pleural effusion. No significant pericardial effusion. The heart is not enlarged. The main pulmonary artery is not dilated. No significantly enlarged mediastinal or  axillary lymph node. The thyroid is not enlarged.  No chest wall mass. Limited evaluation below the diaphragm show that the gallbladder is surgically absent. .  Bones: The bones are demineralized. Degenerative changes of the thoracic spine. 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 Impression:  1. Again seen is the abscess collection in the right posterior pararenal space. 2. Sub-5 mm pulmonary nodules are seen. Consider a follow-up evaluation in one year. 3. Very small right pleural effusion. Bibasilar atelectasis, right greater than left. 4. Other findings as described above. CT abdomen and pelvis with IV contrast 11/29/21:  A miniscule right pleural effusion is seen. There is right lung base subsegmental atelectasis. A 3 mm right middle lobe pulmonary nodule is seen (series 2, image 10). A subpleural 5 mm right middle lobe pulmonary nodule (images 16). Another subpleural 2   mm right middle lobe pulmonary nodule (image image 20). Calcified granuloma seen in the left lung base. There is a 4 mm lingular pulmonary nodule (image 21). A 4 mm subpleural left lower lobe pulmonary nodule (image 21). Platelike atelectasis is seen in   the left lung base. The ascending thoracic aorta is ectatic at 4.1 cm. Coronary calcifications are noted. There is a 1.5 cm mildly enlarged lymph node in the right epicardial fat. There is a 9.3 x 9.5 x 3.8 cm collection with air bubbles within the in the right posterior pararenal space posterior to the right hepatic lobe. This is consistent with an abscess. The gallbladder is surgically absent. Splenule is present. Stool is seen   within the colon. Aortoiliac calcifications. There is a 9 mm calculus in the right renal sinus. Mild right urothelial thickening. No hydronephrosis  No significantly enlarged lymph nodes are seen. The bladder is grossly unremarkable. The prostate is not enlarged. Prostatic calcifications are seen. Bones: The bones are demineralized. Degenerative changes of the thoracolumbar spine. Minimal spondylolisthesis of L5.  Left [place] to follow his lung nodules.  -Discussed with patient family including his spouse and explained in detail about current patient condition, clinical status and prognosis. All questions and concers were answered.  -We will see patient as needed more the weekend    Questions and concerns addressed.     Electronically signed by   Hans Quintanilla MD on 12/3/2021 at 3:55 PM

## 2021-12-04 PROBLEM — D64.9 CHRONIC ANEMIA: Status: ACTIVE | Noted: 2021-12-04

## 2021-12-04 LAB
ERYTHROCYTE [DISTWIDTH] IN BLOOD BY AUTOMATED COUNT: 16.5 % (ref 11.5–14.5)
ERYTHROCYTE [DISTWIDTH] IN BLOOD BY AUTOMATED COUNT: 51.8 FL (ref 35–45)
HCT VFR BLD CALC: 33.7 % (ref 42–52)
HEMOGLOBIN: 10 GM/DL (ref 14–18)
MCH RBC QN AUTO: 25.8 PG (ref 26–33)
MCHC RBC AUTO-ENTMCNC: 29.7 GM/DL (ref 32.2–35.5)
MCV RBC AUTO: 87.1 FL (ref 80–94)
PLATELET # BLD: 267 THOU/MM3 (ref 130–400)
PMV BLD AUTO: 8.7 FL (ref 9.4–12.4)
RBC # BLD: 3.87 MILL/MM3 (ref 4.7–6.1)
WBC # BLD: 3.7 THOU/MM3 (ref 4.8–10.8)

## 2021-12-04 PROCEDURE — 36415 COLL VENOUS BLD VENIPUNCTURE: CPT

## 2021-12-04 PROCEDURE — 2580000003 HC RX 258: Performed by: REGISTERED NURSE

## 2021-12-04 PROCEDURE — 6370000000 HC RX 637 (ALT 250 FOR IP): Performed by: REGISTERED NURSE

## 2021-12-04 PROCEDURE — 6360000002 HC RX W HCPCS: Performed by: INTERNAL MEDICINE

## 2021-12-04 PROCEDURE — 6360000002 HC RX W HCPCS: Performed by: REGISTERED NURSE

## 2021-12-04 PROCEDURE — 2580000003 HC RX 258: Performed by: INTERNAL MEDICINE

## 2021-12-04 PROCEDURE — 1200000003 HC TELEMETRY R&B

## 2021-12-04 PROCEDURE — 85027 COMPLETE CBC AUTOMATED: CPT

## 2021-12-04 RX ADMIN — ACETAMINOPHEN 650 MG: 325 TABLET ORAL at 12:30

## 2021-12-04 RX ADMIN — PIPERACILLIN AND TAZOBACTAM 3375 MG: 3; .375 INJECTION, POWDER, LYOPHILIZED, FOR SOLUTION INTRAVENOUS at 02:28

## 2021-12-04 RX ADMIN — ENOXAPARIN SODIUM 40 MG: 100 INJECTION SUBCUTANEOUS at 21:27

## 2021-12-04 RX ADMIN — PIPERACILLIN AND TAZOBACTAM 3375 MG: 3; .375 INJECTION, POWDER, LYOPHILIZED, FOR SOLUTION INTRAVENOUS at 18:13

## 2021-12-04 RX ADMIN — ACETAMINOPHEN 650 MG: 325 TABLET ORAL at 05:57

## 2021-12-04 RX ADMIN — SODIUM CHLORIDE, PRESERVATIVE FREE 10 ML: 5 INJECTION INTRAVENOUS at 22:15

## 2021-12-04 RX ADMIN — SODIUM CHLORIDE, PRESERVATIVE FREE 10 ML: 5 INJECTION INTRAVENOUS at 08:34

## 2021-12-04 RX ADMIN — ACETAMINOPHEN 650 MG: 325 TABLET ORAL at 18:14

## 2021-12-04 RX ADMIN — PIPERACILLIN AND TAZOBACTAM 3375 MG: 3; .375 INJECTION, POWDER, LYOPHILIZED, FOR SOLUTION INTRAVENOUS at 10:49

## 2021-12-04 ASSESSMENT — PAIN SCALES - GENERAL
PAINLEVEL_OUTOF10: 3
PAINLEVEL_OUTOF10: 4
PAINLEVEL_OUTOF10: 0
PAINLEVEL_OUTOF10: 3
PAINLEVEL_OUTOF10: 4

## 2021-12-04 ASSESSMENT — PAIN DESCRIPTION - PAIN TYPE
TYPE_2: ACUTE PAIN
TYPE: CHRONIC PAIN
TYPE: ACUTE PAIN;SURGICAL PAIN

## 2021-12-04 ASSESSMENT — PAIN DESCRIPTION - LOCATION
LOCATION: BACK
LOCATION: FLANK
LOCATION_2: OTHER (COMMENT)

## 2021-12-04 ASSESSMENT — PAIN DESCRIPTION - DESCRIPTORS
DESCRIPTORS_2: ACHING
DESCRIPTORS: ACHING
DESCRIPTORS: ACHING

## 2021-12-04 ASSESSMENT — PAIN DESCRIPTION - PROGRESSION
CLINICAL_PROGRESSION: NOT CHANGED
CLINICAL_PROGRESSION_2: NOT CHANGED

## 2021-12-04 ASSESSMENT — PAIN DESCRIPTION - FREQUENCY
FREQUENCY: CONTINUOUS
FREQUENCY: CONTINUOUS

## 2021-12-04 ASSESSMENT — PAIN DESCRIPTION - ORIENTATION
ORIENTATION: RIGHT
ORIENTATION_2: RIGHT

## 2021-12-04 ASSESSMENT — PAIN DESCRIPTION - ONSET
ONSET: ON-GOING
ONSET_2: ON-GOING
ONSET: ON-GOING

## 2021-12-04 ASSESSMENT — PAIN DESCRIPTION - INTENSITY: RATING_2: 4

## 2021-12-04 ASSESSMENT — PAIN DESCRIPTION - DURATION: DURATION_2: CONTINUOUS

## 2021-12-04 NOTE — FLOWSHEET NOTE
[place] [address] PROGRESS NOTE      Patient: Brittnee Gonsalves  Room #: 7K-03/003-A            YOB: 1950  Age: 66 y.o. Gender: male            Admit Date & Time: 11/29/2021 11:25 AM    Assessment:  Pt was sitting up in bed. Pt's wife of 48 years was present. Pt is Quaker. Pt has 4 children, 32 grandchildren and 4 great-grandchildren with another on the way. Pt shared that his condition has improved since he arrived, but they still do not know the cause for the abscess. Pt and his wife shared about their beliefs and their life living as Quaker in [address] [place] [address]. Pt's wife shared about the loss of a 12year old grandson recently due to a traffic accident. Interventions:   provided a listening and supportive presence.  encouraged the pt and his wife to share about their john and their understanding of the pt's health. Outcomes:  Pt and his wife expressed gratitude for spending time with them. Plan:  1. Provide spiritual care and support  2.  Reassess pt and his family if stay becomes extended. Electronically signed by Cornel Thomas, on 12/4/2021 at 2:03 PM.  3 [facility]  [phone]       12/04/21 1245   Encounter Summary   Services provided to: Patient and family together   Referral/Consult From: [address]; Children; Family members; Anabaptist/john community   Place of [facility]   (Quaker)   Continue Visiting Yes  (12/4)   Complexity of Encounter High   Length of Encounter 1 hour   Spiritual Assessment Completed Yes   Spiritual/Denominational   Type Spiritual support   Assessment Calm; Approachable   Intervention Active listening; Explored feelings, thoughts, concerns; Explored coping resources; Scripture; Sustaining presence/ Ministry of presence;  Discussed relationship with God; Discussed belief system/Yarsani practices/john   Outcome Connection/belonging; Comfort; Engaged in conversation;  Shared life review; Expressed feelings/needs/concerns

## 2021-12-04 NOTE — PROGRESS NOTES
Gastroenterology  Progress Note    12/4/2021 2:57 PM  Subjective:   Admit Date: 11/29/2021    Interval History: Patient with significant weight loss retroperitoneal abscess behind the liver unlikely to be GI related musculoskeletal more than 10 years ago that repeated outpatient or inpatient stay longer dependent complication the drain for the abscess has blood material CBC repeated H&H stable  Diet: ADULT ORAL NUTRITION SUPPLEMENT; Breakfast, Dinner; Standard 4 oz Oral Supplement  ADULT ORAL NUTRITION SUPPLEMENT; Lunch; Frozen Oral Supplement  ADULT DIET; Regular    Medications:   Scheduled Meds:    piperacillin-tazobactam  3,375 mg IntraVENous Q8H    sodium chloride flush  5-40 mL IntraVENous 2 times per day    enoxaparin  40 mg SubCUTAneous Daily     Continuous Infusions:    sodium chloride Stopped (11/30/21 1540)       CBC:   Recent Labs     12/04/21  0540   WBC 3.7*   HGB 10.0*        BMP:  No results for input(s): NA, K, CL, CO2, BUN, CREATININE, GLUCOSE in the last 72 hours. Hepatic: No results for input(s): AST, ALT, ALB, BILITOT, ALKPHOS in the last 72 hours. INR: No results for input(s): INR in the last 72 hours. Imaging:  No results found for this or any previous visit. Results for orders placed during the hospital encounter of 11/29/21    CT ABDOMEN PELVIS W IV CONTRAST Additional Contrast? None    Narrative  PROCEDURE: CT ABDOMEN PELVIS W IV CONTRAST    CLINICAL INFORMATION: abdominal pain, h/o abdominal abscess drained 6 months ago    COMPARISON: CT abdomen and pelvis 12/4/2020    TECHNIQUE:  5 mm axial imaging through the abdomen and pelvis with IV contrast.  Coronal and sagittal reconstructions were performed. All CT scans at this facility use dose modulation, iterative reconstruction, and/or weight based dosing when appropriate to reduce the radiation dose to as low as reasonably achievable.     CONTRAST: 80 mL of Isovue-370      FINDINGS:      A miniscule right pleural effusion is seen. There is right lung base subsegmental atelectasis. A 3 mm right middle lobe pulmonary nodule is seen (series 2, image 10). A subpleural 5 mm right middle lobe pulmonary nodule (images 16). Another subpleural 2  mm right middle lobe pulmonary nodule (image image 20). Calcified granuloma seen in the left lung base. There is a 4 mm lingular pulmonary nodule (image 21). A 4 mm subpleural left lower lobe pulmonary nodule (image 21). Platelike atelectasis is seen in  the left lung base. The ascending thoracic aorta is ectatic at 4.1 cm. Coronary calcifications are noted. There is a 1.5 cm mildly enlarged lymph node in the right epicardial fat. There is a 9.3 x 9.5 x 3.8 cm collection with air bubbles within the in the right posterior pararenal space posterior to the right hepatic lobe. This is consistent with an abscess. The gallbladder is surgically absent. Splenule is present. Stool is seen  within the colon. Aortoiliac calcifications. There is a 9 mm calculus in the right renal sinus. Mild right urothelial thickening. No hydronephrosis    Otherwise, the liver, biliary tree, pancreas, adrenal glands, and spleen are unremarkable. No bowel obstruction or acute inflammatory bowel process. The abdominal aorta is not aneurysmal.    No significantly enlarged lymph nodes are seen. The bladder is grossly unremarkable. The prostate is not enlarged. Prostatic calcifications are seen. Bones: The bones are demineralized. Degenerative changes of the thoracolumbar spine. Minimal spondylolisthesis of L5. Left hip arthroplasty has been performed. Intramedullary ruth ann and screw fixation of the right femur is seen. Impression  1. There is a 9.3 x 9.5 x 3.8 cm abscess in the right posterior pararenal space just posterior to the right hepatic lobe. 2. Sub-5 mm pulmonary nodules are seen. 3. 9 mm right renal sinus calculus. Mild right urothelial thickening is seen.   4. Mildly enlarged right cardiophrenic lymph node. 5. Other incidental findings as described above. **This report has been created using voice recognition software. It may contain minor errors which are inherent in voice recognition technology. **    Final report electronically signed by Dr Michelle Singh on 11/29/2021 1:57 PM    No results found for this or any previous visit. No results found for this or any previous visit. Endoscopy Finding:      Objective:   Vitals: /60   Pulse 73   Temp 97.8 °F (36.6 °C) (Oral)   Resp 16   Ht 5' 8\" (1.727 m)   Wt 138 lb 1.6 oz (62.6 kg)   SpO2 98%   BMI 21.00 kg/m²     Intake/Output Summary (Last 24 hours) at 12/4/2021 1457  Last data filed at 12/4/2021 1236  Gross per 24 hour   Intake 776.24 ml   Output 25 ml   Net 751.24 ml     General appearance: alert and cooperative with exam  Lungs: clear to auscultation bilaterally  Heart: regular rate and rhythm, S1, S2 normal, no murmur, click, rub or gallop  Abdomen: soft, non-tender; bowel sounds normal; no masses,  no organomegaly  Extremities: extremities normal, atraumatic, no cyanosis or edema    Assessment and Plan:   1.  Right retroperitoneal abscess on antibiotic possibly to be GI related will need colonoscopy 1 more stable after a longer course of antibiotics can be done in outpatient      Follow up in GI Clinic after discharge in 2 weeks week(s)    Patient Active Problem List:     Abscess of abdominal cavity (HCC)     Weight loss, unintentional     Lower abdominal pain     Moderate malnutrition ([address]     Lung nodule     Severe malnutrition ([address]      Electronically signed by Pari Melgoza MD on 12/4/2021 at 2:57 PM

## 2021-12-04 NOTE — PROGRESS NOTES
Progress note      [facility]             Patient:  Riky Lucas  YOB: 1950    MRN: [id number]   Acct:  [de-identified]   [id number]  Primary Care Physician: Linette Boswell MD    Admit Date: 11/29/2021           Subjective:  Has no complaints, abdominal pain improved a now eating much better    Objective:      Physical Exam:    Vitals:  Patient Vitals for the past 24 hrs:   BP Temp Temp src Pulse Resp SpO2   12/03/21 2131 117/69 97.5 °F (36.4 °C) Oral 68 18 97 %   12/03/21 1617 117/71 98 °F (36.7 °C) Oral 72 18 99 %   12/03/21 1312 113/69 97.7 °F (36.5 °C) Oral 69 18 97 %   12/03/21 0811 (!) 147/82 98.7 °F (37.1 °C) Oral 78 16 98 %   12/03/21 0414 (!) 146/77 97.6 °F (36.4 °C) Oral 69 16 99 %     Weight: Weight: 138 lb 1.6 oz (62.6 kg)     24 hour intake/output:    Intake/Output Summary (Last 24 hours) at 12/3/2021 2236  Last data filed at 12/3/2021 2120  Gross per 24 hour   Intake 2326.04 ml   Output 500 ml   Net 1826.04 ml       General appearance - alert, well appearing, and in no distress  Eyes - pupils equal and reactive, extraocular eye movements intact  Mouth - mucous membranes moist, pharynx normal without lesions  Neck - supple, no significant adenopathy  Chest - clear to auscultation, no wheezes, rales or rhonchi, symmetric air entry  Heart - normal rate, regular rhythm, normal S1, S2, no murmurs, rubs, clicks or gallops  Abdomen - soft,  Mildly tender to palpation RUQ, nondistended, no masses or organomegaly, pos bs.  Drain in place right flank  Neurological - alert, oriented, normal speech, no focal findings or movement disorder noted  Musculoskeletal - no joint tenderness, deformity or swelling  Extremities - peripheral pulses normal, no pedal edema, no clubbing or cyanosis  Skin - normal coloration and turgor, no rashes, no suspicious skin lesions noted    Review of Labs and Diagnostic Testing:    CBC:   No results for input(s): WBC, HGB, HCT, MCV, PLT in the last 72 hours. BMP:   No results for input(s): NA, K, CL, CO2, PHOS, BUN, CREATININE, CALCIUM, GLUCOSE in the last 72 hours. PT/INR: No results for input(s): PROTIME, INR in the last 72 hours. APTT: No results for input(s): APTT in the last 72 hours. Lipids:   No results for input(s): ALKPHOS, ALT, AST, BILITOT, BILIDIR, LABALBU, AMYLASE, LIPASE in the last 72 hours. Troponin:   No results for input(s): TROPONINT in the last 72 hours. Imaging:  [unfilled]    EKG:      Diet: ADULT ORAL NUTRITION SUPPLEMENT; Breakfast, Dinner; Standard 4 oz Oral Supplement  ADULT ORAL NUTRITION SUPPLEMENT; Lunch; Frozen Oral Supplement  ADULT DIET; Regular        Data:   Scheduled Meds: Scheduled Meds:   piperacillin-tazobactam  3,375 mg IntraVENous Q8H    sodium chloride flush  5-40 mL IntraVENous 2 times per day    enoxaparin  40 mg SubCUTAneous Daily     Continuous Infusions:   sodium chloride Stopped (11/30/21 1540)     PRN Meds:.sodium chloride flush, sodium chloride, acetaminophen **OR** acetaminophen, promethazine **OR** ondansetron  Continuous Infusions:   sodium chloride Stopped (11/30/21 1540)         Assessment/Plan   1. Intra-abdominal abscess  -S/P IR drainage of abscess and also CT guided drain in place (growing E. Coli sensitive to zosyn);-   Will have GI  service see on account of recurrent intra-abdominal abscess. Case had been discussed with general surgery who felt lung all indication for General surgery at this point. -ID following  -Cont Zosyn and Vanc  -Echo WNL; no signs of endocarditis  - plan repeat CT scan abdomen and pelvis early next week     2. Pulmonary nodules   -Pulmonary following; CT chest showed sub nodules 5mm recommend follow up in 1 year     3. Weight loss and poor appetite  - oral intake has improved  -CEA WNL    4.  DVT prophylaxis   -Lovenox     Dr. Jose Martinez covers 12/3 -  12/06/2021        Electronically signed by [facility] Jillian Guillaume MD on 12/3/2021 at 10:36 PM

## 2021-12-04 NOTE — PROGRESS NOTES
Progress Note    Patient:  Emeli Camacho      Unit/Bed:7K-03/003-A    YOB: 1950    MRN: [id number]     Acct: [de-identified]     Admit date: 11/29/2021    The patient is a 66 y.o. male  who was admitted with abdominal pain over the past few months with lack of appetite. In the emergency department CT abdomen shows 9.3X9.5X3.8cm abcess right posterior pararenal space near hepatic lobe and 5mm pulm nodules with mildly enlarged caridophrenic lymph nodes. He was started on antibiotics including Zosyn and vancomycin based on cultures from a similar abscess a year ago. The patient is status post CT-guided drainage of the abscess, with drain placement. .  Cultures have been reviewed. He has been seen by GI with plans for colonoscopy. The patient has been seen by pulmonary for evaluation of lung nodules. The patient reports improvement of pain, down to 3 out of 10 in intensity. It is right-sided with no radiation. Is not associated with nausea or vomiting. His appetite is improving. He denies fevers and chills. He has had 3 bowel movements. Labs are notable for anemia and leukopenia. Past Medical History:   Diagnosis Date    Cerebral artery occlusion with cerebral infarction [facility])     Chronic anemia 12/4/2021    Hypertension      No family history on file.   Social History     Socioeconomic History    Marital status:      Spouse name: Not on file    Number of children: Not on file    Years of education: Not on file    Highest education level: Not on file   Occupational History    Not on file   Tobacco Use    Smoking status: Never Smoker    Smokeless tobacco: Never Used   Substance and Sexual Activity    Alcohol use: Not on file    Drug use: Not on file    Sexual activity: Not on file   Other Topics Concern    Not on file   Social History Narrative    Not on file     Social Determinants of Health     Financial Resource Strain:     Difficulty of Paying Living Expenses: Not on the last 72 hours. Ionized Calcium:No results for input(s): IONCA in the last 72 hours. Magnesium:No results for input(s): MG in the last 72 hours. Phosphorus:No results for input(s): PHOS in the last 72 hours. BNP:No results for input(s): BNP in the last 72 hours. Glucose:No results for input(s): POCGLU in the last 72 hours. HgbA1C: No results for input(s): LABA1C in the last 72 hours. INR: No results for input(s): INR in the last 72 hours. Hepatic: No results for input(s): ALKPHOS, ALT, AST, PROT, BILITOT, BILIDIR, LABALBU in the last 72 hours. Amylase and Lipase:No results for input(s): LACTA, AMYLASE in the last 72 hours. Lactic Acid: No results for input(s): LACTA in the last 72 hours. Troponin: No results for input(s): CKTOTAL, CKMB, TROPONINT in the last 72 hours. BNP: No results for input(s): BNP in the last 72 hours. Lipids: No results for input(s): CHOL, TRIG, HDL, LDLCALC in the last 72 hours. Invalid input(s): LDL  ABGs: No results found for: PH, PCO2, PO2, HCO3, O2SAT    Radiology reports as per the Radiologist  Radiology: ECHO Complete 2D W Doppler W Color    Result Date: 11/30/2021  Transthoracic Echocardiography Report (TTE)  Demographics   Patient Name    Hernando Duval Gender                Male   MR #            [id number]   Race                                                 Ethnicity   Account #       [de-identified]   Room Number           0003   Accession       [id number]  Date of Study         11/30/2021  Number   Date of Birth   1950  Referring Physician   Tania Leiva MD   Age             66 year(s)  [facility] [address] [facility]                               Interpreting          Echo reader of the week                              Physician             Ino Wayne MD  Procedure Type of Study   TTE procedure:ECHOCARDIOGRAM COMPLETE 2D W DOPPLER W COLOR.   Procedure Date Date: 11/30/2021 Start: 03:20 PM Study Location: Bedside Technical Quality: Poor visualization due to lung interface. Indications:Rule out endocarditis. Additional Medical History:Hypertension. Patient Status: Routine Height: 68 inches Weight: 125 pounds BSA: 1.67 m^2 BMI: 19.01 kg/m^2 BP: 113/66 mmHg  Conclusions   Summary  Ejection fraction is visually estimated at 55%. Overall left ventricular function is normal.   Signature   ----------------------------------------------------------------  Electronically signed by Rosa Maria Amos MD (Interpreting  physician) on 11/30/2021 at 07:42 PM  ----------------------------------------------------------------   Findings   Mitral Valve  The mitral valve structure was normal with normal leaflet separation. DOPPLER: The transmitral velocity was within the normal range with no  evidence for mitral stenosis. There was no evidence of mitral  regurgitation. Aortic Valve  The aortic valve was trileaflet with normal thickness and cuspal  separation. DOPPLER: Transaortic velocity was within the normal range with  no evidence of aortic stenosis. There was no evidence of aortic  regurgitation. Tricuspid Valve  Trivial tricuspid regurgitation visualized. Pulmonic Valve  The pulmonic valve was not well visualized . Trivial pulmonic regurgitation visualized. Left Atrium  Left atrial size was normal.   Left Ventricle  Ejection fraction is visually estimated at 55%. Overall left ventricular function is normal.   Right Atrium  Right atrial size was normal.   Right Ventricle  The right ventricular size was normal with normal systolic function and  wall thickness. Pericardial Effusion  The pericardium was normal in appearance with no evidence of a pericardial  effusion. Pleural Effusion  No evidence of pleural effusion. Aorta / Great Vessels  -Aortic root dimension within normal limits.  -The Pulmonary artery is within normal limits. -IVC size is within normal limits with normal respiratory phasic changes.   M-Mode/2D Measurements & Calculations   LV Diastolic    LV Systolic Dimension: 3.1  AV Cusp Separation: 1.8 cmLA  Dimension: 4.5  cm                          Dimension: 4.5 cmAO Root  cm              LV Volume Diastolic: 86.9   Dimension: 3.6 cm  LV FS:31.1 %    ml  LV PW           LV Volume Systolic: 20.7 ml  Diastolic: 1 cm LV EDV/LV EDV Index: 92.4  Septum          ml/55 m^2LV ESV/LV ESV      RV Diastolic Dimension: 1.7 cm  Diastolic: 0.8  Index: 36.5 ml/23 m^2  cm              EF Calculated: 59 %         LA/Aorta: 1.25  Doppler Measurements & Calculations   MV Peak E-Wave: 64.3 cm/s  AV Peak Velocity:     LVOT Peak Velocity: 75.4  MV Peak A-Wave: 48 cm/s    95.3 cm/s             cm/s  MV E/A Ratio: 1.34         AV Peak Gradient:     LVOT Peak Gradient: 2  MV Peak Gradient: 1.65     3.63 mmHg             mmHg  mmHg                                                   TV Peak E-Wave: 51.8 cm/s  MV Deceleration Time: 229                        TV Peak A-Wave: 47.1 cm/s  msec  MV P1/2t: 67 msec                                TV Peak Gradient: 1.07  MVA by PHT:3.28 cm^2                             mmHg                                                   TR Velocity:246 cm/s  MV E' Septal Velocity: 6.9 AV DVI (Vmax):0.79    TR Gradient:24.21 mmHg  cm/s                                             PV Peak Velocity: 87.8  MV A' Septal Velocity: 8.3                       cm/s  cm/s                                             PV Peak Gradient: 3.08  MV E' Lateral Velocity:                          mmHg  6.7 cm/s  MV A' Lateral Velocity:  11.1 cm/s  E/E' septal: 9.32  E/E' lateral: 9.6  [email or web address] [id number]    XR CHEST (2 VW)    Result Date: 11/29/2021  PROCEDURE: XR CHEST (2 VW) CLINICAL INFORMATION: Abdominal pain. COMPARISON: No prior study. TECHNIQUE: PA and lateral views of the chest performed. FINDINGS: Lines/tubes: None. Heart/mediastinum: The heart size is normal. The pulmonary vascularity is unremarkable. Lungs: Patchy right lower lung airspace opacities are observed. A small right pleural effusion is identified. The right hemidiaphragm is elevated. The lungs are hyperinflated. The left lung appears clear. No pneumothorax is observed. Bones: Diffuse osteopenia is present. The visualized skeletal structures appear intact. Patchy right lower lobe airspace opacity and small right pleural effusion suggest pneumonia in the appropriate clinical setting. Follow-up to ensure resolution is advised. **This report has been created using voice recognition software. It may contain minor errors which are inherent in voice recognition technology. ** Final report electronically signed by Dr Candida Melendrez on 11/29/2021 1:00 PM    CT CHEST W CONTRAST    Result Date: 11/30/2021  PROCEDURE: CT CHEST W CONTRAST CLINICAL INFORMATION: lung nodules COMPARISON: CT abdomen and pelvis 11/29/2021 TECHNIQUE: 5 mm axial imaging through the chest with IV contrast. Coronal and sagittal reconstruction were performed. All CT scans at this facility use dose modulation, iterative reconstruction, and/or weight based dosing when appropriate to reduce the radiation dose to as low as reasonably achievable. CONTRAST: 80 mL of Isovue-370 FINDINGS: Again seen is a 9.7 x 4.2 cm abscess collection in the right posterior pararenal space just posterior to the right hepatic lobe. There is a 1 to 2 mm right upper lobe pulmonary nodule (series 2, image 15). A 4 mm right upper lobe pulmonary nodule (image 18) a 5 mm right middle lobe pulmonary nodule (image 42). A 4 mm subpleural left lower lobe pulmonary nodule (image 37). A 4 mm lingular pulmonary nodule (image 42). Subsegmental atelectasis seen in the lung bases bilaterally. Ectasia of the ascending thoracic aorta. Aortocoronary calcifications.  Calcified granulomas are seen in the superior segment of the left lower lobe and in the left lung base. No large pulmonary mass. Central airway is patent. Miniscule right pleural effusion. No significant pericardial effusion. The heart is not enlarged. The main pulmonary artery is not dilated. No significantly enlarged mediastinal or  axillary lymph node. The thyroid is not enlarged. No chest wall mass. Limited evaluation below the diaphragm show that the gallbladder is surgically absent. . Bones: The bones are demineralized. Degenerative changes of the thoracic spine. 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 1. Again seen is the abscess collection in the right posterior pararenal space. 2. Sub-5 mm pulmonary nodules are seen. Consider a follow-up evaluation in one year. 3. Very small right pleural effusion. Bibasilar atelectasis, right greater than left. 4. Other findings as described above. **This report has been created using voice recognition software. It may contain minor errors which are inherent in voice recognition technology. ** Final report electronically signed by Dr Suly Ramirez on 11/30/2021 5:24 PM    CT GUIDED NEEDLE PLACEMENT    Result Date: 11/30/2021  PROCEDURE: CT ASP ABS HEMATOMA BULLA CYST, CT GUIDED NEEDLE PLACEMENT CLINICAL INFORMATION: Abscess right post pararenal space near hepatic lobe, aspiration . COMPARISON: CT abdomen pelvis dated 11/29/2021. TECHNIQUE: Risks and benefits of the procedure were thoroughly explained and oral and written informed consent obtained. The patient was placed in a left lateral decubitus position on the CT table and a suitable puncture site at an appropriate right intercostal space was designated using CT guidance. A timeout procedure was performed and the patient was prepped and draped in usual fashion. 1% lidocaine was infiltrated in the subcutis tissues at the designated puncture site and a 5 Western Brionna needle/catheter system was directed into the fluid collection posterior to the liver.  The inner needle was removed and approximately 200 mL purulent tan fluid was aspirated with approximately 40 mL sent to the laboratory for further analysis. The catheter was then removed and a bandage placed. The patient tolerated the procedure well and no immediate postprocedural complication was identified. Dose (mGycm: 282 for ct chest and 518 for aspiration Medication Utilized: / Total Length of Sedation: / Physician Performing Procedure: dr Hamilton Hassan with Procedure: colby  All CT scans at this facility use dose modulation, iterative reconstruction, and/or weight-based dosing when appropriate to reduce radiation dose to as low as reasonably achievable. FINDINGS: Preprocedure image shows prominent collection posterior to the liver measuring 8.6 x 4.3 cm in greatest axial dimensions. Intraprocedural CT images show catheter within the collection posterior to the liver. Post procedure images show the collection decreased in size compared to preprocedure images measuring approximate 7.4 x 3 cm in greatest axial dimensions. Successful CT-guided aspiration of retroperitoneal perihepatic abscess. **This report has been created using voice recognition software. It may contain minor errors which are inherent in voice recognition technology. ** Final report electronically signed by Dr. Nelly Rios MD on 11/30/2021 4:08 PM    CT ABDOMEN PELVIS W IV CONTRAST Additional Contrast? None    Result Date: 11/29/2021  PROCEDURE: CT ABDOMEN PELVIS W IV CONTRAST CLINICAL INFORMATION: abdominal pain, h/o abdominal abscess drained 6 months ago COMPARISON: CT abdomen and pelvis 12/4/2020 TECHNIQUE: 5 mm axial imaging through the abdomen and pelvis with IV contrast.  Coronal and sagittal reconstructions were performed. All CT scans at this facility use dose modulation, iterative reconstruction, and/or weight based dosing when appropriate to reduce the radiation dose to as low as reasonably achievable.  CONTRAST: 80 mL of Isovue-370 FINDINGS: A miniscule right pleural effusion is seen. There is right lung base subsegmental atelectasis. A 3 mm right middle lobe pulmonary nodule is seen (series 2, image 10). A subpleural 5 mm right middle lobe pulmonary nodule (images 16). Another subpleural 2 mm right middle lobe pulmonary nodule (image image 20). Calcified granuloma seen in the left lung base. There is a 4 mm lingular pulmonary nodule (image 21). A 4 mm subpleural left lower lobe pulmonary nodule (image 21). Platelike atelectasis is seen in the left lung base. The ascending thoracic aorta is ectatic at 4.1 cm. Coronary calcifications are noted. There is a 1.5 cm mildly enlarged lymph node in the right epicardial fat. There is a 9.3 x 9.5 x 3.8 cm collection with air bubbles within the in the right posterior pararenal space posterior to the right hepatic lobe. This is consistent with an abscess. The gallbladder is surgically absent. Splenule is present. Stool is seen within the colon. Aortoiliac calcifications. There is a 9 mm calculus in the right renal sinus. Mild right urothelial thickening. No hydronephrosis Otherwise, the liver, biliary tree, pancreas, adrenal glands, and spleen are unremarkable. No bowel obstruction or acute inflammatory bowel process. The abdominal aorta is not aneurysmal. No significantly enlarged lymph nodes are seen. The bladder is grossly unremarkable. The prostate is not enlarged. Prostatic calcifications are seen. Bones: The bones are demineralized. Degenerative changes of the thoracolumbar spine. Minimal spondylolisthesis of L5. Left hip arthroplasty has been performed. Intramedullary ruth ann and screw fixation of the right femur is seen. 1. There is a 9.3 x 9.5 x 3.8 cm abscess in the right posterior pararenal space just posterior to the right hepatic lobe. 2. Sub-5 mm pulmonary nodules are seen. 3. 9 mm right renal sinus calculus. Mild right urothelial thickening is seen. 4. Mildly enlarged right cardiophrenic lymph node.  5. Other incidental findings as described above. **This report has been created using voice recognition software. It may contain minor errors which are inherent in voice recognition technology. ** Final report electronically signed by Dr Stephanie Sheldon on 11/29/2021 1:57 PM    CT ASP ABS HEMATOMA BULLA CYST    Result Date: 11/30/2021  PROCEDURE: CT ASP ABS HEMATOMA BULLA CYST, CT GUIDED NEEDLE PLACEMENT CLINICAL INFORMATION: Abscess right post pararenal space near hepatic lobe, aspiration . COMPARISON: CT abdomen pelvis dated 11/29/2021. TECHNIQUE: Risks and benefits of the procedure were thoroughly explained and oral and written informed consent obtained. The patient was placed in a left lateral decubitus position on the CT table and a suitable puncture site at an appropriate right intercostal space was designated using CT guidance. A timeout procedure was performed and the patient was prepped and draped in usual fashion. 1% lidocaine was infiltrated in the subcutis tissues at the designated puncture site and a 5 Western Brionna needle/catheter system was directed into the fluid collection posterior to the liver. The inner needle was removed and approximately 200 mL purulent tan fluid was aspirated with approximately 40 mL sent to the laboratory for further analysis. The catheter was then removed and a bandage placed. The patient tolerated the procedure well and no immediate postprocedural complication was identified. Dose (mGycm: 282 for ct chest and 518 for aspiration Medication Utilized: / Total Length of Sedation: / Physician Performing Procedure: dr Hamilton Hassan with Procedure: colby  All CT scans at this facility use dose modulation, iterative reconstruction, and/or weight-based dosing when appropriate to reduce radiation dose to as low as reasonably achievable. FINDINGS: Preprocedure image shows prominent collection posterior to the liver measuring 8.6 x 4.3 cm in greatest axial dimensions.  Intraprocedural CT images show catheter within the collection posterior to the liver. Post procedure images show the collection decreased in size compared to preprocedure images measuring approximate 7.4 x 3 cm in greatest axial dimensions. Successful CT-guided aspiration of retroperitoneal perihepatic abscess. **This report has been created using voice recognition software. It may contain minor errors which are inherent in voice recognition technology. ** Final report electronically signed by Dr. Fernand Favre, MD on 11/30/2021 4:08 PM       Physical Exam:  Vitals: /60   Pulse 73   Temp 97.8 °F (36.6 °C) (Oral)   Resp 16   Ht 5' 8\" (1.727 m)   Wt 138 lb 1.6 oz (62.6 kg)   SpO2 98%   BMI 21.00 kg/m²   24 hour intake/output:    Intake/Output Summary (Last 24 hours) at 12/4/2021 1829  Last data filed at 12/4/2021 1236  Gross per 24 hour   Intake 776.24 ml   Output 25 ml   Net 751.24 ml     Last 3 weights: Wt Readings from Last 3 Encounters:   12/02/21 138 lb 1.6 oz (62.6 kg)   11/30/20 135 lb 9.6 oz (61.5 kg)       General appearance - alert, well appearing, and in no distress  HEENT: Normocephalic, Atraumatic, Conjuctiva pink and PERRL  Chest - clear to auscultation, no wheezes, rales or rhonchi, symmetric air entry  Cardiovascular - normal rate, regular rhythm, normal S1, S2, no murmurs, rubs, clicks or gallops  Abdomen -mild right upper quadrant tenderness. Drain in place. Bowel sounds present.   Neurological - Alert and oriented, Normal speech, No focal findings or movement disorder noted and Motor and sensory grossly normal bilaterally  Integumentary - Skin color, texture, turgor normal. No Rashes or lesions  Musculoskeletal -Full ROM times 4 extremities, No clubbing or cyanosis and No peripheral edema      DVT prophylaxis: [x] Lovenox                                 [] SCDs                                 [] SQ Heparin                                 [] Encourage ambulation           [] Already on Anticoagulation Assessment:    Active Problems:    Abscess of abdominal cavity (HCC)    Severe malnutrition (HCC)    Chronic anemia    Lung nodule  Resolved Problems:    * No resolved hospital problems. *          Plan:  -Continue antibiotics. IV fluids as needed. Patient is slowly improving.  -I agree with plans for colonoscopy. He has been seen by GI.  -Nutritional support.  -Nutritional support. Obtain A55 and folic acid levels. Also check iron status.  -Seen by pulmonary for lung nodules.   Electronically signed by Gerhardt Amato, MD on 12/4/2021 at 6:29 PM    Rounding Hospitalist

## 2021-12-04 NOTE — CONSULTS
No drug abuse. He has good family  support. FAMILY HISTORY:  Sister with cancer. He is not sure of the origin of  it. Was spent at the time of diagnosis. PHYSICAL EXAMINATION:  GENERAL:  He appears slightly pale. Not short of breath. Not using  accessory muscles. He is comfortable. VITAL SIGNS:  His weight is 138. His temperature is 97.5, his blood  pressure 117/79, respirations 18. HEENT:  His head is atraumatic. Sclerae anicteric. His oral cavity, no  lesions seen. NECK:  Supple. CHEST:  Normal.  CARDIOVASCULAR:  Normal.  ABDOMEN:  Soft. No tenderness. No rebound. No guarding. No  organomegaly. No stigmata of liver disease. EXTREMITIES:  No clubbing. No cyanosis. MUSCULOSKELETAL:  Grossly normal.  PSYCHOLOGICAL:  Grossly normal.    LABORATORY DATA:  His sodium and potassium are normal.  BUN and  creatinine are normal.  His ALT and AST are normal.  His white blood  cell 8.8, His H and H today are 9.7 and 32.2,  MCV 85.8, platelets  within normal range. His CEA is 1.2, within normal range. His blood culture for abscess is  pending. It was drained by Interventional Radiology. IMAGING STUDY:  CT of the abdomen showed 9.3 x 9.5 x 3.8 cm abscess in  the right posterior pararenal space just pushing the right hepatic lobe. Also 5 mm pulmonary nodule seen, 9 mm right renal sinus calculus,  enlarged right cardiophrenic lymph node. IMPRESSION:  1. Posterior retroperitoneal abscess, unclear etiology at this time. In that area could be the kidney, also could be the large bowel. 2.  Hypertension. 3.  Anemia. PLAN:  1. Continue PPI. 2.  Colonoscopy indicated. I will await until clinically improved to  proceed with that. The patient will follow up with us to have that done as  As outpatient . Thank you for allowing me to participate in this patient's care.         Marie Blandon M.D.    D: 12/04/2021 1:28:06       T: 12/04/2021 3:00:52     [email or web address]  Job#: [id number]     Doc#: [id number]

## 2021-12-05 PROBLEM — D70.8 OTHER NEUTROPENIA (HCC): Status: ACTIVE | Noted: 2021-12-05

## 2021-12-05 LAB
ALBUMIN SERPL-MCNC: 3.2 G/DL (ref 3.5–5.1)
ALP BLD-CCNC: 72 U/L (ref 38–126)
ALT SERPL-CCNC: 7 U/L (ref 11–66)
ANION GAP SERPL CALCULATED.3IONS-SCNC: 10 MEQ/L (ref 8–16)
AST SERPL-CCNC: 18 U/L (ref 5–40)
BASOPHILS # BLD: 1.4 %
BASOPHILS ABSOLUTE: 0 THOU/MM3 (ref 0–0.1)
BILIRUB SERPL-MCNC: 0.2 MG/DL (ref 0.3–1.2)
BLOOD CULTURE, ROUTINE: NORMAL
BLOOD CULTURE, ROUTINE: NORMAL
BUN BLDV-MCNC: 8 MG/DL (ref 7–22)
CALCIUM SERPL-MCNC: 9.2 MG/DL (ref 8.5–10.5)
CHLORIDE BLD-SCNC: 106 MEQ/L (ref 98–111)
CO2: 26 MEQ/L (ref 23–33)
CREAT SERPL-MCNC: 0.8 MG/DL (ref 0.4–1.2)
EOSINOPHIL # BLD: 1.8 %
EOSINOPHILS ABSOLUTE: 0.1 THOU/MM3 (ref 0–0.4)
ERYTHROCYTE [DISTWIDTH] IN BLOOD BY AUTOMATED COUNT: 16.7 % (ref 11.5–14.5)
ERYTHROCYTE [DISTWIDTH] IN BLOOD BY AUTOMATED COUNT: 54.1 FL (ref 35–45)
FOLATE: 6.9 NG/ML (ref 4.8–24.2)
GFR SERPL CREATININE-BSD FRML MDRD: > 90 ML/MIN/1.73M2
GLUCOSE BLD-MCNC: 142 MG/DL (ref 70–108)
HCT VFR BLD CALC: 35.5 % (ref 42–52)
HEMOGLOBIN: 10.1 GM/DL (ref 14–18)
HYPOCHROMIA: PRESENT
IMMATURE GRANS (ABS): 0.02 THOU/MM3 (ref 0–0.07)
IMMATURE GRANULOCYTES: 0.7 %
IRON: 38 UG/DL (ref 65–195)
LYMPHOCYTES # BLD: 39.9 %
LYMPHOCYTES ABSOLUTE: 1.1 THOU/MM3 (ref 1–4.8)
MCH RBC QN AUTO: 25.5 PG (ref 26–33)
MCHC RBC AUTO-ENTMCNC: 28.5 GM/DL (ref 32.2–35.5)
MCV RBC AUTO: 89.6 FL (ref 80–94)
MONOCYTES # BLD: 8.5 %
MONOCYTES ABSOLUTE: 0.2 THOU/MM3 (ref 0.4–1.3)
NUCLEATED RED BLOOD CELLS: 0 /100 WBC
PLATELET # BLD: 243 THOU/MM3 (ref 130–400)
PLATELET ESTIMATE: ADEQUATE
PMV BLD AUTO: 8.8 FL (ref 9.4–12.4)
POIKILOCYTES: ABNORMAL
POTASSIUM SERPL-SCNC: 4.2 MEQ/L (ref 3.5–5.2)
RBC # BLD: 3.96 MILL/MM3 (ref 4.7–6.1)
SCAN OF BLOOD SMEAR: NORMAL
SEG NEUTROPHILS: 47.7 %
SEGMENTED NEUTROPHILS ABSOLUTE COUNT: 1.3 THOU/MM3 (ref 1.8–7.7)
SODIUM BLD-SCNC: 142 MEQ/L (ref 135–145)
TOTAL IRON BINDING CAPACITY: 193 UG/DL (ref 171–450)
TOTAL PROTEIN: 6.7 G/DL (ref 6.1–8)
VITAMIN B-12: 765 PG/ML (ref 211–911)
WBC # BLD: 2.8 THOU/MM3 (ref 4.8–10.8)

## 2021-12-05 PROCEDURE — 83550 IRON BINDING TEST: CPT

## 2021-12-05 PROCEDURE — 83540 ASSAY OF IRON: CPT

## 2021-12-05 PROCEDURE — 1200000003 HC TELEMETRY R&B

## 2021-12-05 PROCEDURE — 82746 ASSAY OF FOLIC ACID SERUM: CPT

## 2021-12-05 PROCEDURE — 6360000002 HC RX W HCPCS: Performed by: INTERNAL MEDICINE

## 2021-12-05 PROCEDURE — 80053 COMPREHEN METABOLIC PANEL: CPT

## 2021-12-05 PROCEDURE — 36415 COLL VENOUS BLD VENIPUNCTURE: CPT

## 2021-12-05 PROCEDURE — 6360000002 HC RX W HCPCS: Performed by: REGISTERED NURSE

## 2021-12-05 PROCEDURE — 2580000003 HC RX 258: Performed by: INTERNAL MEDICINE

## 2021-12-05 PROCEDURE — 85025 COMPLETE CBC W/AUTO DIFF WBC: CPT

## 2021-12-05 PROCEDURE — 82607 VITAMIN B-12: CPT

## 2021-12-05 PROCEDURE — 6370000000 HC RX 637 (ALT 250 FOR IP): Performed by: INTERNAL MEDICINE

## 2021-12-05 PROCEDURE — 6370000000 HC RX 637 (ALT 250 FOR IP): Performed by: REGISTERED NURSE

## 2021-12-05 PROCEDURE — 2580000003 HC RX 258: Performed by: REGISTERED NURSE

## 2021-12-05 RX ORDER — FERROUS SULFATE 325(65) MG
325 TABLET ORAL 2 TIMES DAILY WITH MEALS
Status: DISCONTINUED | OUTPATIENT
Start: 2021-12-05 | End: 2021-12-08 | Stop reason: HOSPADM

## 2021-12-05 RX ORDER — METRONIDAZOLE 500 MG/1
500 TABLET ORAL EVERY 8 HOURS SCHEDULED
Status: DISCONTINUED | OUTPATIENT
Start: 2021-12-05 | End: 2021-12-08 | Stop reason: HOSPADM

## 2021-12-05 RX ADMIN — PIPERACILLIN AND TAZOBACTAM 3375 MG: 3; .375 INJECTION, POWDER, LYOPHILIZED, FOR SOLUTION INTRAVENOUS at 10:21

## 2021-12-05 RX ADMIN — ACETAMINOPHEN 650 MG: 325 TABLET ORAL at 21:05

## 2021-12-05 RX ADMIN — CEFTRIAXONE SODIUM 1000 MG: 1 INJECTION, POWDER, FOR SOLUTION INTRAMUSCULAR; INTRAVENOUS at 16:54

## 2021-12-05 RX ADMIN — FERROUS SULFATE TAB 325 MG (65 MG ELEMENTAL FE) 325 MG: 325 (65 FE) TAB at 14:59

## 2021-12-05 RX ADMIN — SODIUM CHLORIDE, PRESERVATIVE FREE 10 ML: 5 INJECTION INTRAVENOUS at 08:58

## 2021-12-05 RX ADMIN — ACETAMINOPHEN 650 MG: 325 TABLET ORAL at 00:32

## 2021-12-05 RX ADMIN — SODIUM CHLORIDE, PRESERVATIVE FREE 10 ML: 5 INJECTION INTRAVENOUS at 21:05

## 2021-12-05 RX ADMIN — METRONIDAZOLE 500 MG: 500 TABLET ORAL at 21:05

## 2021-12-05 RX ADMIN — METRONIDAZOLE 500 MG: 500 TABLET ORAL at 16:49

## 2021-12-05 RX ADMIN — PIPERACILLIN AND TAZOBACTAM 3375 MG: 3; .375 INJECTION, POWDER, LYOPHILIZED, FOR SOLUTION INTRAVENOUS at 02:03

## 2021-12-05 RX ADMIN — ENOXAPARIN SODIUM 40 MG: 100 INJECTION SUBCUTANEOUS at 21:07

## 2021-12-05 RX ADMIN — ACETAMINOPHEN 650 MG: 325 TABLET ORAL at 14:58

## 2021-12-05 RX ADMIN — ACETAMINOPHEN 650 MG: 325 TABLET ORAL at 06:12

## 2021-12-05 ASSESSMENT — PAIN SCALES - GENERAL
PAINLEVEL_OUTOF10: 0
PAINLEVEL_OUTOF10: 4
PAINLEVEL_OUTOF10: 3
PAINLEVEL_OUTOF10: 4
PAINLEVEL_OUTOF10: 3
PAINLEVEL_OUTOF10: 4

## 2021-12-05 ASSESSMENT — PAIN DESCRIPTION - DESCRIPTORS
DESCRIPTORS: ACHING
DESCRIPTORS: ACHING

## 2021-12-05 ASSESSMENT — PAIN DESCRIPTION - LOCATION
LOCATION: BACK
LOCATION: BACK;FLANK

## 2021-12-05 ASSESSMENT — PAIN DESCRIPTION - PAIN TYPE
TYPE: CHRONIC PAIN
TYPE: CHRONIC PAIN

## 2021-12-05 ASSESSMENT — PAIN DESCRIPTION - FREQUENCY
FREQUENCY: CONTINUOUS
FREQUENCY: CONTINUOUS

## 2021-12-05 ASSESSMENT — PAIN - FUNCTIONAL ASSESSMENT
PAIN_FUNCTIONAL_ASSESSMENT: ACTIVITIES ARE NOT PREVENTED
PAIN_FUNCTIONAL_ASSESSMENT: PREVENTS OR INTERFERES SOME ACTIVE ACTIVITIES AND ADLS

## 2021-12-05 ASSESSMENT — PAIN DESCRIPTION - PROGRESSION
CLINICAL_PROGRESSION: NOT CHANGED

## 2021-12-05 ASSESSMENT — PAIN DESCRIPTION - ONSET
ONSET: ON-GOING
ONSET: ON-GOING

## 2021-12-05 ASSESSMENT — PAIN DESCRIPTION - ORIENTATION: ORIENTATION: RIGHT

## 2021-12-05 NOTE — PROGRESS NOTES
Progress note: Infectious diseases    Patient - Ronaldo Webb,  Age - 66 y.o.    - 1950      Room Number - 7K-03/003-A   MRN -  [id number]   Acct # - [de-identified]  Date of Admission -  2021 11:25 AM    SUBJECTIVE:   No new complaints. His wbc has dropped due to Zosyn  The drainage is less. OBJECTIVE   VITALS    height is 5' 8\" (1.727 m) and weight is 138 lb 1.6 oz (62.6 kg). His oral temperature is 97.6 °F (36.4 °C). His blood pressure is 130/75 and his pulse is 65. His respiration is 18 and oxygen saturation is 98%.        Wt Readings from Last 3 Encounters:   21 138 lb 1.6 oz (62.6 kg)   20 135 lb 9.6 oz (61.5 kg)       I/O (24 Hours)    Intake/Output Summary (Last 24 hours) at 2021 1130  Last data filed at 2021 0507  Gross per 24 hour   Intake 834.06 ml   Output 200 ml   Net 634.06 ml       General Appearance  Awake, alert, oriented,  not  In acute distress  HEENT - normocephalic, atraumatic, slighlty pale conjunctiva,  anicteric sclera  Neck - Supple, no mass  Lungs -  Bilateral   air entry, no rhonchi, no wheeze  Cardiovascular - Heart sounds are normal.     Abdomen - soft, not distended, nontender, drain over the right flank area     Neurologic -oriented  Skin - No bruising or bleeding  Extremities - No edema, no cyanosis, clubbing     MEDICATIONS:      ferrous sulfate  325 mg Oral BID WC    piperacillin-tazobactam  3,375 mg IntraVENous Q8H    sodium chloride flush  5-40 mL IntraVENous 2 times per day    enoxaparin  40 mg SubCUTAneous Daily      sodium chloride Stopped (21 1540)     sodium chloride flush, sodium chloride, acetaminophen **OR** acetaminophen, promethazine **OR** ondansetron       Problem list of patient:     Patient Active Problem List   Diagnosis Code    Abscess of abdominal cavity (HCC) K65.1    Weight loss, unintentional R63.4    Lower abdominal pain R10.30    Moderate malnutrition (HCC) E44.0    Lung nodule R91.1    Severe malnutrition (HCC) E43    Chronic anemia D64.9    Other neutropenia (HCC) D70.8         ASSESSMENT/PLAN   Abscess over the right kidney /liver area: he has a drain placed. There is minimal drainage  Leukopenia due to ix zosyn.  Will stop iv zosyn place on oral flagyl and rocephin  Will scan his abdomen on Monday    Namita Alas MD, MD, FACP 12/5/2021 11:30 AM

## 2021-12-05 NOTE — PROGRESS NOTES
Internal medicine specialties progress Note    Patient:  Brittnee Gonsalves      Unit/Bed:7K-03/003-A    YOB: 1950    MRN: [id number]     Acct: [de-identified]     Admit date: 11/29/2021    The patient is a 66 y.o. male  who was admitted with abdominal pain over the past few months with lack of appetite. In the emergency department CT abdomen showed 9.3X9.5X3.8cm abcess right posterior pararenal space near hepatic lobe and 5mm pulm nodules with mildly enlarged caridophrenic lymph nodes. He was started on antibiotics including Zosyn and vancomycin based on cultures from a similar abscess a year ago. The patient is status post CT-guided drainage of the abscess, with drain placement. He has continued to have significant output from the drain. cultures show polymicrobial growth. The patient is feeling better today with pain localized mainly around the drain site. He is eating with no nausea or vomiting. His labs today notable for worsening neutropenia. X56 and folic acid levels are normal.    Review of systems:  Respiratory: Denies cough and shortness of breath. Cardiovascular: Denies chest pains and palpitations. Neuro: No headaches or seizures. Musculoskeletal: No joint pains. Past Medical History:   Diagnosis Date    Cerebral artery occlusion with cerebral infarction ([facility] [address]     Chronic anemia 12/4/2021    Hypertension     Other neutropenia ([facility] [address] 12/5/2021     No family history on file.   Social History     Socioeconomic History    Marital status:      Spouse name: Not on file    Number of children: Not on file    Years of education: Not on file    Highest education level: Not on file   Occupational History    Not on file   Tobacco Use    Smoking status: Never Smoker    Smokeless tobacco: Never Used   Substance and Sexual Activity    Alcohol use: Not on file    Drug use: Not on file    Sexual activity: Not on file   Other Topics Concern    Not on file   Social History Narrative  Not on file     Social Determinants of Health     Financial Resource Strain:     Difficulty of Paying Living Expenses: Not on file   Food Insecurity:     Worried About Running Out of Food in the Last Year: Not on file    Arielle of Food in the Last Year: Not on file   Transportation Needs:     Lack of Transportation (Medical): Not on file    Lack of Transportation (Non-Medical): Not on file   Physical Activity:     Days of Exercise per Week: Not on file    Minutes of Exercise per Session: Not on file   Stress:     Feeling of Stress : Not on file   Social Connections:     Frequency of Communication with Friends and Family: Not on file    Frequency of Social Gatherings with Friends and Family: Not on file    Attends Taoism Services: Not on file    Active Member of [employer] or Organizations: Not on file    Attends Club or Organization Meetings: Not on file    Marital Status: Not on file   Intimate Partner Violence:     Fear of Current or Ex-Partner: Not on file    Emotionally Abused: Not on file    Physically Abused: Not on file    Sexually Abused: Not on file   Housing Stability:     Unable to Pay for Housing in the Last Year: Not on file    Number of [place] in the Last Year: Not on file    Unstable Housing in the Last Year: Not on file     . Diet:  ADULT ORAL NUTRITION SUPPLEMENT; Breakfast, Dinner; Standard 4 oz Oral Supplement  ADULT ORAL NUTRITION SUPPLEMENT; Lunch; Frozen Oral Supplement  ADULT DIET;  Regular    Medications:  Scheduled Meds:   piperacillin-tazobactam  3,375 mg IntraVENous Q8H    sodium chloride flush  5-40 mL IntraVENous 2 times per day    enoxaparin  40 mg SubCUTAneous Daily     Continuous Infusions:   sodium chloride Stopped (11/30/21 1540)     PRN Meds:sodium chloride flush, sodium chloride, acetaminophen **OR** acetaminophen, promethazine **OR** ondansetron    Objective:    CBC:   Recent Labs     12/04/21  0540 12/05/21  0838   WBC 3.7* 2.8*   HGB 10.0* 10.1*    243     BMP:    Recent Labs     12/05/21  0838      K 4.2      CO2 26   BUN 8   CREATININE 0.8   GLUCOSE 142*     Calcium:  Recent Labs     12/05/21  0838   CALCIUM 9.2     Ionized Calcium:No results for input(s): IONCA in the last 72 hours. Magnesium:No results for input(s): MG in the last 72 hours. Phosphorus:No results for input(s): PHOS in the last 72 hours. BNP:No results for input(s): BNP in the last 72 hours. Glucose:No results for input(s): POCGLU in the last 72 hours. HgbA1C: No results for input(s): LABA1C in the last 72 hours. INR: No results for input(s): INR in the last 72 hours. Hepatic:   Recent Labs     12/05/21  0838   ALKPHOS 72   ALT 7*   AST 18   PROT 6.7   BILITOT 0.2*   LABALBU 3.2*     Amylase and Lipase:No results for input(s): LACTA, AMYLASE in the last 72 hours. Lactic Acid: No results for input(s): LACTA in the last 72 hours. Troponin: No results for input(s): CKTOTAL, CKMB, TROPONINT in the last 72 hours. BNP: No results for input(s): BNP in the last 72 hours. Lipids: No results for input(s): CHOL, TRIG, HDL, LDLCALC in the last 72 hours.     Invalid input(s): LDL  ABGs: No results found for: PH, PCO2, PO2, HCO3, O2SAT    Radiology reports as per the Radiologist  Radiology: ECHO Complete 2D W Doppler W Color    Result Date: 11/30/2021  Transthoracic Echocardiography Report (TTE)  Demographics   Patient Name    Terrell Vicente Gender                Male   MR #            [id number]   Race                                                 Ethnicity   Account #       [de-identified]   Room Number           0003   Accession       [id number]  Date of Study         11/30/2021  Number   Date of Birth   1950  Referring Physician   Hailee Quinteros MD   Age             66 year(s)  [facility] [facility]                               Interpreting          Echo reader of the week                              Physician Sujatha Ojeda MD  Procedure Type of Study   TTE procedure:ECHOCARDIOGRAM COMPLETE 2D W DOPPLER W COLOR. Procedure Date Date: 11/30/2021 Start: 03:20 PM Study Location: Bedside Technical Quality: Poor visualization due to lung interface. Indications:Rule out endocarditis. Additional Medical History:Hypertension. Patient Status: Routine Height: 68 inches Weight: 125 pounds BSA: 1.67 m^2 BMI: 19.01 kg/m^2 BP: 113/66 mmHg  Conclusions   Summary  Ejection fraction is visually estimated at 55%. Overall left ventricular function is normal.   Signature   ----------------------------------------------------------------  Electronically signed by Sujatha Ojeda MD (Interpreting  physician) on 11/30/2021 at 07:42 PM  ----------------------------------------------------------------   Findings   Mitral Valve  The mitral valve structure was normal with normal leaflet separation. DOPPLER: The transmitral velocity was within the normal range with no  evidence for mitral stenosis. There was no evidence of mitral  regurgitation. Aortic Valve  The aortic valve was trileaflet with normal thickness and cuspal  separation. DOPPLER: Transaortic velocity was within the normal range with  no evidence of aortic stenosis. There was no evidence of aortic  regurgitation. Tricuspid Valve  Trivial tricuspid regurgitation visualized. Pulmonic Valve  The pulmonic valve was not well visualized . Trivial pulmonic regurgitation visualized. Left Atrium  Left atrial size was normal.   Left Ventricle  Ejection fraction is visually estimated at 55%. Overall left ventricular function is normal.   Right Atrium  Right atrial size was normal.   Right Ventricle  The right ventricular size was normal with normal systolic function and  wall thickness. Pericardial Effusion  The pericardium was normal in appearance with no evidence of a pericardial  effusion. Pleural Effusion  No evidence of pleural effusion.    Aorta / Great Vessels  -Aortic root dimension within normal limits.  -The Pulmonary artery is within normal limits. -IVC size is within normal limits with normal respiratory phasic changes. M-Mode/2D Measurements & Calculations   LV Diastolic    LV Systolic Dimension: 3.1  AV Cusp Separation: 1.8 cmLA  Dimension: 4.5  cm                          Dimension: 4.5 cmAO Root  cm              LV Volume Diastolic: 87.9   Dimension: 3.6 cm  LV FS:31.1 %    ml  LV PW           LV Volume Systolic: 18.8 ml  Diastolic: 1 cm LV EDV/LV EDV Index: 92.4  Septum          ml/55 m^2LV ESV/LV ESV      RV Diastolic Dimension: 1.7 cm  Diastolic: 0.8  Index: 16.5 ml/23 m^2  cm              EF Calculated: 59 %         LA/Aorta: 1.25  Doppler Measurements & Calculations   MV Peak E-Wave: 64.3 cm/s  AV Peak Velocity:     LVOT Peak Velocity: 75.4  MV Peak A-Wave: 48 cm/s    95.3 cm/s             cm/s  MV E/A Ratio: 1.34         AV Peak Gradient:     LVOT Peak Gradient: 2  MV Peak Gradient: 1.65     3.63 mmHg             mmHg  mmHg                                                   TV Peak E-Wave: 51.8 cm/s  MV Deceleration Time: 229                        TV Peak A-Wave: 47.1 cm/s  msec  MV P1/2t: 67 msec                                TV Peak Gradient: 1.07  MVA by PHT:3.28 cm^2                             mmHg                                                   TR Velocity:246 cm/s  MV E' Septal Velocity: 6.9 AV DVI (Vmax):0.79    TR Gradient:24.21 mmHg  cm/s                                             PV Peak Velocity: 87.8  MV A' Septal Velocity: 8.3                       cm/s  cm/s                                             PV Peak Gradient: 3.08  MV E' Lateral Velocity:                          mmHg  6.7 cm/s  MV A' Lateral Velocity:  11.1 cm/s  E/E' septal: 9.32  E/E' lateral: 9.6  [email or web address] [id number]    XR CHEST (2 VW)    Result Date: 11/29/2021  PROCEDURE: XR CHEST (2 VW) CLINICAL INFORMATION: Abdominal pain. COMPARISON: No prior study. TECHNIQUE: PA and lateral views of the chest performed. FINDINGS: Lines/tubes: None. Heart/mediastinum: The heart size is normal. The pulmonary vascularity is unremarkable. Lungs: Patchy right lower lung airspace opacities are observed. A small right pleural effusion is identified. The right hemidiaphragm is elevated. The lungs are hyperinflated. The left lung appears clear. No pneumothorax is observed. Bones: Diffuse osteopenia is present. The visualized skeletal structures appear intact. Patchy right lower lobe airspace opacity and small right pleural effusion suggest pneumonia in the appropriate clinical setting. Follow-up to ensure resolution is advised. **This report has been created using voice recognition software. It may contain minor errors which are inherent in voice recognition technology. ** Final report electronically signed by Dr Mitul Balbuena on 11/29/2021 1:00 PM    CT CHEST W CONTRAST    Result Date: 11/30/2021  PROCEDURE: CT CHEST W CONTRAST CLINICAL INFORMATION: lung nodules COMPARISON: CT abdomen and pelvis 11/29/2021 TECHNIQUE: 5 mm axial imaging through the chest with IV contrast. Coronal and sagittal reconstruction were performed. All CT scans at this facility use dose modulation, iterative reconstruction, and/or weight based dosing when appropriate to reduce the radiation dose to as low as reasonably achievable. CONTRAST: 80 mL of Isovue-370 FINDINGS: Again seen is a 9.7 x 4.2 cm abscess collection in the right posterior pararenal space just posterior to the right hepatic lobe. There is a 1 to 2 mm right upper lobe pulmonary nodule (series 2, image 15). A 4 mm right upper lobe pulmonary nodule (image 18) a 5 mm right middle lobe pulmonary nodule (image 42). A 4 mm subpleural left lower lobe pulmonary nodule (image 37). A 4 mm lingular pulmonary nodule (image 42).  Subsegmental atelectasis seen in the lung bases bilaterally. Ectasia of the ascending thoracic aorta. Aortocoronary calcifications. Calcified granulomas are seen in the superior segment of the left lower lobe and in the left lung base. No large pulmonary mass. Central airway is patent. Miniscule right pleural effusion. No significant pericardial effusion. The heart is not enlarged. The main pulmonary artery is not dilated. No significantly enlarged mediastinal or  axillary lymph node. The thyroid is not enlarged. No chest wall mass. Limited evaluation below the diaphragm show that the gallbladder is surgically absent. . Bones: The bones are demineralized. Degenerative changes of the thoracic spine. 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 1. Again seen is the abscess collection in the right posterior pararenal space. 2. Sub-5 mm pulmonary nodules are seen. Consider a follow-up evaluation in one year. 3. Very small right pleural effusion. Bibasilar atelectasis, right greater than left. 4. Other findings as described above. **This report has been created using voice recognition software. It may contain minor errors which are inherent in voice recognition technology. ** Final report electronically signed by Dr Epi Plasencia on 11/30/2021 5:24 PM    CT GUIDED NEEDLE PLACEMENT    Result Date: 11/30/2021  PROCEDURE: CT ASP ABS HEMATOMA BULLA CYST, CT GUIDED NEEDLE PLACEMENT CLINICAL INFORMATION: Abscess right post pararenal space near hepatic lobe, aspiration . COMPARISON: CT abdomen pelvis dated 11/29/2021. TECHNIQUE: Risks and benefits of the procedure were thoroughly explained and oral and written informed consent obtained. The patient was placed in a left lateral decubitus position on the CT table and a suitable puncture site at an appropriate right intercostal space was designated using CT guidance. A timeout procedure was performed and the patient was prepped and draped in usual fashion.  1% lidocaine was infiltrated in the subcutis tissues at the designated puncture site and a 5 Western Brionna needle/catheter system was directed into the fluid collection posterior to the liver. The inner needle was removed and approximately 200 mL purulent tan fluid was aspirated with approximately 40 mL sent to the laboratory for further analysis. The catheter was then removed and a bandage placed. The patient tolerated the procedure well and no immediate postprocedural complication was identified. Dose (mGycm: 282 for ct chest and 518 for aspiration Medication Utilized: / Total Length of Sedation: / Physician Performing Procedure: dr Jose C Crisostomo with Procedure: colby  All CT scans at this facility use dose modulation, iterative reconstruction, and/or weight-based dosing when appropriate to reduce radiation dose to as low as reasonably achievable. FINDINGS: Preprocedure image shows prominent collection posterior to the liver measuring 8.6 x 4.3 cm in greatest axial dimensions. Intraprocedural CT images show catheter within the collection posterior to the liver. Post procedure images show the collection decreased in size compared to preprocedure images measuring approximate 7.4 x 3 cm in greatest axial dimensions. Successful CT-guided aspiration of retroperitoneal perihepatic abscess. **This report has been created using voice recognition software. It may contain minor errors which are inherent in voice recognition technology. ** Final report electronically signed by Dr. Eliezer Wilder MD on 11/30/2021 4:08 PM    CT ABDOMEN PELVIS W IV CONTRAST Additional Contrast? None    Result Date: 11/29/2021  PROCEDURE: CT ABDOMEN PELVIS W IV CONTRAST CLINICAL INFORMATION: abdominal pain, h/o abdominal abscess drained 6 months ago COMPARISON: CT abdomen and pelvis 12/4/2020 TECHNIQUE: 5 mm axial imaging through the abdomen and pelvis with IV contrast.  Coronal and sagittal reconstructions were performed.  All CT scans at this facility use dose modulation, iterative reconstruction, and/or weight based dosing when appropriate to reduce the radiation dose to as low as reasonably achievable. CONTRAST: 80 mL of Isovue-370 FINDINGS: A miniscule right pleural effusion is seen. There is right lung base subsegmental atelectasis. A 3 mm right middle lobe pulmonary nodule is seen (series 2, image 10). A subpleural 5 mm right middle lobe pulmonary nodule (images 16). Another subpleural 2 mm right middle lobe pulmonary nodule (image image 20). Calcified granuloma seen in the left lung base. There is a 4 mm lingular pulmonary nodule (image 21). A 4 mm subpleural left lower lobe pulmonary nodule (image 21). Platelike atelectasis is seen in the left lung base. The ascending thoracic aorta is ectatic at 4.1 cm. Coronary calcifications are noted. There is a 1.5 cm mildly enlarged lymph node in the right epicardial fat. There is a 9.3 x 9.5 x 3.8 cm collection with air bubbles within the in the right posterior pararenal space posterior to the right hepatic lobe. This is consistent with an abscess. The gallbladder is surgically absent. Splenule is present. Stool is seen within the colon. Aortoiliac calcifications. There is a 9 mm calculus in the right renal sinus. Mild right urothelial thickening. No hydronephrosis Otherwise, the liver, biliary tree, pancreas, adrenal glands, and spleen are unremarkable. No bowel obstruction or acute inflammatory bowel process. The abdominal aorta is not aneurysmal. No significantly enlarged lymph nodes are seen. The bladder is grossly unremarkable. The prostate is not enlarged. Prostatic calcifications are seen. Bones: The bones are demineralized. Degenerative changes of the thoracolumbar spine. Minimal spondylolisthesis of L5. Left hip arthroplasty has been performed. Intramedullary ruth ann and screw fixation of the right femur is seen.      1. There is a 9.3 x 9.5 x 3.8 cm abscess in the right posterior pararenal space just posterior to the right hepatic lobe. 2. Sub-5 mm pulmonary nodules are seen. 3. 9 mm right renal sinus calculus. Mild right urothelial thickening is seen. 4. Mildly enlarged right cardiophrenic lymph node. 5. Other incidental findings as described above. **This report has been created using voice recognition software. It may contain minor errors which are inherent in voice recognition technology. ** Final report electronically signed by Dr Jenae Vences on 11/29/2021 1:57 PM    CT ASP ABS HEMATOMA BULLA CYST    Result Date: 11/30/2021  PROCEDURE: CT ASP ABS HEMATOMA BULLA CYST, CT GUIDED NEEDLE PLACEMENT CLINICAL INFORMATION: Abscess right post pararenal space near hepatic lobe, aspiration . COMPARISON: CT abdomen pelvis dated 11/29/2021. TECHNIQUE: Risks and benefits of the procedure were thoroughly explained and oral and written informed consent obtained. The patient was placed in a left lateral decubitus position on the CT table and a suitable puncture site at an appropriate right intercostal space was designated using CT guidance. A timeout procedure was performed and the patient was prepped and draped in usual fashion. 1% lidocaine was infiltrated in the subcutis tissues at the designated puncture site and a 5 Western Brionna needle/catheter system was directed into the fluid collection posterior to the liver. The inner needle was removed and approximately 200 mL purulent tan fluid was aspirated with approximately 40 mL sent to the laboratory for further analysis. The catheter was then removed and a bandage placed. The patient tolerated the procedure well and no immediate postprocedural complication was identified.  Dose (mGycm: 282 for ct chest and 518 for aspiration Medication Utilized: / Total Length of Sedation: / Physician Performing Procedure: dr Kushal Ramos with Procedure: colby  All CT scans at this facility use dose modulation, iterative reconstruction, and/or weight-based dosing when appropriate to reduce radiation dose to as low as reasonably achievable. FINDINGS: Preprocedure image shows prominent collection posterior to the liver measuring 8.6 x 4.3 cm in greatest axial dimensions. Intraprocedural CT images show catheter within the collection posterior to the liver. Post procedure images show the collection decreased in size compared to preprocedure images measuring approximate 7.4 x 3 cm in greatest axial dimensions. Successful CT-guided aspiration of retroperitoneal perihepatic abscess. **This report has been created using voice recognition software. It may contain minor errors which are inherent in voice recognition technology. ** Final report electronically signed by Dr. Amada Robles MD on 11/30/2021 4:08 PM       Physical Exam:  Vitals: /75   Pulse 65   Temp 97.6 °F (36.4 °C) (Oral)   Resp 18   Ht 5' 8\" (1.727 m)   Wt 138 lb 1.6 oz (62.6 kg)   SpO2 98%   BMI 21.00 kg/m²   24 hour intake/output:    Intake/Output Summary (Last 24 hours) at 12/5/2021 1122  Last data filed at 12/5/2021 0507  Gross per 24 hour   Intake 834.06 ml   Output 200 ml   Net 634.06 ml     Last 3 weights: Wt Readings from Last 3 Encounters:   12/02/21 138 lb 1.6 oz (62.6 kg)   11/30/20 135 lb 9.6 oz (61.5 kg)       General appearance - alert, well appearing, and in no distress  HEENT: Normocephalic, Atraumatic, Conjuctiva pink and PERRL  Chest - clear to auscultation, no wheezes, rales or rhonchi, symmetric air entry  Cardiovascular - normal rate, regular rhythm, normal S1, S2, no murmurs, rubs, clicks or gallops  Abdomen -mild right upper quadrant tenderness. Drain in place. Bowel sounds present.   Neurological - Alert and oriented, Normal speech, No focal findings or movement disorder noted and Motor and sensory grossly normal bilaterally  Integumentary - Skin color, texture, turgor normal. No Rashes or lesions  Musculoskeletal -Full ROM times 4 extremities, No clubbing or cyanosis and No peripheral edema      DVT prophylaxis: [x] Lovenox                                 [] SCDs                                 [] SQ Heparin                                 [] Encourage ambulation           [] Already on Anticoagulation                 Assessment/plan:    Abscess of abdominal cavity ([address]): Clinically improved with current regiment. Still having significant output from drain. Pain is controlled. Cultures are polymicrobial.  Getting good coverage from current antibiotics except that it appears to be causing neutropenia. This has been discussed with ID. Severe malnutrition [facility]): Patient had lost substantial amount of weight partly related to poor appetite. His appetite has picked up. We will continue with nutritional support. Chronic anemia J17 and folic acid levels are normal.  Iron studies are abnormal.  Will initiate iron replacement. Monitor hematocrit. Other neutropenia ([address]): Most likely related to antibiotic and this has been discussed with ID. We will need to substitute the antibiotics. Consider hematology consult if no improvement soon. Lung nodule: Asymptomatic. He will be followed by pulmonary as an outpatient to further evaluate. Total time for this encounter was 30 minutes.     Electronically signed by Mckenzie Jin MD on 12/5/2021 at 11:22 AM

## 2021-12-06 ENCOUNTER — APPOINTMENT (OUTPATIENT)
Dept: CT IMAGING | Age: 71
DRG: 441 | End: 2021-12-06
Payer: COMMERCIAL

## 2021-12-06 LAB
ANION GAP SERPL CALCULATED.3IONS-SCNC: 7 MEQ/L (ref 8–16)
BASOPHILS # BLD: 1.1 %
BASOPHILS ABSOLUTE: 0 THOU/MM3 (ref 0–0.1)
BUN BLDV-MCNC: 9 MG/DL (ref 7–22)
CALCIUM SERPL-MCNC: 9 MG/DL (ref 8.5–10.5)
CHLORIDE BLD-SCNC: 107 MEQ/L (ref 98–111)
CO2: 28 MEQ/L (ref 23–33)
CREAT SERPL-MCNC: 0.6 MG/DL (ref 0.4–1.2)
EOSINOPHIL # BLD: 1.9 %
EOSINOPHILS ABSOLUTE: 0.1 THOU/MM3 (ref 0–0.4)
ERYTHROCYTE [DISTWIDTH] IN BLOOD BY AUTOMATED COUNT: 17.1 % (ref 11.5–14.5)
ERYTHROCYTE [DISTWIDTH] IN BLOOD BY AUTOMATED COUNT: 52.3 FL (ref 35–45)
GFR SERPL CREATININE-BSD FRML MDRD: > 90 ML/MIN/1.73M2
GLUCOSE BLD-MCNC: 88 MG/DL (ref 70–108)
HCT VFR BLD CALC: 32.2 % (ref 42–52)
HEMOGLOBIN: 9.7 GM/DL (ref 14–18)
IMMATURE GRANS (ABS): 0.03 THOU/MM3 (ref 0–0.07)
IMMATURE GRANULOCYTES: 0.8 %
LYMPHOCYTES # BLD: 47.1 %
LYMPHOCYTES ABSOLUTE: 1.7 THOU/MM3 (ref 1–4.8)
MCH RBC QN AUTO: 26.1 PG (ref 26–33)
MCHC RBC AUTO-ENTMCNC: 30.1 GM/DL (ref 32.2–35.5)
MCV RBC AUTO: 86.6 FL (ref 80–94)
MONOCYTES # BLD: 9.1 %
MONOCYTES ABSOLUTE: 0.3 THOU/MM3 (ref 0.4–1.3)
NUCLEATED RED BLOOD CELLS: 0 /100 WBC
PLATELET # BLD: 240 THOU/MM3 (ref 130–400)
PMV BLD AUTO: 8.5 FL (ref 9.4–12.4)
POTASSIUM SERPL-SCNC: 4.1 MEQ/L (ref 3.5–5.2)
RBC # BLD: 3.72 MILL/MM3 (ref 4.7–6.1)
SEG NEUTROPHILS: 40 %
SEGMENTED NEUTROPHILS ABSOLUTE COUNT: 1.4 THOU/MM3 (ref 1.8–7.7)
SODIUM BLD-SCNC: 142 MEQ/L (ref 135–145)
WBC # BLD: 3.6 THOU/MM3 (ref 4.8–10.8)

## 2021-12-06 PROCEDURE — APPSS30 APP SPLIT SHARED TIME 16-30 MINUTES: Performed by: NURSE PRACTITIONER

## 2021-12-06 PROCEDURE — 6370000000 HC RX 637 (ALT 250 FOR IP): Performed by: INTERNAL MEDICINE

## 2021-12-06 PROCEDURE — 99232 SBSQ HOSP IP/OBS MODERATE 35: CPT | Performed by: INTERNAL MEDICINE

## 2021-12-06 PROCEDURE — 2580000003 HC RX 258: Performed by: REGISTERED NURSE

## 2021-12-06 PROCEDURE — 1200000003 HC TELEMETRY R&B

## 2021-12-06 PROCEDURE — 85025 COMPLETE CBC W/AUTO DIFF WBC: CPT

## 2021-12-06 PROCEDURE — 6360000002 HC RX W HCPCS: Performed by: REGISTERED NURSE

## 2021-12-06 PROCEDURE — 74176 CT ABD & PELVIS W/O CONTRAST: CPT

## 2021-12-06 PROCEDURE — 2580000003 HC RX 258: Performed by: INTERNAL MEDICINE

## 2021-12-06 PROCEDURE — 80048 BASIC METABOLIC PNL TOTAL CA: CPT

## 2021-12-06 PROCEDURE — 36415 COLL VENOUS BLD VENIPUNCTURE: CPT

## 2021-12-06 PROCEDURE — 6370000000 HC RX 637 (ALT 250 FOR IP): Performed by: REGISTERED NURSE

## 2021-12-06 PROCEDURE — 6360000002 HC RX W HCPCS: Performed by: INTERNAL MEDICINE

## 2021-12-06 RX ADMIN — FERROUS SULFATE TAB 325 MG (65 MG ELEMENTAL FE) 325 MG: 325 (65 FE) TAB at 09:14

## 2021-12-06 RX ADMIN — METRONIDAZOLE 500 MG: 500 TABLET ORAL at 20:55

## 2021-12-06 RX ADMIN — ENOXAPARIN SODIUM 40 MG: 100 INJECTION SUBCUTANEOUS at 20:55

## 2021-12-06 RX ADMIN — SODIUM CHLORIDE, PRESERVATIVE FREE 10 ML: 5 INJECTION INTRAVENOUS at 09:14

## 2021-12-06 RX ADMIN — CEFTRIAXONE SODIUM 1000 MG: 1 INJECTION, POWDER, FOR SOLUTION INTRAMUSCULAR; INTRAVENOUS at 17:16

## 2021-12-06 RX ADMIN — FERROUS SULFATE TAB 325 MG (65 MG ELEMENTAL FE) 325 MG: 325 (65 FE) TAB at 16:30

## 2021-12-06 RX ADMIN — ACETAMINOPHEN 650 MG: 325 TABLET ORAL at 18:26

## 2021-12-06 RX ADMIN — ACETAMINOPHEN 650 MG: 325 TABLET ORAL at 03:10

## 2021-12-06 RX ADMIN — METRONIDAZOLE 500 MG: 500 TABLET ORAL at 06:03

## 2021-12-06 RX ADMIN — SODIUM CHLORIDE, PRESERVATIVE FREE 10 ML: 5 INJECTION INTRAVENOUS at 20:56

## 2021-12-06 RX ADMIN — METRONIDAZOLE 500 MG: 500 TABLET ORAL at 16:29

## 2021-12-06 RX ADMIN — ACETAMINOPHEN 650 MG: 325 TABLET ORAL at 10:36

## 2021-12-06 ASSESSMENT — PAIN DESCRIPTION - ONSET
ONSET: ON-GOING
ONSET: ON-GOING

## 2021-12-06 ASSESSMENT — PAIN DESCRIPTION - PROGRESSION
CLINICAL_PROGRESSION: NOT CHANGED

## 2021-12-06 ASSESSMENT — PAIN SCALES - GENERAL
PAINLEVEL_OUTOF10: 4
PAINLEVEL_OUTOF10: 3
PAINLEVEL_OUTOF10: 0
PAINLEVEL_OUTOF10: 4

## 2021-12-06 ASSESSMENT — PAIN DESCRIPTION - DESCRIPTORS
DESCRIPTORS: ACHING
DESCRIPTORS: ACHING

## 2021-12-06 ASSESSMENT — PAIN DESCRIPTION - ORIENTATION
ORIENTATION: RIGHT
ORIENTATION: RIGHT

## 2021-12-06 ASSESSMENT — PAIN DESCRIPTION - LOCATION
LOCATION: FLANK;BACK
LOCATION: FLANK;BACK

## 2021-12-06 ASSESSMENT — PAIN DESCRIPTION - PAIN TYPE
TYPE: ACUTE PAIN
TYPE: CHRONIC PAIN

## 2021-12-06 ASSESSMENT — PAIN DESCRIPTION - FREQUENCY
FREQUENCY: CONTINUOUS
FREQUENCY: CONTINUOUS

## 2021-12-06 NOTE — PROGRESS NOTES
[facility]      Patient - Kylah Arango   MRN -  [id number]   [facility]   - 1950      Date of Admission -  2021 11:25 AM  Date of evaluation -  2021  Room - --A   [address] [place] [address] Andrés Lewis MD Primary Care Physician - Elisa Potts MD     Problem List      Active Hospital Problems    Diagnosis Date Noted    Other neutropenia ([facility] [address] [D70.8] 2021     Priority: High    Chronic anemia [D64.9] 2021     Priority: High    Severe malnutrition ([facility] [address] [E43] 2021     Class: Acute    Lung nodule [R91.1]     Abscess of abdominal cavity ([facility] [address] [K65.1] 2020     Reason for Consult    To follow lung nodules noted on the CT scan of abdominal chest  HPI   History Obtained From: Patient and electronic medical record. Kylah Arango is a 66 y.o. male with no significant pmhx who presents with abdominal pain over the past few months with lack of appetite. He had similar symptoms this time last year and was tx for abdominal abscess--drained by IR. He did have unintentional weight loss at that time also, which has continued this year. He reports losing 30 lbs in the past 3-4 months. He denies any chest pain, SOB, nausea, vomiting, diarrhea. His only complaint is diffuse abdominal pain and poor intake. In the emergency department CT abdomen shows 9.3X9.5X3.8cm abcess right posterior pararenal space near hepatic lobe and 5mm pulm nodules with mildly enlarged caridophrenic lymph nodes. His LFT's are ok, EKG ok. Pt is afebrile, VSS. Patient on Zosyn and Vanc due to hx of Staph and E coli grown in the abscess last year. IR to drain abscess. He is having shortness of breath: No  He denies orthopnea. He denies paroxysmal nocturnal dyspnea.       Past 24 hrs   -stable on room air- 100%  -Denies SOB, CP , hemoptysis, or cough  -No pulmonary events overnight   -Zosyn stopped per ID - Flagyl started     -All systems reviewed. PMHx   Past Medical History      Diagnosis Date    Cerebral artery occlusion with cerebral infarction ([facility] [address] [facility]     Chronic anemia 12/4/2021    Hypertension     Other neutropenia ([facility] [address] 12/5/2021      Past Surgical History        Procedure Laterality Date    APPENDECTOMY      CHOLECYSTECTOMY      COLONOSCOPY      CT ASP ABS HEMATOMA BULLA CYST  11/30/2021    CT ASP ABS HEMATOMA BULLA CYST 11/30/2021 [facility] CT SCAN    FRACTURE SURGERY      HERNIA REPAIR      JOINT REPLACEMENT       Meds    Current Medications    ferrous sulfate  325 mg Oral BID WC    cefTRIAXone (ROCEPHIN) IV  1,000 mg IntraVENous Q24H    metroNIDAZOLE  500 mg Oral 3 times per day    sodium chloride flush  5-40 mL IntraVENous 2 times per day    enoxaparin  40 mg SubCUTAneous Daily     sodium chloride flush, sodium chloride, acetaminophen **OR** acetaminophen, promethazine **OR** ondansetron  IV Drips/Infusions   sodium chloride Stopped (11/30/21 1540)     Home Medications  No medications prior to admission. Diet    ADULT ORAL NUTRITION SUPPLEMENT; Breakfast, Dinner; Standard 4 oz Oral Supplement  ADULT ORAL NUTRITION SUPPLEMENT; Lunch; Frozen Oral Supplement  ADULT DIET; Regular  Allergies    Patient has no known allergies.   Social History     Social History     Socioeconomic History    Marital status:      Spouse name: Not on file    Number of children: Not on file    Years of education: Not on file    Highest education level: Not on file   Occupational History    Not on file   Tobacco Use    Smoking status: Never Smoker    Smokeless tobacco: Never Used   Substance and Sexual Activity    Alcohol use: Not on file    Drug use: Not on file    Sexual activity: Not on file   Other Topics Concern    Not on file   Social History Narrative    Not on file     Social Determinants of Health     Financial Resource Strain:     Difficulty of Paying Living Expenses: Not on file   Food Insecurity:     Worried About Running Out of Food in the Last Year: Not on file    Ran Out of Food in the Last Year: Not on file   Transportation Needs:     Lack of Transportation (Medical): Not on file    Lack of Transportation (Non-Medical): Not on file   Physical Activity:     Days of Exercise per Week: Not on file    Minutes of Exercise per Session: Not on file   Stress:     Feeling of Stress : Not on file   Social Connections:     Frequency of Communication with Friends and Family: Not on file    Frequency of Social Gatherings with Friends and Family: Not on file    Attends Religion Services: Not on file    Active Member of [address] [place] [address] [employer] or Organizations: Not on file    Attends Club or Organization Meetings: Not on file    Marital Status: Not on file   Intimate Partner Violence:     Fear of Current or Ex-Partner: Not on file    Emotionally Abused: Not on file    Physically Abused: Not on file    Sexually Abused: Not on file   Housing Stability:     Unable to Pay for Housing in the Last Year: Not on file    Number of [place] in the Last Year: Not on file    Unstable Housing in the Last Year: Not on file     Family History    No family history on file. Sleep History    Never diagnosed with sleep apnea in the past.  Occupational history   Occupation:  He is current working: No  Type of profession: unemployed, disabled.                          History of tobacco smoking:No    History of recreational or IV drug use in the past:NO     History of exposure to coal mines/coal dust: NO  History of exposure to foundry dust/welding: NO  History of exposure to quarry/silica/sandblasting: NO  History of exposure to asbestos/working with breaks/ships: NO  History of exposure to farm dust: NO  History of recent travel to long distances: NO  History of exposure to birds, pigeons, or chickens in the past:NO    History of pulmonary embolism in the past: No            History of DVT in the past:No              Vitals     height is 5' 8\" hours.  INR  No results for input(s): INR, PROTIME in the last 72 hours. PTT  No results for input(s): APTT in the last 72 hours. Glucose  No results for input(s): POCGLU in the last 72 hours. UA No results for input(s): SPECGRAV, PHUR, COLORU, CLARITYU, MUCUS, PROTEINU, BLOODU, RBCUA, WBCUA, BACTERIA, NITRU, GLUCOSEU, BILIRUBINUR, UROBILINOGEN, KETUA, LABCAST, LABCASTTY, AMORPHOS in the last 72 hours. Invalid input(s): CRYSTALS. PFTs   None found     Sleep studies   None found     Cultures    Blood cultures x2: no preliminary growth   COVID 19 negative     EKG     Echocardiogram   11/29/21   Conclusions      Summary   Ejection fraction is visually estimated at 55%. Overall left ventricular function is normal.      Signature      ----------------------------------------------------------------   Electronically signed by Monica Le MD (Interpreting   physician) on 11/30/2021 at 07:42 PM   ----------------------------------------------------------------      Radiology    CXR  2VW CXR 11/29/21:  Patchy right lower lobe airspace opacity and small right pleural effusion suggest pneumonia in the appropriate clinical setting. Follow-up to ensure resolution is advised. CT Scans  (See actual reports for details)    CT CHEST W CONTRAST   11/30/2021   FINDINGS:  Again seen is a 9.7 x 4.2 cm abscess collection in the right posterior pararenal space just posterior to the right hepatic lobe. There is a 1 to 2 mm right upper lobe pulmonary nodule (series 2, image 15). A 4 mm right upper lobe pulmonary nodule (image 18) a 5 mm right middle lobe pulmonary nodule (image 42). A 4 mm subpleural left lower lobe pulmonary nodule (image 37). A 4 mm lingular pulmonary nodule (image 42). Subsegmental atelectasis seen in the lung bases bilaterally. Ectasia of the ascending thoracic aorta. Aortocoronary calcifications.  Calcified granulomas are seen in the superior segment of the left lower lobe and in the left lung thickening. No hydronephrosis  No significantly enlarged lymph nodes are seen. The bladder is grossly unremarkable. The prostate is not enlarged. Prostatic calcifications are seen. Bones: The bones are demineralized. Degenerative changes of the thoracolumbar spine. Minimal spondylolisthesis of L5. Left hip arthroplasty has been performed. Intramedullary ruth ann and screw fixation of the right femur is seen.         Assessment   -Multiple pulmonary nodules including: 3 mm right middle lobe pulmonary nodule, subpleural 5 mm right middle lobe pulmonary nodule, subpleural 2mm right middle lobe pulmonary nodule, 4 mm lingular pulmonary nodule, 4 mm subpleural left lower lobe pulmonary nodule-no old records were available from [place], [place].  -1.5 cm mildly enlarged lymph node in the right epicardial fat   -Calcified granuloma in left lung base   -Atelectasis of left lung base   -Small right pleural effusion-too small for any intervention at this time  -Retroperitoneal abscess   -History of TB exposure   -Hx of CVA  -Essential HTN  -Hx of shingles on his back  -Unintentional weight loss   -Limited Code   Plan   -He was admitted to [facility] in 2016 for stone removal from bile duct. -I did not see any CT chest order in Care every where in Epic. However, he is currently waiting for his old medical record reports from [facility] in [place], [place]  -His Blood cultures X2 sets- negative. -CT chest with multiple lung nodules < 5 mm patient reports he had previous had lung nodules identified but does not recall specifically having CT of chest completed   -ID service following stopping Zosyn starting oral Flagyl   -CT abdomen per ID   -CT chest w/o in one year and follow up in Pulmonary Clinic- testing ordered in [facility]   -Pulmonary will sign off follow up as outlined above     Plan of care discussed with patient and primary RN  Meds and orders reviewed.    Questions and concerns addressed. Electronically signed by   SOLO Morocho CNP on 12/6/2021 at 1:41 PM     Addendum by Dr. Mikki Guzman MD:  I have seen and examined the patient independently. Face to face evaluation and examination was performed. The above evaluation and note has been reviewed. Labs and radiographs were reviewed. I Have discussed with Ms.  ABBEY Nolan CNP about this patient in detail. The above assessment and plan has been reviewed. Please see my modifications mentioned below. My modifications:  He is on room air  Not in distress  Number CT scan reports were available [place], [place]  Patient advised to come for follow-up in 1 year with repeat CT scan of chest without contrast to follow his subcentimeter lung nodules with the largest nodule is measuring less than 5 mm in size  -Jelena Samano and his wife advised to make early appointment with my clinic if he develops any constitutional symptoms including loss of weight, poor appetite or hemoptysis. He verbalizes under standing.       Azeb Katz MD 12/6/2021 8:01 PM

## 2021-12-06 NOTE — PROGRESS NOTES
Progress note: Infectious diseases    Patient - Milbert Runner,  Age - 66 y.o.    - 1950      Room Number - 7K-03/003-A   MRN -  [id number]   Acct # - [de-identified]  Date of Admission -  2021 11:25 AM    SUBJECTIVE:   No new complaints  CT scan shows marked improvement of the abscess  Wbc better  OBJECTIVE   VITALS    height is 5' 8\" (1.727 m) and weight is 138 lb 1.6 oz (62.6 kg). His oral temperature is 98.2 °F (36.8 °C). His blood pressure is 135/82 and his pulse is 79. His respiration is 18 and oxygen saturation is 99%.        Wt Readings from Last 3 Encounters:   21 138 lb 1.6 oz (62.6 kg)   20 135 lb 9.6 oz (61.5 kg)       I/O (24 Hours)    Intake/Output Summary (Last 24 hours) at 2021 1739  Last data filed at 2021 0606  Gross per 24 hour   Intake 475 ml   Output 35 ml   Net 440 ml       General Appearance  Awake, alert, oriented,  not  In acute distress  HEENT - normocephalic, atraumatic, slighlty pale conjunctiva,  anicteric sclera  Neck - Supple, no mass  Lungs -  Bilateral   air entry, no rhonchi, no wheeze  Cardiovascular - Heart sounds are normal.     Abdomen - soft, not distended, nontender, drain over the right flank area     Neurologic -oriented  Skin - No bruising or bleeding  Extremities - No edema, no cyanosis, clubbing     MEDICATIONS:      ferrous sulfate  325 mg Oral BID WC    cefTRIAXone (ROCEPHIN) IV  1,000 mg IntraVENous Q24H    metroNIDAZOLE  500 mg Oral 3 times per day    sodium chloride flush  5-40 mL IntraVENous 2 times per day    enoxaparin  40 mg SubCUTAneous Daily      sodium chloride Stopped (21 1540)     sodium chloride flush, sodium chloride, acetaminophen **OR** acetaminophen, promethazine **OR** ondansetron       Problem list of patient:     Patient Active Problem List   Diagnosis Code    Abscess of abdominal cavity (HCC) K65.1    Weight loss, unintentional R63.4    Lower abdominal pain R10.30    Moderate malnutrition (HCC) E44.0    Lung nodule R91.1    Severe malnutrition (HCC) E43    Chronic anemia D64.9    Other neutropenia (HCC) D70.8         ASSESSMENT/PLAN   Abscess over the right kidney /liver area: he has a drain placed. Leukopenia improving. Continue iv rocephin and flagyl  Follow up CT scan much improved.   Bryanna Jeffery MD, MD, FACP 12/6/2021 5:39 PM

## 2021-12-06 NOTE — PROGRESS NOTES
Progress note      [facility]             Patient:  James Daniels  YOB: 1950    MRN: [id number]   Acct:  [de-identified]   7K-03/003-A  Primary Care Physician: Ryne Rincon MD    Admit Date: 11/29/2021           Subjective:  Has no complaints,Zosyn was stopped on account of leukopenia over the weekend and replaced with oral Flagyl and IV Rocephin. There is an acute short of IV Flagyl    Objective:      Physical Exam:    Vitals:  Patient Vitals for the past 24 hrs:   BP Temp Temp src Pulse Resp SpO2   12/06/21 0905 106/65 98.1 °F (36.7 °C) Oral 75 18 100 %   12/06/21 0306 118/70 97.6 °F (36.4 °C) Oral 63 18 97 %   12/05/21 2059 107/66 97.6 °F (36.4 °C) Oral 59 16 99 %   12/05/21 1600 133/76 97.2 °F (36.2 °C) Oral 79 20 100 %   12/05/21 1210 (!) 149/70 98.4 °F (36.9 °C) Oral 78 16 99 %     Weight: Weight: 138 lb 1.6 oz (62.6 kg)     24 hour intake/output:    Intake/Output Summary (Last 24 hours) at 12/6/2021 1059  Last data filed at 12/6/2021 0606  Gross per 24 hour   Intake 475 ml   Output 35 ml   Net 440 ml       General appearance - alert, well appearing, and in no distress  Eyes - pupils equal and reactive, extraocular eye movements intact  Mouth - mucous membranes moist, pharynx normal without lesions  Neck - supple, no significant adenopathy  Chest - clear to auscultation, no wheezes, rales or rhonchi, symmetric air entry  Heart - normal rate, regular rhythm, normal S1, S2, no murmurs, rubs, clicks or gallops  Abdomen - soft,  Mildly tender to palpation RUQ, nondistended, no masses or organomegaly, pos bs.  Drain in place right flank  Neurological - alert, oriented, normal speech, no focal findings or movement disorder noted  Musculoskeletal - no joint tenderness, deformity or swelling  Extremities - peripheral pulses normal, no pedal edema, no clubbing or cyanosis  Skin - normal coloration and turgor, no rashes, no plan repeat CT scan abdomen and pelvis  today    2. Pulmonary nodules   -Pulmonary following; CT chest showed sub nodules 5mm recommend follow up in 1 year     3. Weight loss and poor appetite  - oral intake has improved  -CEA WNL    4.  DVT prophylaxis   -Lovenox           Electronically signed by Jaxson Leone MD on 12/6/2021 at 10:59 AM

## 2021-12-07 LAB
BASOPHILS # BLD: 1.1 %
BASOPHILS ABSOLUTE: 0 THOU/MM3 (ref 0–0.1)
EOSINOPHIL # BLD: 2.7 %
EOSINOPHILS ABSOLUTE: 0.1 THOU/MM3 (ref 0–0.4)
ERYTHROCYTE [DISTWIDTH] IN BLOOD BY AUTOMATED COUNT: 17.4 % (ref 11.5–14.5)
ERYTHROCYTE [DISTWIDTH] IN BLOOD BY AUTOMATED COUNT: 53.3 FL (ref 35–45)
HCT VFR BLD CALC: 33.4 % (ref 42–52)
HEMOGLOBIN: 9.7 GM/DL (ref 14–18)
IMMATURE GRANS (ABS): 0.02 THOU/MM3 (ref 0–0.07)
IMMATURE GRANULOCYTES: 0.5 %
LYMPHOCYTES # BLD: 44 %
LYMPHOCYTES ABSOLUTE: 1.6 THOU/MM3 (ref 1–4.8)
MCH RBC QN AUTO: 25.5 PG (ref 26–33)
MCHC RBC AUTO-ENTMCNC: 29 GM/DL (ref 32.2–35.5)
MCV RBC AUTO: 87.7 FL (ref 80–94)
MONOCYTES # BLD: 9.6 %
MONOCYTES ABSOLUTE: 0.3 THOU/MM3 (ref 0.4–1.3)
NUCLEATED RED BLOOD CELLS: 0 /100 WBC
PLATELET # BLD: 237 THOU/MM3 (ref 130–400)
PMV BLD AUTO: 8.6 FL (ref 9.4–12.4)
RBC # BLD: 3.81 MILL/MM3 (ref 4.7–6.1)
SEG NEUTROPHILS: 42.1 %
SEGMENTED NEUTROPHILS ABSOLUTE COUNT: 1.5 THOU/MM3 (ref 1.8–7.7)
WBC # BLD: 3.6 THOU/MM3 (ref 4.8–10.8)

## 2021-12-07 PROCEDURE — 1200000003 HC TELEMETRY R&B

## 2021-12-07 PROCEDURE — 6370000000 HC RX 637 (ALT 250 FOR IP): Performed by: INTERNAL MEDICINE

## 2021-12-07 PROCEDURE — 6370000000 HC RX 637 (ALT 250 FOR IP): Performed by: REGISTERED NURSE

## 2021-12-07 PROCEDURE — 6360000002 HC RX W HCPCS: Performed by: REGISTERED NURSE

## 2021-12-07 PROCEDURE — 85025 COMPLETE CBC W/AUTO DIFF WBC: CPT

## 2021-12-07 PROCEDURE — 2580000003 HC RX 258: Performed by: REGISTERED NURSE

## 2021-12-07 PROCEDURE — 6360000002 HC RX W HCPCS: Performed by: INTERNAL MEDICINE

## 2021-12-07 PROCEDURE — 36415 COLL VENOUS BLD VENIPUNCTURE: CPT

## 2021-12-07 PROCEDURE — 2580000003 HC RX 258: Performed by: INTERNAL MEDICINE

## 2021-12-07 RX ADMIN — ACETAMINOPHEN 650 MG: 325 TABLET ORAL at 06:34

## 2021-12-07 RX ADMIN — ACETAMINOPHEN 650 MG: 325 TABLET ORAL at 21:14

## 2021-12-07 RX ADMIN — METRONIDAZOLE 500 MG: 500 TABLET ORAL at 13:57

## 2021-12-07 RX ADMIN — METRONIDAZOLE 500 MG: 500 TABLET ORAL at 05:47

## 2021-12-07 RX ADMIN — SODIUM CHLORIDE, PRESERVATIVE FREE 10 ML: 5 INJECTION INTRAVENOUS at 08:43

## 2021-12-07 RX ADMIN — CEFTRIAXONE SODIUM 1000 MG: 1 INJECTION, POWDER, FOR SOLUTION INTRAMUSCULAR; INTRAVENOUS at 15:42

## 2021-12-07 RX ADMIN — FERROUS SULFATE TAB 325 MG (65 MG ELEMENTAL FE) 325 MG: 325 (65 FE) TAB at 15:42

## 2021-12-07 RX ADMIN — METRONIDAZOLE 500 MG: 500 TABLET ORAL at 21:14

## 2021-12-07 RX ADMIN — ACETAMINOPHEN 650 MG: 325 TABLET ORAL at 00:37

## 2021-12-07 RX ADMIN — FERROUS SULFATE TAB 325 MG (65 MG ELEMENTAL FE) 325 MG: 325 (65 FE) TAB at 08:43

## 2021-12-07 RX ADMIN — ACETAMINOPHEN 650 MG: 325 TABLET ORAL at 15:07

## 2021-12-07 RX ADMIN — ENOXAPARIN SODIUM 40 MG: 100 INJECTION SUBCUTANEOUS at 21:13

## 2021-12-07 RX ADMIN — SODIUM CHLORIDE, PRESERVATIVE FREE 10 ML: 5 INJECTION INTRAVENOUS at 21:17

## 2021-12-07 ASSESSMENT — PAIN SCALES - GENERAL
PAINLEVEL_OUTOF10: 3
PAINLEVEL_OUTOF10: 4
PAINLEVEL_OUTOF10: 3

## 2021-12-07 ASSESSMENT — PAIN DESCRIPTION - ORIENTATION: ORIENTATION: LOWER

## 2021-12-07 ASSESSMENT — PAIN DESCRIPTION - PAIN TYPE: TYPE: ACUTE PAIN

## 2021-12-07 ASSESSMENT — PAIN DESCRIPTION - LOCATION: LOCATION: BACK

## 2021-12-07 NOTE — PROGRESS NOTES
Progress note      [facility]             Patient:  Brooks Britton  YOB: 1950    MRN: [id number]   Acct:  [de-identified]   7K-03/003-A  Primary Care Physician: Teodora Canas MD    Admit Date: 11/29/2021           Subjective:      Doing well      Objective:      Physical Exam:    Vitals:  Patient Vitals for the past 24 hrs:   BP Temp Temp src Pulse Resp SpO2   12/07/21 0352 138/79 97.6 °F (36.4 °C) Oral 71 18 98 %   12/06/21 2100 113/65 97.9 °F (36.6 °C) Oral 70 18 98 %   12/06/21 2052 118/67 97.9 °F (36.6 °C) Oral 71 18 98 %   12/06/21 1630 135/82 98.2 °F (36.8 °C) Oral 79 18 99 %   12/06/21 0905 106/65 98.1 °F (36.7 °C) Oral 75 18 100 %     Weight: Weight: 138 lb 1.6 oz (62.6 kg)     24 hour intake/output:    Intake/Output Summary (Last 24 hours) at 12/7/2021 0744  Last data filed at 12/7/2021 0352  Gross per 24 hour   Intake 360 ml   Output 325 ml   Net 35 ml       General appearance - alert, well appearing, and in no distress  Eyes - pupils equal and reactive, extraocular eye movements intact  Mouth - mucous membranes moist, pharynx normal without lesions  Neck - supple, no significant adenopathy  Chest - clear to auscultation, no wheezes, rales or rhonchi, symmetric air entry  Heart - normal rate, regular rhythm, normal S1, S2, no murmurs, rubs, clicks or gallops  Abdomen - soft,  Mildly tender to palpation RUQ, nondistended, no masses or organomegaly, pos bs.  Drain in place right flank  Neurological - alert, oriented, normal speech, no focal findings or movement disorder noted  Musculoskeletal - no joint tenderness, deformity or swelling  Extremities - peripheral pulses normal, no pedal edema, no clubbing or cyanosis  Skin - normal coloration and turgor, no rashes, no suspicious skin lesions noted    Review of Labs and Diagnostic Testing:    CBC:   Recent Labs     12/07/21  0539   WBC 3.6*   HGB 9.7*   HCT 33.4* MCV 87.7        BMP:   Recent Labs     12/06/21  0541      K 4.1      CO2 28   BUN 9   CREATININE 0.6   CALCIUM 9.0   GLUCOSE 88     PT/INR: No results for input(s): PROTIME, INR in the last 72 hours. APTT: No results for input(s): APTT in the last 72 hours. Lipids:   Recent Labs     12/05/21  0838   ALKPHOS 72   ALT 7*   AST 18   BILITOT 0.2*   LABALBU 3.2*     Troponin:   No results for input(s): TROPONINT in the last 72 hours. Imaging:  [unfilled]    CT ABDOMEN & PELVIS 12/6/2021  1. Marked interval reduction in the size of the subdiaphragmatic/right posterior pararenal space abscess. 2. Otherwise, no other significant change from the prior CT. Other findings as described above.             EKG:      Diet: ADULT ORAL NUTRITION SUPPLEMENT; Breakfast, Dinner; Standard 4 oz Oral Supplement  ADULT ORAL NUTRITION SUPPLEMENT; Lunch; Frozen Oral Supplement  ADULT DIET; Regular        Data:   Scheduled Meds: Scheduled Meds:   ferrous sulfate  325 mg Oral BID WC    cefTRIAXone (ROCEPHIN) IV  1,000 mg IntraVENous Q24H    metroNIDAZOLE  500 mg Oral 3 times per day    sodium chloride flush  5-40 mL IntraVENous 2 times per day    enoxaparin  40 mg SubCUTAneous Daily     Continuous Infusions:   sodium chloride Stopped (11/30/21 1540)     PRN Meds:.sodium chloride flush, sodium chloride, acetaminophen **OR** acetaminophen, promethazine **OR** ondansetron  Continuous Infusions:   sodium chloride Stopped (11/30/21 1540)         Assessment/Plan   1.  Intra-abdominal abscess  -S/P IR drainage of abscess and also CT guided drain in place (  Polymicrobial growth)Significant reduction on most recent CT of the size of the abscess; Zosyn was  replaced with oral Flagyl and IV Rocephin on account of leukopenia over the weekend andThere is an acute shortage of IV Flagyl plan at this time is for removal of the drain  and anticipate discharge tomorrow on oral Flagyl on Keflex for an additional 2 weeks per ID service. -   Seen by GI  service see on account of recurrent intra-abdominal abscess. Colonoscopy is planned down the line as OP. Case had been discussed with general surgery who felt lung all indication for General surgery at this point. -ID following  -Echo WNL; no signs of endocarditis  - plan repeat CT scan abdomen and pelvis  today    2. Pulmonary nodules   -Pulmonary following; CT chest showed sub nodules 5mm recommend follow up in 1 year     3. Weight loss and poor appetite  - oral intake has improved  -CEA WNL    4.  DVT prophylaxis   -Lovenox           Electronically signed by Arti Gamble MD on 12/7/2021 at 7:44 AM

## 2021-12-07 NOTE — PROGRESS NOTES
Progress note: Infectious diseases    Patient - Lashell Root,  Age - 66 y.o.    - 1950      Room Number - 7K-03/003-A   MRN -  [id number]   Acct # - [de-identified]  Date of Admission -  2021 11:25 AM    SUBJECTIVE:   No new  No drainage from the drain  No fever    OBJECTIVE   VITALS    height is 5' 8\" (1.727 m) and weight is 138 lb 1.6 oz (62.6 kg). His oral temperature is 97.5 °F (36.4 °C). His blood pressure is 116/68 and his pulse is 69. His respiration is 18 and oxygen saturation is 99%.        Wt Readings from Last 3 Encounters:   21 138 lb 1.6 oz (62.6 kg)   20 135 lb 9.6 oz (61.5 kg)       I/O (24 Hours)    Intake/Output Summary (Last 24 hours) at 2021 1101  Last data filed at 2021 0352  Gross per 24 hour   Intake 360 ml   Output 325 ml   Net 35 ml       General Appearance  Awake, alert, oriented,  not  In acute distress  HEENT - normocephalic, atraumatic, slighlty pale conjunctiva,  anicteric sclera  Neck - Supple, no mass  Lungs -  Bilateral   air entry, no rhonchi, no wheeze  Cardiovascular - Heart sounds are normal.     Abdomen - soft, not distended, nontender, drain over the right flank area     Neurologic -oriented  Skin - No bruising or bleeding  Extremities - No edema, no cyanosis, clubbing     MEDICATIONS:      ferrous sulfate  325 mg Oral BID WC    cefTRIAXone (ROCEPHIN) IV  1,000 mg IntraVENous Q24H    metroNIDAZOLE  500 mg Oral 3 times per day    sodium chloride flush  5-40 mL IntraVENous 2 times per day    enoxaparin  40 mg SubCUTAneous Daily      sodium chloride Stopped (21 1540)     sodium chloride flush, sodium chloride, acetaminophen **OR** acetaminophen, promethazine **OR** ondansetron       Problem list of patient:     Patient Active Problem List   Diagnosis Code    Abscess of abdominal cavity (HCC) K65.1    Weight loss, unintentional R63.4    Lower abdominal pain R10.30    Moderate malnutrition (HCC) E44.0    Lung nodule R91.1    Severe malnutrition (HCC) E43    Chronic anemia D64.9    Other neutropenia (HCC) D70.8         ASSESSMENT/PLAN   Abscess over the right kidney /liver area: he has a drain placed. Leukopenia better  Continue iv rocephin and flagyl  Will plan discharge with oral flagyl and keflex for two wks  Advised to schedule follow up with GI for Colonoscopy  Remove drain tomorrow.   Deanna Marroquin MD, MD, FACP 12/7/2021 11:01 AM

## 2021-12-08 VITALS
HEART RATE: 76 BPM | BODY MASS INDEX: 20.93 KG/M2 | OXYGEN SATURATION: 98 % | DIASTOLIC BLOOD PRESSURE: 65 MMHG | SYSTOLIC BLOOD PRESSURE: 114 MMHG | HEIGHT: 68 IN | RESPIRATION RATE: 18 BRPM | TEMPERATURE: 97.7 F | WEIGHT: 138.1 LBS

## 2021-12-08 LAB
BASOPHILS # BLD: 1.2 %
BASOPHILS ABSOLUTE: 0 THOU/MM3 (ref 0–0.1)
EOSINOPHIL # BLD: 3 %
EOSINOPHILS ABSOLUTE: 0.1 THOU/MM3 (ref 0–0.4)
ERYTHROCYTE [DISTWIDTH] IN BLOOD BY AUTOMATED COUNT: 18.2 % (ref 11.5–14.5)
ERYTHROCYTE [DISTWIDTH] IN BLOOD BY AUTOMATED COUNT: 55.2 FL (ref 35–45)
HCT VFR BLD CALC: 33.6 % (ref 42–52)
HEMOGLOBIN: 9.8 GM/DL (ref 14–18)
IMMATURE GRANS (ABS): 0.02 THOU/MM3 (ref 0–0.07)
IMMATURE GRANULOCYTES: 0.6 %
LYMPHOCYTES # BLD: 45 %
LYMPHOCYTES ABSOLUTE: 1.5 THOU/MM3 (ref 1–4.8)
MCH RBC QN AUTO: 26.1 PG (ref 26–33)
MCHC RBC AUTO-ENTMCNC: 29.2 GM/DL (ref 32.2–35.5)
MCV RBC AUTO: 89.4 FL (ref 80–94)
MONOCYTES # BLD: 10.9 %
MONOCYTES ABSOLUTE: 0.4 THOU/MM3 (ref 0.4–1.3)
NUCLEATED RED BLOOD CELLS: 0 /100 WBC
PLATELET # BLD: 225 THOU/MM3 (ref 130–400)
PMV BLD AUTO: 8.4 FL (ref 9.4–12.4)
RBC # BLD: 3.76 MILL/MM3 (ref 4.7–6.1)
SCAN OF BLOOD SMEAR: NORMAL
SEG NEUTROPHILS: 39.3 %
SEGMENTED NEUTROPHILS ABSOLUTE COUNT: 1.3 THOU/MM3 (ref 1.8–7.7)
WBC # BLD: 3.4 THOU/MM3 (ref 4.8–10.8)

## 2021-12-08 PROCEDURE — 6370000000 HC RX 637 (ALT 250 FOR IP): Performed by: INTERNAL MEDICINE

## 2021-12-08 PROCEDURE — 6370000000 HC RX 637 (ALT 250 FOR IP): Performed by: REGISTERED NURSE

## 2021-12-08 PROCEDURE — 85025 COMPLETE CBC W/AUTO DIFF WBC: CPT

## 2021-12-08 PROCEDURE — 36415 COLL VENOUS BLD VENIPUNCTURE: CPT

## 2021-12-08 PROCEDURE — 2580000003 HC RX 258: Performed by: REGISTERED NURSE

## 2021-12-08 RX ORDER — CEPHALEXIN 500 MG/1
500 CAPSULE ORAL 4 TIMES DAILY
Qty: 56 CAPSULE | Refills: 0 | Status: SHIPPED | OUTPATIENT
Start: 2021-12-08 | End: 2021-12-22

## 2021-12-08 RX ORDER — METRONIDAZOLE 500 MG/1
500 TABLET ORAL 3 TIMES DAILY
Qty: 42 TABLET | Refills: 0 | Status: SHIPPED | OUTPATIENT
Start: 2021-12-08 | End: 2021-12-22

## 2021-12-08 RX ORDER — FERROUS SULFATE 325(65) MG
325 TABLET ORAL 2 TIMES DAILY WITH MEALS
Qty: 30 TABLET | Refills: 3 | Status: SHIPPED | OUTPATIENT
Start: 2021-12-08 | End: 2022-01-12

## 2021-12-08 RX ADMIN — FERROUS SULFATE TAB 325 MG (65 MG ELEMENTAL FE) 325 MG: 325 (65 FE) TAB at 08:18

## 2021-12-08 RX ADMIN — SODIUM CHLORIDE, PRESERVATIVE FREE 10 ML: 5 INJECTION INTRAVENOUS at 08:18

## 2021-12-08 RX ADMIN — ACETAMINOPHEN 650 MG: 325 TABLET ORAL at 10:36

## 2021-12-08 RX ADMIN — ACETAMINOPHEN 650 MG: 325 TABLET ORAL at 03:50

## 2021-12-08 RX ADMIN — METRONIDAZOLE 500 MG: 500 TABLET ORAL at 05:53

## 2021-12-08 ASSESSMENT — PAIN SCALES - GENERAL
PAINLEVEL_OUTOF10: 3
PAINLEVEL_OUTOF10: 3
PAINLEVEL_OUTOF10: 4

## 2021-12-08 ASSESSMENT — PAIN DESCRIPTION - PAIN TYPE: TYPE: ACUTE PAIN

## 2021-12-08 NOTE — CARE COORDINATION
12/8/21, 10:44 AM EST    Patient goals/plan/ treatment preferences discussed by  and . Patient goals/plan/ treatment preferences reviewed with patient/ family. Patient/ family verbalize understanding of discharge plan and are in agreement with goal/plan/treatment preferences. Understanding was demonstrated using the teach back method. AVS provided by RN at time of discharge, which includes all necessary medical information pertaining to the patients current course of illness, treatment, post-discharge goals of care, and treatment preferences. Planning to go home with wife on oral antibiotics. Continue outpatient workup. [facility] assistance as needed.

## 2021-12-08 NOTE — PROGRESS NOTES
Progress note: Infectious diseases    Patient - Jelena Samano,  Age - 66 y.o.    - 1950      Room Number - 7K-03/003-A   MRN -  [id number]   Acct # - [de-identified]  Date of Admission -  2021 11:25 AM    SUBJECTIVE:   He is feeling better. Ready to go home    OBJECTIVE   VITALS    height is 5' 8\" (1.727 m) and weight is 138 lb 1.6 oz (62.6 kg). His oral temperature is 97.7 °F (36.5 °C). His blood pressure is 114/65 and his pulse is 76. His respiration is 18 and oxygen saturation is 98%. Wt Readings from Last 3 Encounters:   21 138 lb 1.6 oz (62.6 kg)   20 135 lb 9.6 oz (61.5 kg)     General Appearance  Awake, alert, oriented,  not  In acute distress. HEENT - normocephalic, atraumatic, slighlty pale conjunctiva,  anicteric sclera  Neck - Supple, no mass. Lungs -  Bilateral   air entry, no rhonchi, no wheeze. Cardiovascular - Heart sounds are normal.     Abdomen - soft, not distended, non tender.   Neurologic -oriented  Skin - No bruising or bleeding  Extremities - No edema, no cyanosis, clubbing     MEDICATIONS:      ferrous sulfate  325 mg Oral BID WC    cefTRIAXone (ROCEPHIN) IV  1,000 mg IntraVENous Q24H    metroNIDAZOLE  500 mg Oral 3 times per day    sodium chloride flush  5-40 mL IntraVENous 2 times per day    enoxaparin  40 mg SubCUTAneous Daily      sodium chloride Stopped (21 1540)     sodium chloride flush, sodium chloride, acetaminophen **OR** acetaminophen, promethazine **OR** ondansetron       Problem list of patient:     Patient Active Problem List   Diagnosis Code    Abscess of abdominal cavity (HCC) K65.1    Weight loss, unintentional R63.4    Lower abdominal pain R10.30    Moderate malnutrition (HCC) E44.0    Lung nodule R91.1    Severe malnutrition (HCC) E43    Chronic anemia D64.9    Other neutropenia (HCC) D70.8         ASSESSMENT/PLAN   Abscess over the right kidney /liver area: the drainage tube was removed    He will be discharged with oral keflex and flagyl  He will schedule for colonoscopy  Namita Alas MD, MD, FACP 12/8/2021 12:38 PM

## 2021-12-08 NOTE — DISCHARGE SUMMARY
NUTRITION SUPPLEMENT; Breakfast, Dinner; Standard 4 oz Oral Supplement  ADULT ORAL NUTRITION SUPPLEMENT; Lunch; Frozen Oral Supplement  ADULT DIET; Regular    Activity:  Activity as tolerated (Patient may move about with assist as indicated or with supervision.)    Follow-up:  in the next few weeks with Nhan Pearson MD,  in the next few month with pulm, as scheduled with GI     Consultants:  GI, ID, Pulm    Procedures:  IR drainage of abscess, CT guided drain placement in abscess      Objective:  Lab Results   Component Value Date    WBC 3.4 12/08/2021    RBC 3.76 12/08/2021    HGB 9.8 12/08/2021    HCT 33.6 12/08/2021    MCV 89.4 12/08/2021    MCH 26.1 12/08/2021    MCHC 29.2 12/08/2021     12/08/2021    MPV 8.4 12/08/2021     Lab Results   Component Value Date     12/06/2021    K 4.1 12/06/2021    K 4.0 11/30/2021     12/06/2021    CO2 28 12/06/2021    BUN 9 12/06/2021    CREATININE 0.6 12/06/2021    GLUCOSE 88 12/06/2021    CALCIUM 9.0 12/06/2021     Lab Results   Component Value Date    CALCIUM 9.0 12/06/2021     No results found for: IONCA  No results found for: MG  No results found for: PHOS  No results found for: BNP  Lab Results   Component Value Date    ALKPHOS 72 12/05/2021    ALT 7 12/05/2021    AST 18 12/05/2021    PROT 6.7 12/05/2021    BILITOT 0.2 12/05/2021    BILIDIR <0.2 11/29/2021    LABALBU 3.2 12/05/2021     Lab Results   Component Value Date    LACTA 1.2 12/02/2020     No results found for: AMYLASE  Lab Results   Component Value Date    LIPASE 27.9 11/29/2021     No results found for: CHOL, TRIG, HDL, LDLCALC  No results for input(s): POCGLU in the last 72 hours. No results for input(s): CKTOTAL, CKMB, TROPONINI in the last 72 hours.   No results found for: LABA1C  No results found for: INR, PROTIME  No results found for: PHART, PO2ART, MYX6VGX, GAR9YXI, [facility] Course: clinical course has improved    Initial H+P: The patient is a 66 y.o. male with no significant pmhx who presents with abdominal pain over the past few months with lack of appetite. He had similar symptoms this time last year and was tx for abdominal abscess--drained by IR. He did have unintentional weight loss at that time also, which has continued this year. He denies any chest pain, SOB, nausea, vomiting, diarrhea. His only complaint is diffuse abdominal pain and poor intake. In the emergency department CT abdomen shows 9.3X9.5X3.8cm abcess right posterior pararenal space near hepatic lobe and 5mm pulm nodules with mildly enlarged caridophrenic lymph nodes. His LFT's are ok, EKG ok. Pt is afebrile, VSS. He received 1L Bolus. Patient admitted for intra abdominal abscess. Case discussed with ID and suggested Zosyn and Vanc due to hx of Staph and E coli grown in the abscess last year. IR will drain tomorrow. Pt is ok with CPR and cardioversion but no intubation. Pt was seen by ID and placed on broad spectrum abx initially. He had drainage by IR and then subsequently a CT guided drain placement, which was removed prior to d/c. Cultures grew multiple microbes including viridans streptococcus, e coli and mixed anaerobic growth. He remained on IV rocephin and flagyl while inpt and will continue PO Keflex and PO flagyl at discharge for 2 weeks based on ID recommendations. He was also seen by pulm for pulm nodules, who recommend follow up CT in 1 year.  Pt stable for d.c        Vitals: /65   Pulse 76   Temp 97.7 °F (36.5 °C) (Oral)   Resp 18   Ht 5' 8\" (1.727 m)   Wt 138 lb 1.6 oz (62.6 kg)   SpO2 98%   BMI 21.00 kg/m²   Physical Exam:  General appearance - alert, well appearing, and in no distress  Eyes - pupils equal and reactive, extraocular eye movements intact  Neck - supple, no significant adenopathy  Chest - clear to auscultation, no wheezes, rales or rhonchi, symmetric air entry  Heart - normal rate, regular rhythm, normal S1, S2, no murmurs, rubs, clicks or gallops  Abdomen - soft, nontender, nondistended, no masses or organomegaly pos bs:   Neurological - alert, oriented, normal speech, no focal findings or movement disorder noted  Extremities - peripheral pulses normal, no pedal edema,       Disposition: home    Condition: Stable    Over 35 minutes spent on encounter    Assessment and plan of care discussed with supervising physician, Dr Wellington Sorensen. SOLO Paz - DONALDO     Addendum/attestation by Xander Rene MD:  I have seen and examined the patient independently. Face to face evaluation and examination was performed. The above evaluation and note has been reviewed. Laboratory and radiological data were reviewed. I Have discussed with Alexander Alanis CNP about this patient in detail. The above assessment and plan has been reviewed. Please see my modifications mentioned below.       My modifications:  Electronically signed by Amy Aden MD on 12/8/2021 at 7:21 PM       Copy: Primary Care Physician: Tyler James MD  Internal Medicine

## 2021-12-08 NOTE — PROGRESS NOTES
Discharge instructions reviewed with pt. Time spent at length reviewing antibiotic side effects with verbal understanding.  Discharged with all personal belongings

## 2021-12-29 NOTE — PROGRESS NOTES
diseases including Systemic lupus Erythematosus, Rheumatoid arthritis etc:NO  Any of his first-degree family relative ever diagnosed with Lung cancer:NO  He ever exposed to radon, or uranium in the past: NO    He is currently using any oxygen supplementation at rest, exercise or during sleep/at night time:NO    He ever had a Colonoscopy performed: It was done more than 15 years back. It was reported negative urine. He underwent colonoscopy in [address] [place] [address] Highway  He ever had his prostate check exam performed:no        Social History:  Occupation:  He is current working: No  Type of profession: retired. Social History     Tobacco Use    Smoking status: Never Smoker    Smokeless tobacco: Never Used   Substance Use Topics    Alcohol use: Never    Drug use: Never       History of recreational or IV drug use in the past:NO  History of Alcohol use: No.       History of exposure to coal mines/coal dust: NO  History of exposure to foundry dust/welding: NO  History of exposure to quarry/silica/sandblasting: NO  History of exposure to asbestos/working with breaks/ships: NO  History of exposure to farm dust: NO  History of recent travel to long distances: NO  History of exposure to birds, pigeons, or chickens in the past:NO  Pet animals at home:No     History of pulmonary embolism in the past: No            History of DVT in the past:No                        Review of Systems:   General/Constitutional: He gained approximately 8 pounds of weight in the last current of month with a normal appetite. No fever or chills. HENT: Negative. Eyes: Negative. Upper respiratory tract: No nasal stuffiness or post nasal drip. Lower respiratory tract/ lungs: No cough or sputum production. No hemoptysis. Cardiovascular: No palpitations or chest pain. Gastrointestinal: No nausea or vomiting. Neurological: No focal neurologiacal weakness. Extremities: No edema. Musculoskeletal: No complaints.   Genitourinary: No complaints. Hematological: Negative. Psychiatric/Behavioral: Negative. Skin: No itching. Current Medications:        Past Medical History:   Diagnosis Date    [facility]erebral artery occlusion with cerebral infarction ([facility] [address]     Chronic anemia 12/4/2021    Hypertension     Other neutropenia ([facility] [address] 12/5/2021       Past Surgical History:   Procedure Laterality Date    APPENDECTOMY      CHOLECYSTECTOMY      COLONOSCOPY      CT ASP ABS HEMATOMA BULLA CYST  11/30/2021    CT ASP ABS HEMATOMA BULLA CYST 11/30/2021 [facility] CT SCAN    FRACTURE SURGERY      HERNIA REPAIR      JOINT REPLACEMENT         No Known Allergies    Current Outpatient Medications   Medication Sig Dispense Refill    acetaminophen (TYLENOL) 500 MG tablet Take 500 mg by mouth every 6 hours as needed for Pain       No current facility-administered medications for this visit. No family history on file. Physical Exam     VITALS:  /68 (Site: Left Upper Arm, Position: Sitting, Cuff Size: Medium Adult)   Pulse 89   Temp 98.2 °F (36.8 °C)   Ht 5' 8\" (1.727 m)   Wt 133 lb 3.2 oz (60.4 kg)   SpO2 99% Comment: room air at rest  BMI 20.25 kg/m²   Nursing note and vitals reviewed. Constitutional: Patient appears moderately built and moderately nourished. No distress. Patient is oriented to person, place, and time. HENT:   Head: Normocephalic and atraumatic. Right Ear: External ear normal.   Left Ear: External ear normal.   Mouth/Throat: Oropharynx is clear and moist.  No oral thrush. Eyes: Conjunctivae are normal. Pupils are equal, round, and reactive to light. No scleral icterus. Neck: Neck supple. No JVD present. No tracheal deviation present. Cardiovascular: Normal rate, regular rhythm, normal heart sounds. No murmur heard. Pulmonary/Chest: Effort normal and breath sounds normal. No stridor. No respiratory distress. No wheezes. No rales. Patient exhibits no tenderness. Abdominal: Soft.  Patient exhibits no distension. No tenderness. Musculoskeletal: Normal range of motion. Extremities: Patient exhibits no edema and no tenderness. Lymphadenopathy:  No cervical adenopathy. Neurological: Patient is alert and oriented to person, place, and time. Skin: Skin is warm and dry. Patient is not diaphoretic. Psychiatric: Patient  has a normal mood and affect. Patient behavior is normal.     Neck Circumference -   14  Mallampati - 3    Diagnostic Data:    Radiological Data:  CXR  2VW CXR 11/29/21:  Patchy right lower lobe airspace opacity and small right pleural effusion suggest pneumonia in the appropriate clinical setting. Follow-up to ensure resolution is advised.        CT Scans   CT CHEST W CONTRAST   11/30/2021   Impression:  1. Again seen is the abscess collection in the right posterior pararenal space. 2. Sub-5 mm pulmonary nodules are seen. Consider a follow-up evaluation in one year. 3. Very small right pleural effusion. Bibasilar atelectasis, right greater than left. 4. Other findings as described above.        CT abdomen and pelvis with IV contrast 11/29/21:  A miniscule right pleural effusion is seen. There is right lung base subsegmental atelectasis. A 3 mm right middle lobe pulmonary nodule is seen (series 2, image 10). A subpleural 5 mm right middle lobe pulmonary nodule (images 16). Another subpleural 2   mm right middle lobe pulmonary nodule (image image 20). Calcified granuloma seen in the left lung base. There is a 4 mm lingular pulmonary nodule (image 21). A 4 mm subpleural left lower lobe pulmonary nodule (image 21). Platelike atelectasis is seen in   the left lung base. The ascending thoracic aorta is ectatic at 4.1 cm. Coronary calcifications are noted. There is a 1.5 cm mildly enlarged lymph node in the right epicardial fat. There is a 9.3 x 9.5 x 3.8 cm collection with air bubbles within the in the right posterior pararenal space posterior to the right hepatic lobe.  This is consistent with an abscess. The gallbladder is surgically absent. Splenule is present. Stool is seen   within the colon. Aortoiliac calcifications. There is a 9 mm calculus in the right renal sinus. Mild right urothelial thickening. No hydronephrosis  No significantly enlarged lymph nodes are seen. The bladder is grossly unremarkable. The prostate is not enlarged. Prostatic calcifications are seen. Bones: The bones are demineralized. Degenerative changes of the thoracolumbar spine. Minimal spondylolisthesis of L5. Left hip arthroplasty has been performed. Intramedullary ruth ann and screw fixation of the right femur is seen. Pulmonary function tests:  None in Epic    Echocardiogram:  11/29/21   Conclusions      Summary   Ejection fraction is visually estimated at 55%.   Overall left ventricular function is normal.      Signature      ----------------------------------------------------------------   Electronically signed by Irene Melara MD (Interpreting   physician) on 11/30/2021 at 07:42 PM   ----------------------------------------------------------------        No old CT scan chest films were available/Not performed in the past from [place], [place]. Assessment:  -Multiple pulmonary nodules including: 3 mm right middle lobe pulmonary nodule, subpleural 5 mm right middle lobe pulmonary nodule, subpleural 2mm right middle lobe pulmonary nodule, 4 mm lingular pulmonary nodule, 4 mm subpleural left lower lobe pulmonary nodule-no old records were available from [address]  -1.5 cm mildly enlarged lymph node in the right epicardial fat   -Right-sided retroperitoneal abscess. He underwent drain placement by interventional radiology service which was subsequently removed.   He was treated with antibiotics by infectious disease service  -Calcified granuloma in left lung base   -Atelectasis of left lung base   -Small right pleural effusion-too small for any intervention at this time  -History of TB exposure   -Hx of CVA  -Essential HTN  -Hx of shingles on his back  -Unintentional weight loss   -Limited Code     Recommendations/Plan:  -Schedule patient for CT scan of chest with out IV contrast in 1year -scheduled on 8 December 2022 to follow abnormal CT Chest with multiple subcentimeter lung nodules. The largest nodule is of 5 mm in size in the right middle lobe  -Surinder Perea was advised to make early appointment with my clinic if he develops any constitutional symptoms including loss of weight, poor appetite or hemoptysis. He verbalizes under standing.  -He was advised to keep his scheduled appointments with the infectious disease specialist Dr. Jenny Santiago MD and GI service as planned.  -He is currently waiting for follow-up with Dr. Ijeoma Luna MD to schedule colonoscopy. -Patient and his wife were educated about my impression and plan. They verbalizes understanding.      -I personally reviewed updated the Past medical hx, Past surgical hx,Social hx, Family hx, Medications, Allergies in the discrete data section of the patient chart along with labs, Pulmonary medicine,Sleep medicine related, Pathological, Microbiological and Radiological investigations.

## 2022-01-12 ENCOUNTER — OFFICE VISIT (OUTPATIENT)
Dept: PULMONOLOGY | Age: 72
End: 2022-01-12
Payer: COMMERCIAL

## 2022-01-12 VITALS
TEMPERATURE: 98.2 F | HEIGHT: 68 IN | HEART RATE: 89 BPM | WEIGHT: 133.2 LBS | BODY MASS INDEX: 20.19 KG/M2 | SYSTOLIC BLOOD PRESSURE: 118 MMHG | OXYGEN SATURATION: 99 % | DIASTOLIC BLOOD PRESSURE: 68 MMHG

## 2022-01-12 DIAGNOSIS — R91.8 MULTIPLE LUNG NODULES ON CT: Primary | ICD-10-CM

## 2022-01-12 DIAGNOSIS — R91.1 LUNG NODULE: ICD-10-CM

## 2022-01-12 PROCEDURE — 99214 OFFICE O/P EST MOD 30 MIN: CPT | Performed by: INTERNAL MEDICINE

## 2022-01-12 RX ORDER — ACETAMINOPHEN 500 MG
500 TABLET ORAL EVERY 6 HOURS PRN
COMMUNITY

## 2022-01-12 NOTE — PROGRESS NOTES
Neck Circumference -   14  Mallampati - 3    Lung Nodule Screening     [] Qualifies    [x] Does not qualify   [] Declined    [] Completed

## 2022-02-16 ENCOUNTER — HOSPITAL ENCOUNTER (OUTPATIENT)
Age: 72
Setting detail: OUTPATIENT SURGERY
Discharge: HOME | End: 2022-02-16
Attending: INTERNAL MEDICINE | Admitting: INTERNAL MEDICINE
Payer: COMMERCIAL

## 2022-02-16 ENCOUNTER — ANESTHESIA EVENT (OUTPATIENT)
Dept: ENDOSCOPY | Age: 72
End: 2022-02-16
Payer: COMMERCIAL

## 2022-02-16 ENCOUNTER — ANESTHESIA (OUTPATIENT)
Dept: ENDOSCOPY | Age: 72
End: 2022-02-16
Payer: COMMERCIAL

## 2022-02-16 VITALS
HEART RATE: 72 BPM | RESPIRATION RATE: 16 BRPM | SYSTOLIC BLOOD PRESSURE: 106 MMHG | TEMPERATURE: 97.4 F | BODY MASS INDEX: 19.58 KG/M2 | OXYGEN SATURATION: 98 % | DIASTOLIC BLOOD PRESSURE: 63 MMHG | WEIGHT: 129.2 LBS | HEIGHT: 68 IN

## 2022-02-16 VITALS
DIASTOLIC BLOOD PRESSURE: 70 MMHG | RESPIRATION RATE: 19 BRPM | SYSTOLIC BLOOD PRESSURE: 117 MMHG | OXYGEN SATURATION: 99 %

## 2022-02-16 PROCEDURE — 6360000002 HC RX W HCPCS: Performed by: NURSE ANESTHETIST, CERTIFIED REGISTERED

## 2022-02-16 PROCEDURE — 3700000001 HC ADD 15 MINUTES (ANESTHESIA): Performed by: INTERNAL MEDICINE

## 2022-02-16 PROCEDURE — 2500000003 HC RX 250 WO HCPCS: Performed by: NURSE ANESTHETIST, CERTIFIED REGISTERED

## 2022-02-16 PROCEDURE — 7100000011 HC PHASE II RECOVERY - ADDTL 15 MIN: Performed by: INTERNAL MEDICINE

## 2022-02-16 PROCEDURE — 3609010300 HC COLONOSCOPY W/BIOPSY SINGLE/MULTIPLE: Performed by: INTERNAL MEDICINE

## 2022-02-16 PROCEDURE — 3700000000 HC ANESTHESIA ATTENDED CARE: Performed by: INTERNAL MEDICINE

## 2022-02-16 PROCEDURE — 2720000010 HC SURG SUPPLY STERILE: Performed by: INTERNAL MEDICINE

## 2022-02-16 PROCEDURE — 88305 TISSUE EXAM BY PATHOLOGIST: CPT

## 2022-02-16 PROCEDURE — 7100000010 HC PHASE II RECOVERY - FIRST 15 MIN: Performed by: INTERNAL MEDICINE

## 2022-02-16 PROCEDURE — 2580000003 HC RX 258: Performed by: INTERNAL MEDICINE

## 2022-02-16 PROCEDURE — 2709999900 HC NON-CHARGEABLE SUPPLY: Performed by: INTERNAL MEDICINE

## 2022-02-16 RX ORDER — SODIUM CHLORIDE 0.9 % (FLUSH) 0.9 %
5-40 SYRINGE (ML) INJECTION EVERY 12 HOURS SCHEDULED
Status: DISCONTINUED | OUTPATIENT
Start: 2022-02-16 | End: 2022-02-16 | Stop reason: HOSPADM

## 2022-02-16 RX ORDER — PROPOFOL 10 MG/ML
INJECTION, EMULSION INTRAVENOUS PRN
Status: DISCONTINUED | OUTPATIENT
Start: 2022-02-16 | End: 2022-02-16 | Stop reason: SDUPTHER

## 2022-02-16 RX ORDER — LIDOCAINE HYDROCHLORIDE 20 MG/ML
INJECTION, SOLUTION INFILTRATION; PERINEURAL PRN
Status: DISCONTINUED | OUTPATIENT
Start: 2022-02-16 | End: 2022-02-16 | Stop reason: SDUPTHER

## 2022-02-16 RX ORDER — SODIUM CHLORIDE 9 MG/ML
25 INJECTION, SOLUTION INTRAVENOUS PRN
Status: DISCONTINUED | OUTPATIENT
Start: 2022-02-16 | End: 2022-02-16 | Stop reason: HOSPADM

## 2022-02-16 RX ORDER — SODIUM CHLORIDE 0.9 % (FLUSH) 0.9 %
5-40 SYRINGE (ML) INJECTION PRN
Status: DISCONTINUED | OUTPATIENT
Start: 2022-02-16 | End: 2022-02-16 | Stop reason: HOSPADM

## 2022-02-16 RX ADMIN — PROPOFOL 25 MG: 10 INJECTION, EMULSION INTRAVENOUS at 10:18

## 2022-02-16 RX ADMIN — SODIUM CHLORIDE 25 ML: 9 INJECTION, SOLUTION INTRAVENOUS at 09:08

## 2022-02-16 RX ADMIN — LIDOCAINE HYDROCHLORIDE 60 MG: 20 INJECTION, SOLUTION INFILTRATION; PERINEURAL at 10:08

## 2022-02-16 RX ADMIN — PROPOFOL 25 MG: 10 INJECTION, EMULSION INTRAVENOUS at 10:13

## 2022-02-16 RX ADMIN — PROPOFOL 25 MG: 10 INJECTION, EMULSION INTRAVENOUS at 10:22

## 2022-02-16 RX ADMIN — PROPOFOL 25 MG: 10 INJECTION, EMULSION INTRAVENOUS at 10:11

## 2022-02-16 RX ADMIN — PROPOFOL 25 MG: 10 INJECTION, EMULSION INTRAVENOUS at 10:09

## 2022-02-16 RX ADMIN — PROPOFOL 25 MG: 10 INJECTION, EMULSION INTRAVENOUS at 10:30

## 2022-02-16 RX ADMIN — PROPOFOL 25 MG: 10 INJECTION, EMULSION INTRAVENOUS at 10:08

## 2022-02-16 RX ADMIN — PROPOFOL 25 MG: 10 INJECTION, EMULSION INTRAVENOUS at 10:26

## 2022-02-16 RX ADMIN — PROPOFOL 25 MG: 10 INJECTION, EMULSION INTRAVENOUS at 10:15

## 2022-02-16 RX ADMIN — PROPOFOL 25 MG: 10 INJECTION, EMULSION INTRAVENOUS at 10:10

## 2022-02-16 ASSESSMENT — PAIN SCALES - GENERAL
PAINLEVEL_OUTOF10: 0
PAINLEVEL_OUTOF10: 0

## 2022-02-16 ASSESSMENT — PAIN - FUNCTIONAL ASSESSMENT: PAIN_FUNCTIONAL_ASSESSMENT: 0-10

## 2022-02-16 NOTE — PROGRESS NOTES
Recovery mode. Denies discomfort, passing gas, taking fluids. Dr. Nash Gotti discussed findings and plan of care with patient and . Discharge instructions provided, understanding verbalized.

## 2022-02-16 NOTE — ANESTHESIA POSTPROCEDURE EVALUATION
Department of Anesthesiology  Postprocedure Note    Patient: Yaneth Orourke  MRN: [id number]  YOB: 1950  Date of evaluation: 2/16/2022  Time:  10:36 AM     Procedure Summary     Date: 02/16/22 Room / Location: [address] [place] [address] / [address] [place] [address]    Anesthesia Start: 1007 Anesthesia Stop: 9550    Procedure: COLONOSCOPY WITH BIOPSY (N/A ) Diagnosis: (HX OF COLON POLYPS)    Surgeons: Sofi Fisher MD Responsible Provider: Uziel Kirkpatrick DO    Anesthesia Type: MAC ASA Status: 3          Anesthesia Type: MAC    Albin Phase I: Albin Score: 10    Albin Phase II:      Last vitals: Reviewed and per EMR flowsheets.        Anesthesia Post Evaluation    Patient location during evaluation: PACU  Patient participation: complete - patient participated  Level of consciousness: awake  Pain score: 0  Airway patency: patent  Nausea & Vomiting: no nausea and no vomiting  Complications: no  Cardiovascular status: blood pressure returned to baseline  Respiratory status: acceptable  Hydration status: euvolemic

## 2022-02-16 NOTE — BRIEF OP NOTE
Brief Postoperative Note      Patient: Ministerio Lawrence  YOB: 1950  MRN: [id number]    Date of Procedure: 2/16/2022    Pre-Op Diagnosis: HX OF COLON POLYPS history of diarrhea and history of retroperitoneal abscess    Post-Op Diagnosis: Diverticulosis biopsy obtained due to history of diarrhea 1 diverticuli of the bowel such with post in its biopsy plan to evaluate       Procedure(s):  COLONOSCOPY WITH BIOPSY    Surgeon(s):  Tung Shore MD    Assistant:  * No surgical staff found *    Anesthesia: Monitor Anesthesia Care    Estimated Blood Loss (mL): no nodesne    Complications: None    Specimens:   ID Type Source Tests Collected by Time Destination   A : bx random colon Tissue Colon SURGICAL PATHOLOGY Tung Shore MD 2/16/2022 1024        Implants:  * No implants in log *      Drains: * No LDAs found *    Findings: Diverticulosis biopsy obtained due to history of diarrhea 1 diverticuli of the bowel such with post in its biopsy plan to evaluate       Electronically signed by Tung Shore MD on 2/16/2022 at 10:48 AM

## 2022-02-16 NOTE — H&P
[address] [address]  Sedation/Analgesia History & Physical    Patient: Rosita Johnson :   [facility] Rec#: [id number] Acc#: [id number]   Provider Performing Procedure: Saira Jenkins MD  Primary Care Physician: Sunita Enriquez MD    PRE-PROCEDURE   Full CODE [x]Yes  DNR-CCA/DNR-CC []Yes   Brief History/Pre-Procedure Diagnosis:         History of colon polyps  COLONOSCOPY W/ OR W/O BIOPSY    2. History of abdominal abscess  COLONOSCOPY W/ OR W/O BIOPSY   3. History of retropharyngeal abscess  COLONOSCOPY W/ OR W/O BIOPSY              MEDICAL HISTORY  []CAD/Valve  []Liver Disease  []Lung Disease []Diabetes  []Hypertension []Renal Disease  []Additional information:       has a past medical history of Cerebral artery occlusion with cerebral infarction ([facility] [address] Chronic anemia, Hypertension, and Other neutropenia ([facility] [address] SURGICAL HISTORY   has a past surgical history that includes joint replacement; fracture surgery; hernia repair; Cholecystectomy; Appendectomy; Colonoscopy; and CT ASP ABS HEMATOMA BULLA CYST (2021). Additional information:       ALLERGIES   Allergies as of 2022    (No Known Allergies)     Additional information:       MEDICATIONS   Coumadin Use Last 7 Days [x]No []Yes  Antiplatelet drug therapy use last 7 days  [x]No []Yes  Other anticoagulant use last 7 days  [x]No []Yes    Current Facility-Administered Medications:     sodium chloride flush 0.9 % injection 5-40 mL, 5-40 mL, IntraVENous, 2 times per day, Saira Jenkins MD    sodium chloride flush 0.9 % injection 5-40 mL, 5-40 mL, IntraVENous, PRN, Saira Jenkins MD    0.9 % sodium chloride infusion, 25 mL, IntraVENous, PRN, Saira Jenkins MD, Last Rate: 100 mL/hr at 22, 25 mL at 22  Prior to Admission medications    Medication Sig Start Date End Date Taking?  Authorizing Provider   acetaminophen (TYLENOL) 500 MG tablet Take 500 mg by mouth every 6 hours as needed for Pain   Yes Historical Provider, MD   polyethylene glycol (GLYCOLAX) 17 GM/SCOOP powder Colonoscopy Prep Dispense 238 Gram Bottle. Use as Directed 1/19/22   Ijeoma Luna MD     Additional information:       PHYSICAL:   Heart:  [x]Regular rate and rhythm  []Other:    Lungs:  [x]Clear    []Other:    Abdomen: [x]Soft    []Other:    Mental Status: [x]Alert & Oriented  []Other:      VITAL SIGNS   Patient Vitals for the past 24 hrs:   BP Temp Temp src Pulse Resp SpO2 Height Weight   02/16/22 0850 122/73 97.3 °F (36.3 °C) Temporal 83 16 100 % 5' 8\" (1.727 m) 129 lb 3.2 oz (58.6 kg)       PLANNED PROCEDURE   []EGD  [x]Colonoscopy []Flex Sigmoid  []ERCP []EUS   []Cystoscopy  [] CATH [] BRONCH   Consent: I have discussed with the patient and/or the patient representative the indication, alternatives, and the possible risks and/or complications of the planned procedure and the anesthesia methods. The patient and/or patient representative appear to understand and agree to proceed. SEDATION  Planned agent:[]Midazolam []Meperidine []Sublimaze []Morphine  []Diazepam  []Other: as per anesthesia   ASA Classification:  See anesthesia note     Airway Assessment: as per anesthesia     Monitoring and Safety: The patient will be placed on a cardiac monitor and vital signs, pulse oximetry and level of consciousness will be continuously evaluated throughout the procedure. The patient will be closely monitored until recovery from the medications is complete and the patient has returned to baseline status. Respiratory therapy will be on standby during the procedure. [x]Pre-procedure diagnostic studies complete and results available. Comment:    [x]Previous sedation/anesthesia experiences assessed. Comment:    [x]The patient is an appropriate candidate to undergo the planned procedure sedation and anesthesia.  (Refer to nursing sedation/analgesia documentation record)  [x]Formulation and discussion of sedation/procedure plan, risks, and expectations with patient and/or responsible adult completed. [x]Patient examined immediately prior to the procedure.  (Refer to nursing sedation/analgesia documentation record)    Cathryn Douglass MD, MD   Electronically signed 2/16/2022 at 10:02 AM

## 2022-02-16 NOTE — ANESTHESIA PRE PROCEDURE
Department of Anesthesiology  Preprocedure Note       Name:  Rosita Johnson   Age:  66 y.o.  :  1950                                          MRN:  [id number]         Date:  2022      Surgeon: Stephany Forte):  Saira Jenkins MD    Procedure: Procedure(s):  COLONOSCOPY    Medications prior to admission:   Prior to Admission medications    Medication Sig Start Date End Date Taking? Authorizing Provider   acetaminophen (TYLENOL) 500 MG tablet Take 500 mg by mouth every 6 hours as needed for Pain   Yes Historical Provider, MD   polyethylene glycol (GLYCOLAX) 17 GM/SCOOP powder Colonoscopy Prep Dispense 238 Gram Bottle.   Use as Directed 22   Saira Jenkins MD       Current medications:    Current Facility-Administered Medications   Medication Dose Route Frequency Provider Last Rate Last Admin    sodium chloride flush 0.9 % injection 5-40 mL  5-40 mL IntraVENous 2 times per day Saira Jenkins MD        sodium chloride flush 0.9 % injection 5-40 mL  5-40 mL IntraVENous PRN Saira Jenkins MD        0.9 % sodium chloride infusion  25 mL IntraVENous PRN Saira Jenkins  mL/hr at 22 0908 25 mL at 22 0908       Allergies:  No Known Allergies    Problem List:    Patient Active Problem List   Diagnosis Code    Abscess of abdominal cavity (HCC) K65.1    Weight loss, unintentional R63.4    Lower abdominal pain R10.30    Moderate malnutrition (HCC) E44.0    Lung nodule R91.1    Severe malnutrition ([address] E43    Chronic anemia D64.9    Other neutropenia ([address] D70.8       Past Medical History:        Diagnosis Date    Cerebral artery occlusion with cerebral infarction ([address]     Chronic anemia 2021    Hypertension     Other neutropenia ([address] 2021       Past Surgical History:        Procedure Laterality Date    APPENDECTOMY      CHOLECYSTECTOMY      COLONOSCOPY      CT ASP ABS HEMATOMA BULLA CYST  2021    CT ASP ABS HEMATOMA BULLA CYST 2021 [facility] CT SCAN    FRACTURE SURGERY  HERNIA REPAIR      JOINT REPLACEMENT         Social History:    Social History     Tobacco Use    Smoking status: Never Smoker    Smokeless tobacco: Never Used   Substance Use Topics    Alcohol use: Never                                Counseling given: Not Answered      Vital Signs (Current):   Vitals:    02/16/22 0850   BP: 122/73   Pulse: 83   Resp: 16   Temp: 36.3 °C (97.3 °F)   TempSrc: Temporal   SpO2: 100%   Weight: 129 lb 3.2 oz (58.6 kg)   Height: 5' 8\" (1.727 m)                                              BP Readings from Last 3 Encounters:   02/16/22 122/73   01/19/22 (!) 146/91   01/12/22 118/68       NPO Status: Time of last liquid consumption: 0500 (sip of water)                        Time of last solid consumption: 1200                        Date of last liquid consumption: 02/16/22                        Date of last solid food consumption: 02/15/22    BMI:   Wt Readings from Last 3 Encounters:   02/16/22 129 lb 3.2 oz (58.6 kg)   01/19/22 132 lb 9.6 oz (60.1 kg)   01/12/22 133 lb 3.2 oz (60.4 kg)     Body mass index is 19.64 kg/m². CBC:   Lab Results   Component Value Date    WBC 3.4 12/08/2021    RBC 3.76 12/08/2021    HGB 9.8 12/08/2021    HCT 33.6 12/08/2021    MCV 89.4 12/08/2021     12/08/2021       CMP:   Lab Results   Component Value Date     12/06/2021    K 4.1 12/06/2021    K 4.0 11/30/2021     12/06/2021    CO2 28 12/06/2021    BUN 9 12/06/2021    CREATININE 0.6 12/06/2021    LABGLOM >90 12/06/2021    GLUCOSE 88 12/06/2021    PROT 6.7 12/05/2021    CALCIUM 9.0 12/06/2021    BILITOT 0.2 12/05/2021    ALKPHOS 72 12/05/2021    AST 18 12/05/2021    ALT 7 12/05/2021       POC Tests: No results for input(s): POCGLU, POCNA, POCK, POCCL, POCBUN, POCHEMO, POCHCT in the last 72 hours.     Coags: No results found for: PROTIME, INR, APTT    HCG (If Applicable): No results found for: PREGTESTUR, PREGSERUM, HCG, HCGQUANT     ABGs: No results found for: PHART, PO2ART, KDB3DHV, GHV5FJL, BEART, E4UUBLMV     Type & Screen (If Applicable):  No results found for: LABABO, LABRH    Drug/Infectious Status (If Applicable):  No results found for: HIV, HEPCAB    COVID-19 Screening (If Applicable):   Lab Results   Component Value Date    COVID19 NOT  DETECTED 11/29/2021           Anesthesia Evaluation  Patient summary reviewed and Nursing notes reviewed  Airway: Mallampati: II  TM distance: >3 FB   Neck ROM: full  Mouth opening: > = 3 FB Dental:          Pulmonary:                              Cardiovascular:    (+) hypertension:,       ECG reviewed      Echocardiogram reviewed                  Neuro/Psych:   (+) CVA: residual symptoms, neuromuscular disease:,              ROS comment: Tingling to left side intermittently GI/Hepatic/Renal:             Endo/Other:                     Abdominal:             Vascular: Other Findings:             Anesthesia Plan      MAC     ASA 3       Induction: intravenous. Anesthetic plan and risks discussed with patient. Use of blood products discussed with patient whom. Plan discussed with attending and CRNA.                   SOLO Shepherd - CRNA   2/16/2022

## 2022-02-16 NOTE — OP NOTE
[address] [place] [address]                                OPERATIVE REPORT    PATIENT NAME: Lisa Ferro                        :        1950  MED REC NO:   [id number]                           ROOM:  ACCOUNT NO:   [de-identified]                           ADMIT DATE: 2022  PROVIDER:     MAHENDRA Aldana [facility] OF PROCEDURE:  2022    INDICATIONS:  The patient was seen in the hospital with retroperitoneal  abscess. History of polyp in the past.  Plan today for colonoscopy to  evaluate. SURGEON:  Tung Shore MD    ASA CLASSIFICATION:  Please see Anesthesia note. ESTIMATED BLOOD LOSS:  None. DESCRIPTION OF PROCEDURE:  The patient was brought to the GI lab. Consent was obtained. Risks involved with the procedure were explained  to the patient. Informed consent was obtained. The patient was  monitored during the procedure with pulse oximetry, blood pressure  monitoring, oxygen by nasal cannula. Sedation by incremental doses of  IV propofol given by Anesthesia Service to achieve total IV anesthesia. For ASA classification and medication given during the procedure, please  see Anesthesia note. PROCEDURE PERFORMED:  Colonoscopy with biopsy. Digital examination revealed normal rectum. Standard colonoscope was  advanced under direct vision from the rectum up to the cecum. Prep was  good and the patient tolerated the procedure well. Cecum intubation was  confirmed by appendiceal orifice. Intubating the sigmoid was slightly  difficult. Pressure had to be applied to the abdomen to be able to  advance. Scope was withdrawn. Cecum intubation was confirmed by  appendiceal orifice. Scope was advanced to the terminal ileum which  appeared normal.  Scope was withdrawn. Due to history of diarrhea,  random biopsy in one jar to evaluate. In the ascending, seen one  diverticula has pus in it.   I mention that this patient has  retroperitoneal abscess. Biopsy was obtained in the same jar to  evaluate the area. Scope was withdrawn. Mild left side diverticulosis. No other diverticulosis in the right side other than one described with  pus coming out of it. Scope was withdrawn with no immediate  complications. IMPRESSION:  1. Very mild diverticulosis. 2.  Difficult intubation in sigmoid area. 3.  One diverticula seen in the ascending, white secretions coming out  of it. 4.  Due to history of diarrhea biopsy obtained. PLAN:  Follow up with biopsy result in the GI clinic for further  evaluation. More recommendations after reviewing the biopsy result.         Linda No M.D.    D: 02/16/2022 11:02:14       T: 02/16/2022 12:56:45     [email or web address]  Job#: [id number]     Doc#: [id number]    CC:  Janae Horton Md

## 2022-05-18 ENCOUNTER — TELEPHONE (OUTPATIENT)
Dept: CARDIOLOGY CLINIC | Age: 72
End: 2022-05-18

## 2022-05-18 NOTE — TELEPHONE ENCOUNTER
LM for patient to return call. Pt is [place], phone number listed is for voice mails only, pt will then return call. Referral received for pre-op clearance.

## 2022-05-23 ENCOUNTER — OFFICE VISIT (OUTPATIENT)
Dept: CARDIOLOGY CLINIC | Age: 72
End: 2022-05-23
Payer: COMMERCIAL

## 2022-05-23 VITALS
HEART RATE: 75 BPM | WEIGHT: 127 LBS | HEIGHT: 68 IN | SYSTOLIC BLOOD PRESSURE: 103 MMHG | BODY MASS INDEX: 19.25 KG/M2 | DIASTOLIC BLOOD PRESSURE: 76 MMHG

## 2022-05-23 DIAGNOSIS — Z01.818 PREOPERATIVE CLEARANCE: ICD-10-CM

## 2022-05-23 DIAGNOSIS — R06.02 SOB (SHORTNESS OF BREATH): Primary | ICD-10-CM

## 2022-05-23 PROBLEM — K85.10 PANCREATITIS, GALLSTONE: Status: ACTIVE | Noted: 2020-06-08

## 2022-05-23 PROCEDURE — 99204 OFFICE O/P NEW MOD 45 MIN: CPT | Performed by: INTERNAL MEDICINE

## 2022-05-23 PROCEDURE — 93000 ELECTROCARDIOGRAM COMPLETE: CPT | Performed by: INTERNAL MEDICINE

## 2022-05-23 NOTE — PROGRESS NOTES
[address] [facility] [place] [facility] [place] [address] [facility] [address]  Dept: [phone]  Dept Fax: [phone]  Loc: [phone]    Visit Date: 5/23/2022    Mr. Cerrato is a 66 y.o. male  who presented for:  Chief Complaint   Patient presents with    New Patient    Cardiac Clearance       HPI:   65 yo M c hx of CVA x 2, HTN is referred for preop risk stratification for colon resection. He had extensive history of perihepatic abscess/colon abcess. Patient has some tingling sensation since his CVA. He states he underwent a carotid duplex at [address] [place] [address], the results are not available to me. Denies any chest pain, sob, palpitations, lightheadedness, dizziness, orthopnea, PND or pedal edema. Reports he can walk with a roller walker for few blocks. EKG SR, no ST-T changes, echo 11/30/21 which showed normal LVSF 55-60%, no valve problems. Current Outpatient Medications:     omeprazole (PRILOSEC) 20 MG delayed release capsule, Take 1 capsule by mouth daily Please refill OTC, Disp: 30 capsule, Rfl: 3    acetaminophen (TYLENOL) 500 MG tablet, Take 500 mg by mouth every 6 hours as needed for Pain, Disp: , Rfl:     Past Medical History  Jes Keith  has a past medical history of Cerebral artery occlusion with cerebral infarction ([facility] Chronic anemia, Hypertension, and Other neutropenia ([facility] [address] Social History  Jes Keith  reports that he has never smoked. He has never used smokeless tobacco. He reports that he does not drink alcohol and does not use drugs. Family History  Jes Keith family history is not on file.     Past Surgical History   Past Surgical History:   Procedure Laterality Date    APPENDECTOMY      CHOLECYSTECTOMY      COLONOSCOPY      COLONOSCOPY N/A 2/16/2022    COLONOSCOPY WITH BIOPSY performed by Valorie May MD at [facility]    CT ASP ABS HEMATOMA BULLA CYST  11/30/2021    CT ASP ABS HEMATOMA BULLA CYST 11/30/2021 [facility] CT SCAN    FRACTURE SURGERY      HERNIA REPAIR      JOINT REPLACEMENT         Subjective:     REVIEW OF SYSTEMS  Constitutional: denies sweats, chills and fever  HENT: denies  congestion, sinus pressure, sneezing and sore throat. Eyes: denies  pain, discharge, redness and itching. Respiratory: denies apnea, cough  Gastrointestinal: denies blood in stool, constipation, diarrhea   Endocrine: denies cold intolerance, heat intolerance, polydipsia. Genitourinary: denies dysuria, enuresis, flank pain and hematuria. Musculoskeletal: denies arthralgias, joint swelling and neck pain. Neurological: denies numbness and headaches. Psychiatric/Behavioral: denies agitation, confusion, decreased concentration and dysphoric mood    All others reviewed and are negative. Objective:     /76   Pulse 75   Ht 5' 8\" (1.727 m)   Wt 127 lb (57.6 kg)   BMI 19.31 kg/m²     Wt Readings from Last 3 Encounters:   05/23/22 127 lb (57.6 kg)   03/08/22 127 lb (57.6 kg)   02/16/22 129 lb 3.2 oz (58.6 kg)     BP Readings from Last 3 Encounters:   05/23/22 103/76   03/08/22 (!) 147/84   02/16/22 106/63       PHYSICAL EXAM  Constitutional: Oriented to person, place, and time. Appears well-developed and well-nourished. HENT:   Head: Normocephalic and atraumatic. Eyes: EOM are normal. Pupils are equal, round, and reactive to light. Neck: Normal range of motion. Neck supple. No JVD present. Cardiovascular: Normal rate , normal heart sounds and intact distal pulses. Pulmonary/Chest: Effort normal and breath sounds normal. No respiratory distress. No wheezes. No rales. Abdominal: Soft. Bowel sounds are normal. No distension. There is no tenderness. Musculoskeletal: Normal range of motion. No edema. Neurological: Alert and oriented to person, place, and time. No cranial nerve deficit. Coordination normal.   Skin: Skin is warm and dry. Psychiatric: Normal mood and affect.        No results found for: CKTOTAL, CKMB, [facility]    Lab Results   Component Value Date    WBC 3.4 12/08/2021    RBC 3.76 12/08/2021    HGB 9.8 12/08/2021    HCT 33.6 12/08/2021    MCV 89.4 12/08/2021    MCH 26.1 12/08/2021    MCHC 29.2 12/08/2021     12/08/2021    MPV 8.4 12/08/2021       Lab Results   Component Value Date     12/06/2021    K 4.1 12/06/2021    K 4.0 11/30/2021     12/06/2021    CO2 28 12/06/2021    BUN 9 12/06/2021    LABALBU 3.2 12/05/2021    CREATININE 0.6 12/06/2021    CALCIUM 9.0 12/06/2021    LABGLOM >90 12/06/2021    GLUCOSE 88 12/06/2021       Lab Results   Component Value Date    ALKPHOS 72 12/05/2021    ALT 7 12/05/2021    AST 18 12/05/2021    PROT 6.7 12/05/2021    BILITOT 0.2 12/05/2021    BILIDIR <0.2 11/29/2021    LABALBU 3.2 12/05/2021       No results found for: MG    No results found for: INR, PROTIME      No results found for: LABA1C    No results found for: TRIG, HDL, LDLCALC, LDLDIRECT, LABVLDL    No results found for: TSH      Testing Reviewed:      I haveindividually reviewed the below cardiac tests    EKG:    ECHO: Results for orders placed during the hospital encounter of 11/29/21    ECHO Complete 2D W Doppler W Color    Narrative  Transthoracic Echocardiography Report (TTE)    Demographics    Patient Name    Diamond Pearson Gender                Male    MR #            [id number]   Race                      Ethnicity    Account #       [de-identified]   Room Number           0003    Accession       [id number]  Date of Study         11/30/2021  Number    Date of Birth   1950  Referring Physician   El Michel MD    Age             66 year(s)  Sonographer           Alanis Carvajal RDCS    Interpreting          Echo reader of the week  Physician             Mireya Gaston MD    Procedure    Type of Study    TTE procedure:ECHOCARDIOGRAM COMPLETE 2D W DOPPLER W COLOR.     Procedure Date  Date: 11/30/2021 Start: 03:20 PM    Study Location: Bedside  Technical Quality: Poor visualization due to lung interface. Indications:Rule out endocarditis. Additional Medical History:Hypertension. Patient Status: Routine    Height: 68 inches Weight: 125 pounds BSA: 1.67 m^2 BMI: 19.01 kg/m^2    BP: 113/66 mmHg    Conclusions    Summary  Ejection fraction is visually estimated at 55%. Overall left ventricular function is normal.    Signature    ----------------------------------------------------------------  Electronically signed by Jeison Mariscal MD (Interpreting  physician) on 11/30/2021 at 07:42 PM  ----------------------------------------------------------------    Findings    Mitral Valve  The mitral valve structure was normal with normal leaflet separation. DOPPLER: The transmitral velocity was within the normal range with no  evidence for mitral stenosis. There was no evidence of mitral  regurgitation. Aortic Valve  The aortic valve was trileaflet with normal thickness and cuspal  separation. DOPPLER: Transaortic velocity was within the normal range with  no evidence of aortic stenosis. There was no evidence of aortic  regurgitation. Tricuspid Valve  Trivial tricuspid regurgitation visualized. Pulmonic Valve  The pulmonic valve was not well visualized . Trivial pulmonic regurgitation visualized. Left Atrium  Left atrial size was normal.    Left Ventricle  Ejection fraction is visually estimated at 55%. Overall left ventricular function is normal.    Right Atrium  Right atrial size was normal.    Right Ventricle  The right ventricular size was normal with normal systolic function and  wall thickness. Pericardial Effusion  The pericardium was normal in appearance with no evidence of a pericardial  effusion. Pleural Effusion  No evidence of pleural effusion. Aorta / Great Vessels  -Aortic root dimension within normal limits.  -The Pulmonary artery is within normal limits. -IVC size is within normal limits with normal respiratory phasic changes.     M-Mode/2D Measurements & Calculations    LV Diastolic    LV Systolic Dimension: 3.1  AV Cusp Separation: 1.8 cmLA  Dimension: 4.5  cm                          Dimension: 4.5 cmAO Root  cm              LV Volume Diastolic: 87.2   Dimension: 3.6 cm  LV FS:31.1 %    ml  LV PW           LV Volume Systolic: 71.7 ml  Diastolic: 1 cm LV EDV/LV EDV Index: 92.4  Septum          ml/55 m^2LV ESV/LV ESV      RV Diastolic Dimension: 1.7 cm  Diastolic: 0.8  Index: 70.6 ml/23 m^2  cm              EF Calculated: 59 %         LA/Aorta: 1.25    Doppler Measurements & Calculations    MV Peak E-Wave: 64.3 cm/s  AV Peak Velocity:     LVOT Peak Velocity: 75.4  MV Peak A-Wave: 48 cm/s    95.3 cm/s             cm/s  MV E/A Ratio: 1.34         AV Peak Gradient:     LVOT Peak Gradient: 2  MV Peak Gradient: 1.65     3.63 mmHg             mmHg  mmHg  TV Peak E-Wave: 51.8 cm/s  MV Deceleration Time: 229                        TV Peak A-Wave: 47.1 cm/s  msec  MV P1/2t: 67 msec                                TV Peak Gradient: 1.07  MVA by PHT:3.28 cm^2                             mmHg  TR Velocity:246 cm/s  MV E' Septal Velocity: 6.9 AV DVI (Vmax):0.79    TR Gradient:24.21 mmHg  cm/s                                             PV Peak Velocity: 87.8  MV A' Septal Velocity: 8.3                       cm/s  cm/s                                             PV Peak Gradient: 3.08  MV E' Lateral Velocity:                          mmHg  6.7 cm/s  MV A' Lateral Velocity:  11.1 cm/s  E/E' septal: 9.32  E/E' lateral: 9.6    [id number]      STRESS:    CATH:    Assessment/Plan       Diagnosis Orders   1. SOB (shortness of breath)  EKG 12 lead   2.  Preoperative clearance  EKG 12 lead     Preop risk stratification for colon resection  Perihepatic abscess  CVA x 2  HTN    Denies any cardiac symptoms  Reviewed EKG and Echo   Need to first see the carotid duplex study report  Fair exercise tolerance >4 METs  May proceed with scheduled surgery if carotid duplex is ok at moderate risk  Need carotid duplex results first  The patient is asked to make an attempt to improve diet and exercise patterns to aid in medical management of this problem. Advised more plant based nutrition/meditarrean diet   Advised patient to call office or seek immediate medical attention if there is any new onset of  any chest pain, sob, palpitations, lightheadedness, dizziness, orthopnea, PND or pedal edema. All medication side effects were discussed in details. Thank youfor allowing me to participate in the care of this patient. Please do not hesitate to contact me for any further questions. Return in about 6 months (around 11/23/2022), or if symptoms worsen or fail to improve, for Review testing, Regular follow up.        Electronically signed by Edna Ham MD [facility]  5/23/2022 at 3:14 PM EDT

## 2022-05-23 NOTE — PROGRESS NOTES
New patient appt colon surgery with martha 6/1/22 with Dr. Bert [facility]     Pt denies chest pain, heart palpitations, dizziness    Pt continues with swelling in legs and feet     EKG done today

## 2022-11-04 ENCOUNTER — TELEPHONE (OUTPATIENT)
Dept: CARDIOLOGY CLINIC | Age: 72
End: 2022-11-04

## 2022-11-04 NOTE — LETTER
[address] [facility]  [place] [address] [place] [address]  Phone: [phone]  Fax: [phone]    Merly Mejia MD        November 11, 2022    Michelle Gonzalez  [address] [address]      Dear Zaire Khan: We have attempted to contact you by phone but have been unsuccessful. We were asking if you wanted to re-schdule your appointment with Dr Stephy Amaro. If you want to re-schedule please call us at [phone] option #3. If you have any questions or concerns, please don't hesitate to call.     Sincerely,        [facility]

## 2022-11-04 NOTE — TELEPHONE ENCOUNTER
ALEX was left from daughter Bushra Zapata at the  that pt wanted to cancel 11/7/22 appt with Dr. Solis Pérez. Does pt want to rs? Appt not cancelled yet    ALEX was left from a [phone]. Spoke with a gentleman at this number and he said he will not see pt until Monday.

## (undated) DEVICE — BIOGUARD A/W CLEANING ADAPTER

## (undated) DEVICE — GLOVE ORTHO 8   MSG9480